# Patient Record
Sex: MALE | Race: BLACK OR AFRICAN AMERICAN | NOT HISPANIC OR LATINO | URBAN - METROPOLITAN AREA
[De-identification: names, ages, dates, MRNs, and addresses within clinical notes are randomized per-mention and may not be internally consistent; named-entity substitution may affect disease eponyms.]

---

## 2018-05-18 ENCOUNTER — INPATIENT (INPATIENT)
Facility: HOSPITAL | Age: 68
LOS: 3 days | Discharge: HOME | End: 2018-05-22
Attending: HOSPITALIST | Admitting: HOSPITALIST
Payer: SELF-PAY

## 2018-05-18 VITALS
RESPIRATION RATE: 16 BRPM | DIASTOLIC BLOOD PRESSURE: 94 MMHG | TEMPERATURE: 98 F | HEART RATE: 86 BPM | OXYGEN SATURATION: 100 % | SYSTOLIC BLOOD PRESSURE: 161 MMHG

## 2018-05-18 NOTE — ED ADULT TRIAGE NOTE - CHIEF COMPLAINT QUOTE
pt c/o "right foot infection"- pts right foot is red & swollen, with blister and drainage between 4th&5th toe- pt on cephalexin since Tuesday from PCP with no improvement- pt denies fevers/chills, PMH HTN, ambulatory in triage

## 2018-05-19 DIAGNOSIS — L08.9 LOCAL INFECTION OF THE SKIN AND SUBCUTANEOUS TISSUE, UNSPECIFIED: ICD-10-CM

## 2018-05-19 LAB
ALBUMIN SERPL ELPH-MCNC: 4 G/DL — SIGNIFICANT CHANGE UP (ref 3.3–5)
ALP SERPL-CCNC: 86 U/L — SIGNIFICANT CHANGE UP (ref 40–120)
ALT FLD-CCNC: 11 U/L — SIGNIFICANT CHANGE UP (ref 4–41)
APTT BLD: 30.8 SEC — SIGNIFICANT CHANGE UP (ref 27.5–37.4)
AST SERPL-CCNC: 24 U/L — SIGNIFICANT CHANGE UP (ref 4–40)
BASE EXCESS BLDV CALC-SCNC: 1.7 MMOL/L — SIGNIFICANT CHANGE UP
BASOPHILS # BLD AUTO: 0.05 K/UL — SIGNIFICANT CHANGE UP (ref 0–0.2)
BASOPHILS NFR BLD AUTO: 0.6 % — SIGNIFICANT CHANGE UP (ref 0–2)
BILIRUB SERPL-MCNC: 0.5 MG/DL — SIGNIFICANT CHANGE UP (ref 0.2–1.2)
BLD GP AB SCN SERPL QL: NEGATIVE — SIGNIFICANT CHANGE UP
BLOOD GAS VENOUS - CREATININE: 1.52 MG/DL — HIGH (ref 0.5–1.3)
BUN SERPL-MCNC: 11 MG/DL — SIGNIFICANT CHANGE UP (ref 7–23)
BUN SERPL-MCNC: 12 MG/DL — SIGNIFICANT CHANGE UP (ref 7–23)
CALCIUM SERPL-MCNC: 8.9 MG/DL — SIGNIFICANT CHANGE UP (ref 8.4–10.5)
CALCIUM SERPL-MCNC: 9.1 MG/DL — SIGNIFICANT CHANGE UP (ref 8.4–10.5)
CHLORIDE BLDV-SCNC: 107 MMOL/L — SIGNIFICANT CHANGE UP (ref 96–108)
CHLORIDE SERPL-SCNC: 102 MMOL/L — SIGNIFICANT CHANGE UP (ref 98–107)
CHLORIDE SERPL-SCNC: 99 MMOL/L — SIGNIFICANT CHANGE UP (ref 98–107)
CO2 SERPL-SCNC: 23 MMOL/L — SIGNIFICANT CHANGE UP (ref 22–31)
CO2 SERPL-SCNC: 27 MMOL/L — SIGNIFICANT CHANGE UP (ref 22–31)
CREAT SERPL-MCNC: 1.61 MG/DL — HIGH (ref 0.5–1.3)
CREAT SERPL-MCNC: 1.64 MG/DL — HIGH (ref 0.5–1.3)
CRP SERPL-MCNC: 25.4 MG/L — HIGH
EOSINOPHIL # BLD AUTO: 0.13 K/UL — SIGNIFICANT CHANGE UP (ref 0–0.5)
EOSINOPHIL NFR BLD AUTO: 1.6 % — SIGNIFICANT CHANGE UP (ref 0–6)
ERYTHROCYTE [SEDIMENTATION RATE] IN BLOOD: 14 MM/HR — SIGNIFICANT CHANGE UP (ref 1–15)
GAS PNL BLDV: 133 MMOL/L — LOW (ref 136–146)
GLUCOSE BLDV-MCNC: 105 — HIGH (ref 70–99)
GLUCOSE SERPL-MCNC: 101 MG/DL — HIGH (ref 70–99)
GLUCOSE SERPL-MCNC: 105 MG/DL — HIGH (ref 70–99)
GRAM STN SPEC: SIGNIFICANT CHANGE UP
HBA1C BLD-MCNC: 5.8 % — HIGH (ref 4–5.6)
HCO3 BLDV-SCNC: 23 MMOL/L — SIGNIFICANT CHANGE UP (ref 20–27)
HCT VFR BLD CALC: 42.3 % — SIGNIFICANT CHANGE UP (ref 39–50)
HCT VFR BLDV CALC: 44.4 % — SIGNIFICANT CHANGE UP (ref 39–51)
HGB BLD-MCNC: 13.9 G/DL — SIGNIFICANT CHANGE UP (ref 13–17)
HGB BLDV-MCNC: 14.5 G/DL — SIGNIFICANT CHANGE UP (ref 13–17)
IMM GRANULOCYTES # BLD AUTO: 0.02 # — SIGNIFICANT CHANGE UP
IMM GRANULOCYTES NFR BLD AUTO: 0.3 % — SIGNIFICANT CHANGE UP (ref 0–1.5)
INR BLD: 0.99 — SIGNIFICANT CHANGE UP (ref 0.88–1.17)
LACTATE BLDV-MCNC: 1.6 MMOL/L — SIGNIFICANT CHANGE UP (ref 0.5–2)
LYMPHOCYTES # BLD AUTO: 2.32 K/UL — SIGNIFICANT CHANGE UP (ref 1–3.3)
LYMPHOCYTES # BLD AUTO: 29.2 % — SIGNIFICANT CHANGE UP (ref 13–44)
MCHC RBC-ENTMCNC: 29 PG — SIGNIFICANT CHANGE UP (ref 27–34)
MCHC RBC-ENTMCNC: 32.9 % — SIGNIFICANT CHANGE UP (ref 32–36)
MCV RBC AUTO: 88.1 FL — SIGNIFICANT CHANGE UP (ref 80–100)
MONOCYTES # BLD AUTO: 0.66 K/UL — SIGNIFICANT CHANGE UP (ref 0–0.9)
MONOCYTES NFR BLD AUTO: 8.3 % — SIGNIFICANT CHANGE UP (ref 2–14)
NEUTROPHILS # BLD AUTO: 4.76 K/UL — SIGNIFICANT CHANGE UP (ref 1.8–7.4)
NEUTROPHILS NFR BLD AUTO: 60 % — SIGNIFICANT CHANGE UP (ref 43–77)
NRBC # FLD: 0 — SIGNIFICANT CHANGE UP
PCO2 BLDV: 55 MMHG — HIGH (ref 41–51)
PH BLDV: 7.32 PH — SIGNIFICANT CHANGE UP (ref 7.32–7.43)
PLATELET # BLD AUTO: 258 K/UL — SIGNIFICANT CHANGE UP (ref 150–400)
PMV BLD: 9.4 FL — SIGNIFICANT CHANGE UP (ref 7–13)
PO2 BLDV: < 24 MMHG — LOW (ref 35–40)
POTASSIUM BLDV-SCNC: 4.1 MMOL/L — SIGNIFICANT CHANGE UP (ref 3.4–4.5)
POTASSIUM SERPL-MCNC: 4.3 MMOL/L — SIGNIFICANT CHANGE UP (ref 3.5–5.3)
POTASSIUM SERPL-MCNC: 4.4 MMOL/L — SIGNIFICANT CHANGE UP (ref 3.5–5.3)
POTASSIUM SERPL-SCNC: 4.3 MMOL/L — SIGNIFICANT CHANGE UP (ref 3.5–5.3)
POTASSIUM SERPL-SCNC: 4.4 MMOL/L — SIGNIFICANT CHANGE UP (ref 3.5–5.3)
PROT SERPL-MCNC: 8.5 G/DL — HIGH (ref 6–8.3)
PROTHROM AB SERPL-ACNC: 11 SEC — SIGNIFICANT CHANGE UP (ref 9.8–13.1)
RBC # BLD: 4.8 M/UL — SIGNIFICANT CHANGE UP (ref 4.2–5.8)
RBC # FLD: 12.7 % — SIGNIFICANT CHANGE UP (ref 10.3–14.5)
RH IG SCN BLD-IMP: POSITIVE — SIGNIFICANT CHANGE UP
RH IG SCN BLD-IMP: POSITIVE — SIGNIFICANT CHANGE UP
SAO2 % BLDV: 29.3 % — LOW (ref 60–85)
SODIUM SERPL-SCNC: 136 MMOL/L — SIGNIFICANT CHANGE UP (ref 135–145)
SODIUM SERPL-SCNC: 138 MMOL/L — SIGNIFICANT CHANGE UP (ref 135–145)
SPECIMEN SOURCE: SIGNIFICANT CHANGE UP
WBC # BLD: 7.94 K/UL — SIGNIFICANT CHANGE UP (ref 3.8–10.5)
WBC # FLD AUTO: 7.94 K/UL — SIGNIFICANT CHANGE UP (ref 3.8–10.5)

## 2018-05-19 PROCEDURE — 73630 X-RAY EXAM OF FOOT: CPT | Mod: 26,RT

## 2018-05-19 PROCEDURE — 93010 ELECTROCARDIOGRAM REPORT: CPT | Mod: 76

## 2018-05-19 PROCEDURE — 99223 1ST HOSP IP/OBS HIGH 75: CPT

## 2018-05-19 RX ORDER — ACETAMINOPHEN 500 MG
650 TABLET ORAL EVERY 6 HOURS
Qty: 0 | Refills: 0 | Status: DISCONTINUED | OUTPATIENT
Start: 2018-05-19 | End: 2018-05-22

## 2018-05-19 RX ORDER — METOPROLOL TARTRATE 50 MG
50 TABLET ORAL DAILY
Qty: 0 | Refills: 0 | Status: DISCONTINUED | OUTPATIENT
Start: 2018-05-19 | End: 2018-05-22

## 2018-05-19 RX ORDER — CEFEPIME 1 G/1
1000 INJECTION, POWDER, FOR SOLUTION INTRAMUSCULAR; INTRAVENOUS EVERY 12 HOURS
Qty: 0 | Refills: 0 | Status: DISCONTINUED | OUTPATIENT
Start: 2018-05-19 | End: 2018-05-22

## 2018-05-19 RX ORDER — VANCOMYCIN HCL 1 G
750 VIAL (EA) INTRAVENOUS EVERY 12 HOURS
Qty: 0 | Refills: 0 | Status: DISCONTINUED | OUTPATIENT
Start: 2018-05-19 | End: 2018-05-21

## 2018-05-19 RX ORDER — VANCOMYCIN HCL 1 G
1000 VIAL (EA) INTRAVENOUS ONCE
Qty: 0 | Refills: 0 | Status: COMPLETED | OUTPATIENT
Start: 2018-05-19 | End: 2018-05-19

## 2018-05-19 RX ORDER — SODIUM CHLORIDE 9 MG/ML
1000 INJECTION, SOLUTION INTRAVENOUS ONCE
Qty: 0 | Refills: 0 | Status: COMPLETED | OUTPATIENT
Start: 2018-05-19 | End: 2018-05-19

## 2018-05-19 RX ORDER — PIPERACILLIN AND TAZOBACTAM 4; .5 G/20ML; G/20ML
4.5 INJECTION, POWDER, LYOPHILIZED, FOR SOLUTION INTRAVENOUS EVERY 8 HOURS
Qty: 0 | Refills: 0 | Status: DISCONTINUED | OUTPATIENT
Start: 2018-05-19 | End: 2018-05-19

## 2018-05-19 RX ORDER — HYDRALAZINE HCL 50 MG
10 TABLET ORAL EVERY 8 HOURS
Qty: 0 | Refills: 0 | Status: DISCONTINUED | OUTPATIENT
Start: 2018-05-19 | End: 2018-05-19

## 2018-05-19 RX ORDER — ASPIRIN/CALCIUM CARB/MAGNESIUM 324 MG
81 TABLET ORAL DAILY
Qty: 0 | Refills: 0 | Status: DISCONTINUED | OUTPATIENT
Start: 2018-05-19 | End: 2018-05-22

## 2018-05-19 RX ORDER — ENOXAPARIN SODIUM 100 MG/ML
40 INJECTION SUBCUTANEOUS EVERY 24 HOURS
Qty: 0 | Refills: 0 | Status: DISCONTINUED | OUTPATIENT
Start: 2018-05-19 | End: 2018-05-22

## 2018-05-19 RX ORDER — MUPIROCIN 20 MG/G
1 OINTMENT TOPICAL ONCE
Qty: 0 | Refills: 0 | Status: COMPLETED | OUTPATIENT
Start: 2018-05-19 | End: 2018-05-22

## 2018-05-19 RX ORDER — HYDRALAZINE HCL 50 MG
10 TABLET ORAL EVERY 8 HOURS
Qty: 0 | Refills: 0 | Status: DISCONTINUED | OUTPATIENT
Start: 2018-05-19 | End: 2018-05-22

## 2018-05-19 RX ORDER — VANCOMYCIN HCL 1 G
1000 VIAL (EA) INTRAVENOUS EVERY 12 HOURS
Qty: 0 | Refills: 0 | Status: DISCONTINUED | OUTPATIENT
Start: 2018-05-19 | End: 2018-05-19

## 2018-05-19 RX ORDER — ACETAMINOPHEN 500 MG
975 TABLET ORAL ONCE
Qty: 0 | Refills: 0 | Status: COMPLETED | OUTPATIENT
Start: 2018-05-19 | End: 2018-05-19

## 2018-05-19 RX ORDER — MORPHINE SULFATE 50 MG/1
2 CAPSULE, EXTENDED RELEASE ORAL ONCE
Qty: 0 | Refills: 0 | Status: DISCONTINUED | OUTPATIENT
Start: 2018-05-19 | End: 2018-05-19

## 2018-05-19 RX ORDER — METOPROLOL TARTRATE 50 MG
50 TABLET ORAL DAILY
Qty: 0 | Refills: 0 | Status: DISCONTINUED | OUTPATIENT
Start: 2018-05-19 | End: 2018-05-19

## 2018-05-19 RX ORDER — OXYCODONE HYDROCHLORIDE 5 MG/1
5 TABLET ORAL ONCE
Qty: 0 | Refills: 0 | Status: DISCONTINUED | OUTPATIENT
Start: 2018-05-19 | End: 2018-05-19

## 2018-05-19 RX ORDER — ASPIRIN/CALCIUM CARB/MAGNESIUM 324 MG
81 TABLET ORAL DAILY
Qty: 0 | Refills: 0 | Status: DISCONTINUED | OUTPATIENT
Start: 2018-05-19 | End: 2018-05-19

## 2018-05-19 RX ORDER — SODIUM CHLORIDE 9 MG/ML
750 INJECTION INTRAMUSCULAR; INTRAVENOUS; SUBCUTANEOUS
Qty: 0 | Refills: 0 | Status: DISCONTINUED | OUTPATIENT
Start: 2018-05-19 | End: 2018-05-21

## 2018-05-19 RX ORDER — CEFEPIME 1 G/1
2000 INJECTION, POWDER, FOR SOLUTION INTRAMUSCULAR; INTRAVENOUS ONCE
Qty: 0 | Refills: 0 | Status: COMPLETED | OUTPATIENT
Start: 2018-05-19 | End: 2018-05-19

## 2018-05-19 RX ADMIN — Medication 250 MILLIGRAM(S): at 02:02

## 2018-05-19 RX ADMIN — Medication 975 MILLIGRAM(S): at 01:20

## 2018-05-19 RX ADMIN — MORPHINE SULFATE 2 MILLIGRAM(S): 50 CAPSULE, EXTENDED RELEASE ORAL at 13:25

## 2018-05-19 RX ADMIN — SODIUM CHLORIDE 250 MILLILITER(S): 9 INJECTION INTRAMUSCULAR; INTRAVENOUS; SUBCUTANEOUS at 11:19

## 2018-05-19 RX ADMIN — Medication 250 MILLIGRAM(S): at 16:34

## 2018-05-19 RX ADMIN — Medication 10 MILLIGRAM(S): at 14:20

## 2018-05-19 RX ADMIN — SODIUM CHLORIDE 1000 MILLILITER(S): 9 INJECTION, SOLUTION INTRAVENOUS at 03:38

## 2018-05-19 RX ADMIN — MORPHINE SULFATE 2 MILLIGRAM(S): 50 CAPSULE, EXTENDED RELEASE ORAL at 13:08

## 2018-05-19 RX ADMIN — CEFEPIME 100 MILLIGRAM(S): 1 INJECTION, POWDER, FOR SOLUTION INTRAMUSCULAR; INTRAVENOUS at 01:21

## 2018-05-19 RX ADMIN — Medication 650 MILLIGRAM(S): at 19:00

## 2018-05-19 RX ADMIN — Medication 10 MILLIGRAM(S): at 21:56

## 2018-05-19 RX ADMIN — Medication 650 MILLIGRAM(S): at 18:06

## 2018-05-19 RX ADMIN — OXYCODONE HYDROCHLORIDE 5 MILLIGRAM(S): 5 TABLET ORAL at 23:44

## 2018-05-19 RX ADMIN — Medication 975 MILLIGRAM(S): at 02:22

## 2018-05-19 RX ADMIN — ENOXAPARIN SODIUM 40 MILLIGRAM(S): 100 INJECTION SUBCUTANEOUS at 11:53

## 2018-05-19 RX ADMIN — Medication 81 MILLIGRAM(S): at 11:53

## 2018-05-19 RX ADMIN — Medication 50 MILLIGRAM(S): at 11:53

## 2018-05-19 RX ADMIN — SODIUM CHLORIDE 1000 MILLILITER(S): 9 INJECTION, SOLUTION INTRAVENOUS at 01:21

## 2018-05-19 RX ADMIN — CEFEPIME 100 MILLIGRAM(S): 1 INJECTION, POWDER, FOR SOLUTION INTRAMUSCULAR; INTRAVENOUS at 14:42

## 2018-05-19 NOTE — PROGRESS NOTE ADULT - SUBJECTIVE AND OBJECTIVE BOX
Patient is a 68y old  Male who presents with a chief complaint of R foot swelling and pain (19 May 2018 10:58)       INTERVAL HPI/OVERNIGHT EVENTS:  Patient seen and evaluated at bedside.  Pt is resting comfortable in NAD. Denies N/V/F/C.  Pain controlled.    Allergies    No Known Allergies    Intolerances        Vital Signs Last 24 Hrs  T(C): 36.8 (19 May 2018 14:03), Max: 36.9 (18 May 2018 22:13)  T(F): 98.2 (19 May 2018 14:03), Max: 98.4 (18 May 2018 22:13)  HR: 79 (19 May 2018 14:03) (74 - 86)  BP: 152/105 (19 May 2018 14:03) (140/94 - 178/103)  BP(mean): --  RR: 18 (19 May 2018 14:03) (16 - 18)  SpO2: 100% (19 May 2018 14:03) (100% - 100%)    LABS:                        13.9   7.94  )-----------( 258      ( 19 May 2018 01:00 )             42.3     05-19    136  |  99  |  11  ----------------------------<  105<H>  4.4   |  23  |  1.64<H>    Ca    9.1      19 May 2018 01:00    TPro  8.5<H>  /  Alb  4.0  /  TBili  0.5  /  DBili  x   /  AST  24  /  ALT  11  /  AlkPhos  86  05-19    PT/INR - ( 19 May 2018 01:00 )   PT: 11.0 SEC;   INR: 0.99          PTT - ( 19 May 2018 01:00 )  PTT:30.8 SEC    CAPILLARY BLOOD GLUCOSE          Lower Extremity Physical Exam:  Vascular: DP/PT 2/4, B/L, CFT <3 seconds B/L, Temperature gradient increased to RLE & WNL on LLE.   Neuro: Epicritic sensation intact to the level of foot, B/L.  Skin: erythema to dorsal lateral midfoot w/ diffuse edema dorsum foot, no odor no residual purulent drainage noted, no crepitus noted w/ palpation digits,

## 2018-05-19 NOTE — CONSULT NOTE ADULT - ASSESSMENT
69yo M w/ RF 4th interspace abscess  ·	Pt seen and evaluated  ·	Stab incision performed w/ #15 blade and purulence expressed  ·	Culture taken  ·	Alcohol prep and 18cc 1% lidocaine plain used for block of 4th & 5th rays, pt in severe pain w/ abscess exploration  ·	5cc purulence expressed on exploration I&D  ·	Incision and drainage was performed excisional to and including subcutaneous layer using #15 blade and suture kit  ·	Did not PTB to 5th digit, although did probe to fascial layer  ·	Erosion noted on XR at medial 5th proximal phalanx head, no gas or emergent findings  ·	Labs and vitals stable  ·	MRI ordered to further evaluate possible erosion 2/2 osteomyelitis  ·	Recommend Cefepime & Vanco  ·	Recommend admission to medicine for IV abx and MRI workup  ·	d/w attending 67yo M w/ RF 4th interspace abscess  ·	Pt seen and evaluated  ·	Stab incision performed w/ #15 blade and purulence expressed  ·	Culture taken  ·	Alcohol prep and 18cc 1% lidocaine plain used for block of 4th & 5th rays, pt in severe pain w/ abscess exploration  ·	5cc purulence expressed on exploration I&D  ·	Incision and drainage was performed excisional to and including subcutaneous layer using #15 blade and suture kit  ·	Did not PTB to 5th digit, although did probe to fascial layer  ·	Erosion noted on XR at medial 5th proximal phalanx head, no gas or emergent findings  ·	Labs and vitals stable  ·	MRI ordered to further evaluate possible erosion 2/2 osteomyelitis  ·	Recommend Cefepime & Vanco  ·	Recommend admission to medicine for IV abx and MRI workup  ·	Bacitracin dsd applied  ·	d/w attending

## 2018-05-19 NOTE — CONSULT NOTE ADULT - SUBJECTIVE AND OBJECTIVE BOX
Patient is a 68y old  Male who presents with a chief complaint of     HPI: 69 yo male with PMH of HTN and unspecified cardiac dysrhythmia that he is being considered for pacemaker for, presents to the ED for one week of worsening right foot pain and infection. Patient states he believes he cut his foot between his 4th and 5th toes one week ago, saw an outside podiatrist 5 days ago on Tuesday who performed xrays and prescribed Keflex BID but pt states symptoms have been worsening and noted since yesterday (Friday) his entire right foot is swollen and there is a bullae on the dorsal aspect of his right foot between his 4th and 5th toe that seems to be filled with pus. Pt denies h/o diabetes, steroid use, or immunocompromise. Denies fever, chills, n/v, diaphoresis, CP, palpitations, SOB, rash, bruising.      PAST MEDICAL & SURGICAL HISTORY:  HTN (hypertension)  No significant past surgical history      MEDICATIONS  (STANDING):    MEDICATIONS  (PRN):      Allergies    No Known Allergies    Intolerances        VITALS:    Vital Signs Last 24 Hrs  T(C): 36.9 (18 May 2018 22:13), Max: 36.9 (18 May 2018 22:13)  T(F): 98.4 (18 May 2018 22:13), Max: 98.4 (18 May 2018 22:13)  HR: 78 (19 May 2018 01:28) (78 - 86)  BP: 161/120 (19 May 2018 01:28) (161/94 - 161/120)  BP(mean): --  RR: 18 (19 May 2018 01:28) (16 - 18)  SpO2: 100% (19 May 2018 01:28) (100% - 100%)    LABS:                          13.9   7.94  )-----------( 258      ( 19 May 2018 01:00 )             42.3       05-19    136  |  99  |  11  ----------------------------<  105<H>  4.4   |  23  |  1.64<H>    Ca    9.1      19 May 2018 01:00    TPro  8.5<H>  /  Alb  4.0  /  TBili  0.5  /  DBili  x   /  AST  24  /  ALT  11  /  AlkPhos  86  05-19      CAPILLARY BLOOD GLUCOSE          PT/INR - ( 19 May 2018 01:00 )   PT: 11.0 SEC;   INR: 0.99          PTT - ( 19 May 2018 01:00 )  PTT:30.8 SEC    LOWER EXTREMITY PHYSICAL EXAM:    Vascular: DP/PT 2/4, B/L, CFT <3 seconds B/L, Temperature gradient increased to RLE & WNL on LLE.   Neuro: Epicritic sensation intact to the level of foot, B/L.  Skin: erythema to dorsal lateral midfoot w/ diffuse edema dorsum foot, malodor on stab incision of abscess, interdigital abscess 4th interspace, no crepitus noted w/ palpation digits, purulence expressed (none from digits all from interspace)      RADIOLOGY & ADDITIONAL STUDIES:    < from: Xray Foot AP + Lateral + Oblique, Right (05.19.18 @ 01:19) >    ******PRELIMINARY REPORT******    ******PRELIMINARY REPORT******            EXAM:  RAD FOOT MIN 3 VIEWS RIGHT        PROCEDURE DATE:  May 19 2018     ******PRELIMINARY REPORT******    ******PRELIMINARY REPORT******            INTERPRETATION:  No gas.            ******PRELIMINARY REPORT******    ******PRELIMINARY REPORT******          DEION PALACIOS M.D., RADIOLOGY RESIDENT                        < end of copied text >

## 2018-05-19 NOTE — PROGRESS NOTE ADULT - ASSESSMENT
69yo M w/ RF 4th interspace abscess  ·	Pt seen and evaluated  ·	13 cc of 1% lidocaine plaine administered in a ray block  ·	All nonviable tissue debrided and wound explored using a  #15 blade, difficult to fully assess due to visualization, no drainage expressed today  ·	Did not PTB to 5th digit, although did probe to fascial layer  ·	Erosion noted on XR at medial 5th proximal phalanx head, no gas or emergent findings  ·	Labs and vitals stable  ·	Will f/u MRI further evaluate for abscess and possible osteomyelitis  ·	Cont  IV abx  ·	Dilute betadine dsd applied  ·	Mupirocin ordered for future dressing changes  ·	Seen with attending

## 2018-05-19 NOTE — ED PROVIDER NOTE - ATTENDING CONTRIBUTION TO CARE
69 y/o M with h/o HTN BIB daughter for infection to right foot.  Pt reports "a cut" to his right foot, between the 4th and 5th toes about 1 week ago.  Since then he developed swelling and a little "pustule" according to daughter.  Pt was seen by a private podiatrist on Tuesday and started on keflex.  Pt has been compliant with meds, but has had worsening in swelling and redness spreading up the foot laterally toward the ankle.  No fever, chills, n/v, other rashes.  Well appearing, lying comfortably in stretcher, awake and alert, nontoxic.  VSS.  Lungs cta bl.  Cards nl S1/S2, RRR, no MRG.  Abd soft ntnd.  No pedal edema or calf tenderness.  (+)pustule between 4th and 5th right toes at dorsal surface, fluctuant with diffuse surrounding redness, warmth and edema.  Cap refill normal, DP pulse intact.  Pt with cellulitis, failed outpatient rx, will start iv abx, labs, xr to r/o underlying gas or bony changes/fx, podiatry consult.

## 2018-05-19 NOTE — ED PROVIDER NOTE - PHYSICAL EXAMINATION
Ortho: right  to palpation throughout, most prominent on the dorsal and lateral aspects of the right foot, there is a bullae over the dorsal aspect of the right foot between the 4th and 5th toe with purulence between the 4th and 5th toes.

## 2018-05-19 NOTE — ED PROVIDER NOTE - OBJECTIVE STATEMENT
69 yo male with PMH of HTN and unspecified cardiac dysrhythmia that he is being considered for pacemaker for, presents to the ED for one week of worsening right foot pain and infection. Patient states he believes he cut his foot between his 4th and 5th toes one week ago, saw an outside podiatrist 5 days ago on Tuesday who performed xrays and prescribed Keflex BID but pt states symptoms have been worsening and noted since yesterday (Friday) his entire right foot is swollen and there is a bullae on the dorsal aspect of his right foot between his 4th and 5th toe that seems to be filled with pus. Pt denies h/o diabetes, steroid use, or immunocompromise. Denies fever, chills, n/v, diaphoresis, CP, palpitations, SOB, rash, bruising.

## 2018-05-19 NOTE — H&P ADULT - ASSESSMENT
Patient is a 67 y/o man with hypertension, bradycardia who presents with R foot pain x 1 week.    *R foot swelling, pain: abscess drained by podiatry. Likely has surrounding soft tissue infection. R foot x-ray with possible R 5th toe distal phalanx osteomyelitis  - MRI foot noncontrast as recommended by podiatry  - need for operative management as per podiatry  - broad spectrum abx - cefepime, vancomycin, renally adjusted dosing  - f/u abscess culture  - tylenol PRN pain  - bowel regimen    *HTN: BP elevated in ED and on floor  - at home on lisinopril 5 mg, Lasix 20 mg, and Toprol XL 50 mg  - resume Toprol XL  - Cr 1.64 - patient denies knowledge of chronic kidney disease  - home lisinopril, Lasix until baseline renal function verified  - patient had been prescribed hydralazine in December of 2017 per pharmacy - [phone]  - will start hydralazine 10 mg q8h for BP control while verifying baseline kidney function  - monitor BP closely    *Elevated Cr: possible LIZBETH, possible stage 3 CKD  - attempt to obtain baseline Cr from PCP  - appears dry on exam, give  CC over 3 hours  - repeat Cr this evening  - renally dose all medications    *H/o bradycardia: HR currently normal. Per patient he had HR in 30's in the past, refused PPM as "I don't want surgery at my age"  - given elevated BP and now normal HR, resume home Toprol XL 50 mg daily  - EKG  - monitor HR closely    *FEN/GI: low Na diet    *Ppx: SC lovenox    *Dispo: pending w/u as above and medical stability  - OOBTC Patient is a 69 y/o man with hypertension, bradycardia who presents with R foot pain x 1 week.    *R foot swelling, pain: abscess drained by podiatry. Likely has surrounding soft tissue infection. R foot x-ray with possible R 5th toe distal phalanx osteomyelitis  - MRI foot noncontrast as recommended by podiatry  - need for operative management as per podiatry  - broad spectrum abx - cefepime, vancomycin, renally adjusted dosing  - f/u abscess culture  - tylenol PRN pain  - bowel regimen    *HTN: BP elevated in ED and on floor  - at home on lisinopril 5 mg, Lasix 20 mg, and Toprol XL 50 mg  - resume Toprol XL  - Cr 1.64 - patient denies knowledge of chronic kidney disease  - home lisinopril, Lasix until baseline renal function verified  - patient had been prescribed hydralazine in December of 2017 per pharmacy - [phone]  - will start hydralazine 10 mg q8h for BP control while verifying baseline kidney function  - monitor BP closely    *Elevated Cr: possible LIZBETH, possible stage 3 CKD  - attempt to obtain baseline Cr from PCP  - appears dry on exam, give  CC over 3 hours  - repeat Cr this evening  - renally dose all medications    *H/o bradycardia: HR currently normal. Per patient he had HR in 30's in the past, refused PPM as "I don't want surgery at my age"  - given elevated BP and now normal HR, resume home Toprol XL 50 mg daily  - EKG  - monitor HR closely    *Pre-diabetes: patient aware blood glucose elevated, recommend lifestyle change    *FEN/GI: low Na diet    *Ppx: SC lovenox    *Dispo: pending w/u as above and medical stability  - OOBTC

## 2018-05-19 NOTE — ED ADULT NURSE NOTE - OBJECTIVE STATEMENT
Patient A&Ox4 ambulatory coming to ED for R foot infection. No fevers/ chills. R foot is swollen, hot, and red with 3x3cm pus filled sac below 4th and 5th toe. +pulses to bilateral feet/ ankles. Patient denies trauma to the area. IV placed, no distress at this time.

## 2018-05-19 NOTE — H&P ADULT - HISTORY OF PRESENT ILLNESS
HPI:    Patient is a 67 y/o man with hypertension, bradycardia who presents with R foot pain x 1 week. Per patient, about 1 year ago, he had presented to an outside hospital with dyspnea and bradycardia. A pacemaker was recommended at that time, but he did not want surgical intervention. Since per patient he has been doing well, denies chest pain, SOB.    About one week ago he developed a cut in his R foot - between 4th and 5th toes. Patient does not know how it occurred. Since then, his foot has been swollen and in pain. 3 days PTA he was evaluated by an outside podiatrist, who ordered x-ray and prescribed Keflex. Patient did not improve, and then developed a purulent bulla on his R foot. Decided to come to ED. Beyond his foot, patient reports feeling well overall. Denies F/C. Denies N/V, abdominal pain. Reports that he takes his medications - verified with pharmacy at [phone]. Denies known history of heart disease outside of bradycardia, and denies h/o known kidney disease.      PAST MEDICAL & SURGICAL HISTORY:  HTN (hypertension)  No significant past surgical history      Review of Systems:   CONSTITUTIONAL: No fever, weight loss, or fatigue  EYES: No eye pain, visual disturbances, or discharge  NECK: No pain or stiffness  RESPIRATORY: No cough, wheezing, chills or hemoptysis; No shortness of breath  CARDIOVASCULAR: No chest pain, palpitations, dizziness, or leg swelling  GASTROINTESTINAL: No abdominal or epigastric pain. No nausea, vomiting, or hematemesis; No diarrhea or constipation. No melena or hematochezia.  GENITOURINARY: No dysuria, frequency, hematuria, or incontinence  NEUROLOGICAL: No headaches, memory loss, loss of strength, numbness, or tremors  SKIN: No itching, burning, rashes, or lesions   LYMPH NODES: No enlarged glands  MUSCULOSKELETAL: R foot swelling and pain  HEME/LYMPH: No easy bruising, or bleeding gums    Allergies    No Known Allergies    Intolerances        Social History:  former smoker, drinks "root liquor" a few times a month    FAMILY HISTORY:  No pertinent family history in first degree relatives      MEDICATIONS  (STANDING):  aspirin  chewable 81 milliGRAM(s) Oral daily  cefepime   IVPB 1000 milliGRAM(s) IV Intermittent every 12 hours  enoxaparin Injectable 40 milliGRAM(s) SubCutaneous every 24 hours  hydrALAZINE 10 milliGRAM(s) Oral every 8 hours  metoprolol succinate ER 50 milliGRAM(s) Oral daily  mupirocin 2% Ointment 1 Application(s) Topical once  sodium chloride 0.9%. 750 milliLiter(s) (250 mL/Hr) IV Continuous <Continuous>  vancomycin  IVPB 1000 milliGRAM(s) IV Intermittent every 12 hours    MEDICATIONS  (PRN):      T(C): 36.3 (05-19-18 @ 05:00), Max: 36.9 (05-18-18 @ 22:13)  HR: 74 (05-19-18 @ 05:00) (74 - 86)  BP: 178/103 (05-19-18 @ 05:00) (161/94 - 178/103)  RR: 18 (05-19-18 @ 05:00) (16 - 18)  SpO2: 100% (05-19-18 @ 05:00) (100% - 100%)    CAPILLARY BLOOD GLUCOSE        I&O's Summary      PHYSICAL EXAM:  GENERAL: middle-aged man sitting up in bed, comfortable  HEAD:  NCAT  EYES: EOMI, PERRLA  NECK: Supple, No JVD  CHEST/LUNG: CTAB  HEART: nl S1/S2, no M/R/G  ABDOMEN: nondistended, soft, nontender  EXTREMITIES:  R foot in dressings  SKIN: No rashes or lesions  NEUROLOGY: A&Ox3, no focal deficits      LABS:                        13.9   7.94  )-----------( 258      ( 19 May 2018 01:00 )             42.3     05-19    136  |  99  |  11  ----------------------------<  105<H>  4.4   |  23  |  1.64<H>    Ca    9.1      19 May 2018 01:00    TPro  8.5<H>  /  Alb  4.0  /  TBili  0.5  /  DBili  x   /  AST  24  /  ALT  11  /  AlkPhos  86  05-19    PT/INR - ( 19 May 2018 01:00 )   PT: 11.0 SEC;   INR: 0.99          PTT - ( 19 May 2018 01:00 )  PTT:30.8 SEC          RADIOLOGY & ADDITIONAL TESTS:    EKG:   Other radiology  < from: Xray Foot AP + Lateral + Oblique, Right (05.19.18 @ 01:19) >    IMPRESSION:  Possible osteomyelitis distal proximal phalanx fifth toe.          < end of copied text >      Care Discussed with Consultants/Other Providers:

## 2018-05-20 LAB
BASOPHILS # BLD AUTO: 0.05 K/UL — SIGNIFICANT CHANGE UP (ref 0–0.2)
BASOPHILS NFR BLD AUTO: 0.9 % — SIGNIFICANT CHANGE UP (ref 0–2)
BUN SERPL-MCNC: 15 MG/DL — SIGNIFICANT CHANGE UP (ref 7–23)
CALCIUM SERPL-MCNC: 8.7 MG/DL — SIGNIFICANT CHANGE UP (ref 8.4–10.5)
CHLORIDE SERPL-SCNC: 100 MMOL/L — SIGNIFICANT CHANGE UP (ref 98–107)
CK MB BLD-MCNC: 3.52 NG/ML — SIGNIFICANT CHANGE UP (ref 1–6.6)
CK SERPL-CCNC: 135 U/L — SIGNIFICANT CHANGE UP (ref 30–200)
CO2 SERPL-SCNC: 22 MMOL/L — SIGNIFICANT CHANGE UP (ref 22–31)
CREAT SERPL-MCNC: 1.68 MG/DL — HIGH (ref 0.5–1.3)
EOSINOPHIL # BLD AUTO: 0.14 K/UL — SIGNIFICANT CHANGE UP (ref 0–0.5)
EOSINOPHIL NFR BLD AUTO: 2.6 % — SIGNIFICANT CHANGE UP (ref 0–6)
GLUCOSE SERPL-MCNC: 82 MG/DL — SIGNIFICANT CHANGE UP (ref 70–99)
HCT VFR BLD CALC: 36.2 % — LOW (ref 39–50)
HGB BLD-MCNC: 12.3 G/DL — LOW (ref 13–17)
IMM GRANULOCYTES # BLD AUTO: 0.01 # — SIGNIFICANT CHANGE UP
IMM GRANULOCYTES NFR BLD AUTO: 0.2 % — SIGNIFICANT CHANGE UP (ref 0–1.5)
LYMPHOCYTES # BLD AUTO: 1.99 K/UL — SIGNIFICANT CHANGE UP (ref 1–3.3)
LYMPHOCYTES # BLD AUTO: 37.1 % — SIGNIFICANT CHANGE UP (ref 13–44)
MAGNESIUM SERPL-MCNC: 2 MG/DL — SIGNIFICANT CHANGE UP (ref 1.6–2.6)
MCHC RBC-ENTMCNC: 29.5 PG — SIGNIFICANT CHANGE UP (ref 27–34)
MCHC RBC-ENTMCNC: 34 % — SIGNIFICANT CHANGE UP (ref 32–36)
MCV RBC AUTO: 86.8 FL — SIGNIFICANT CHANGE UP (ref 80–100)
MONOCYTES # BLD AUTO: 0.54 K/UL — SIGNIFICANT CHANGE UP (ref 0–0.9)
MONOCYTES NFR BLD AUTO: 10.1 % — SIGNIFICANT CHANGE UP (ref 2–14)
NEUTROPHILS # BLD AUTO: 2.64 K/UL — SIGNIFICANT CHANGE UP (ref 1.8–7.4)
NEUTROPHILS NFR BLD AUTO: 49.1 % — SIGNIFICANT CHANGE UP (ref 43–77)
NRBC # FLD: 0 — SIGNIFICANT CHANGE UP
PHOSPHATE SERPL-MCNC: 3.2 MG/DL — SIGNIFICANT CHANGE UP (ref 2.5–4.5)
PLATELET # BLD AUTO: 227 K/UL — SIGNIFICANT CHANGE UP (ref 150–400)
PMV BLD: 9 FL — SIGNIFICANT CHANGE UP (ref 7–13)
POTASSIUM SERPL-MCNC: 4.3 MMOL/L — SIGNIFICANT CHANGE UP (ref 3.5–5.3)
POTASSIUM SERPL-SCNC: 4.3 MMOL/L — SIGNIFICANT CHANGE UP (ref 3.5–5.3)
RBC # BLD: 4.17 M/UL — LOW (ref 4.2–5.8)
RBC # FLD: 12.7 % — SIGNIFICANT CHANGE UP (ref 10.3–14.5)
SODIUM SERPL-SCNC: 134 MMOL/L — LOW (ref 135–145)
SPECIMEN SOURCE: SIGNIFICANT CHANGE UP
TROPONIN T SERPL-MCNC: < 0.06 NG/ML — SIGNIFICANT CHANGE UP (ref 0–0.06)
WBC # BLD: 5.37 K/UL — SIGNIFICANT CHANGE UP (ref 3.8–10.5)
WBC # FLD AUTO: 5.37 K/UL — SIGNIFICANT CHANGE UP (ref 3.8–10.5)

## 2018-05-20 PROCEDURE — 99233 SBSQ HOSP IP/OBS HIGH 50: CPT

## 2018-05-20 RX ORDER — MORPHINE SULFATE 50 MG/1
2 CAPSULE, EXTENDED RELEASE ORAL ONCE
Qty: 0 | Refills: 0 | Status: DISCONTINUED | OUTPATIENT
Start: 2018-05-20 | End: 2018-05-20

## 2018-05-20 RX ADMIN — Medication 650 MILLIGRAM(S): at 23:58

## 2018-05-20 RX ADMIN — Medication 10 MILLIGRAM(S): at 06:54

## 2018-05-20 RX ADMIN — CEFEPIME 100 MILLIGRAM(S): 1 INJECTION, POWDER, FOR SOLUTION INTRAMUSCULAR; INTRAVENOUS at 01:29

## 2018-05-20 RX ADMIN — Medication 50 MILLIGRAM(S): at 06:54

## 2018-05-20 RX ADMIN — CEFEPIME 100 MILLIGRAM(S): 1 INJECTION, POWDER, FOR SOLUTION INTRAMUSCULAR; INTRAVENOUS at 13:18

## 2018-05-20 RX ADMIN — Medication 650 MILLIGRAM(S): at 07:05

## 2018-05-20 RX ADMIN — ENOXAPARIN SODIUM 40 MILLIGRAM(S): 100 INJECTION SUBCUTANEOUS at 11:04

## 2018-05-20 RX ADMIN — MORPHINE SULFATE 2 MILLIGRAM(S): 50 CAPSULE, EXTENDED RELEASE ORAL at 10:29

## 2018-05-20 RX ADMIN — Medication 650 MILLIGRAM(S): at 22:59

## 2018-05-20 RX ADMIN — Medication 81 MILLIGRAM(S): at 11:04

## 2018-05-20 RX ADMIN — Medication 250 MILLIGRAM(S): at 14:59

## 2018-05-20 RX ADMIN — Medication 10 MILLIGRAM(S): at 22:59

## 2018-05-20 RX ADMIN — Medication 250 MILLIGRAM(S): at 02:10

## 2018-05-20 RX ADMIN — Medication 650 MILLIGRAM(S): at 07:45

## 2018-05-20 RX ADMIN — Medication 10 MILLIGRAM(S): at 13:17

## 2018-05-20 RX ADMIN — OXYCODONE HYDROCHLORIDE 5 MILLIGRAM(S): 5 TABLET ORAL at 00:45

## 2018-05-20 RX ADMIN — MORPHINE SULFATE 2 MILLIGRAM(S): 50 CAPSULE, EXTENDED RELEASE ORAL at 10:44

## 2018-05-20 NOTE — PROGRESS NOTE ADULT - ASSESSMENT
69yo M w/ RF 4th interspace abscess  ·	Pt seen and evaluated  ·	Skin lines returning, edema & erythema improved  ·	Pending MRI for final plan, likely no surgery unless OM or further abscess noted  ·	Labs and vitals stable  ·	Cont  IV abx  ·	Bacitracin dsd applied, mupirocin reordered  ·	d/w attending

## 2018-05-20 NOTE — CHART NOTE - NSCHARTNOTEFT_GEN_A_CORE
ADS NIGHT COVERAGE    Notified by RN that pt is having chest pain. Pt seen and examined at bedside. Pt complains of 3/10 pain localized to the left side of chest that started a few hours ago, nonradiating, mostly with movement. Pt denies SOB, cough, abd pain. On cardiac exam S1S2 noted, not tachycardic. VSS.    ICU Vital Signs Last 24 Hrs  T(C): 36.9 (19 May 2018 21:07), Max: 36.9 (19 May 2018 21:07)  T(F): 98.5 (19 May 2018 21:07), Max: 98.5 (19 May 2018 21:07)  HR: 70 (19 May 2018 23:59) (70 - 85)  BP: 143/94 (19 May 2018 23:59) (117/76 - 178/103)  BP(mean): --  ABP: --  ABP(mean): --  RR: 18 (19 May 2018 23:59) (16 - 18)  SpO2: 100% (19 May 2018 23:59) (98% - 100%)    A/P: 69 y/o man with hypertension, bradycardia who presents with R foot pain x 1 week. Now with chest pain.  1) Chest pain  -EKG ordered-no acute changes  -Cardiac enzymes ordered STAT  -Continue to monitor pt  -Oxy IR given 20 mins ago for foot    E. Candido EDWARDS  [id number]

## 2018-05-20 NOTE — PROGRESS NOTE ADULT - SUBJECTIVE AND OBJECTIVE BOX
Patient is a 68y old  Male who presents with a chief complaint of R foot swelling and pain (19 May 2018 10:58)       INTERVAL HPI/OVERNIGHT EVENTS:  Patient seen and evaluated at bedside.  Pt is resting comfortable in NAD. Denies N/V/F/C.  Pain slightly improved today  Allergies    No Known Allergies    Intolerances        Vital Signs Last 24 Hrs  T(C): 36.9 (20 May 2018 05:07), Max: 36.9 (19 May 2018 21:07)  T(F): 98.4 (20 May 2018 05:07), Max: 98.5 (19 May 2018 21:07)  HR: 72 (20 May 2018 05:07) (70 - 85)  BP: 119/76 (20 May 2018 05:07) (109/68 - 152/105)  BP(mean): --  RR: 18 (20 May 2018 05:07) (16 - 18)  SpO2: 100% (20 May 2018 05:07) (98% - 100%)    LABS:                        12.3   5.37  )-----------( 227      ( 20 May 2018 06:35 )             36.2     05-20    134<L>  |  100  |  15  ----------------------------<  82  4.3   |  22  |  1.68<H>    Ca    8.7      20 May 2018 06:35  Phos  3.2     05-20  Mg     2.0     05-20    TPro  8.5<H>  /  Alb  4.0  /  TBili  0.5  /  DBili  x   /  AST  24  /  ALT  11  /  AlkPhos  86  05-19    PT/INR - ( 19 May 2018 01:00 )   PT: 11.0 SEC;   INR: 0.99          PTT - ( 19 May 2018 01:00 )  PTT:30.8 SEC    CAPILLARY BLOOD GLUCOSE          Lower Extremity Physical Exam:  Vascular: DP/PT 2/4, B/L, CFT <3 seconds B/L, Temperature gradient increased to RLE & WNL on LLE.   Neuro: Epicritic sensation intact to the level of foot, B/L.  Skin: erythema to dorsal lateral midfoot w/ diffuse edema dorsum foot, no odor no residual purulent drainage noted, no crepitus noted w/ palpation digits,    RADIOLOGY & ADDITIONAL TESTS:

## 2018-05-20 NOTE — PROGRESS NOTE ADULT - SUBJECTIVE AND OBJECTIVE BOX
Patient is a 68y old  Male who presents with a chief complaint of R foot swelling and pain (19 May 2018 10:58)      SUBJECTIVE / OVERNIGHT EVENTS:    MEDICATIONS  (STANDING):  aspirin  chewable 81 milliGRAM(s) Oral daily  cefepime   IVPB 1000 milliGRAM(s) IV Intermittent every 12 hours  enoxaparin Injectable 40 milliGRAM(s) SubCutaneous every 24 hours  hydrALAZINE 10 milliGRAM(s) Oral every 8 hours  metoprolol succinate ER 50 milliGRAM(s) Oral daily  mupirocin 2% Ointment 1 Application(s) Topical once  sodium chloride 0.9%. 750 milliLiter(s) (250 mL/Hr) IV Continuous <Continuous>  vancomycin  IVPB 750 milliGRAM(s) IV Intermittent every 12 hours    MEDICATIONS  (PRN):  acetaminophen   Tablet. 650 milliGRAM(s) Oral every 6 hours PRN Mild and Moderate pain and headache        PHYSICAL EXAM:  Vital Signs Last 24 Hrs  T(C): 36.9 (20 May 2018 05:07), Max: 36.9 (19 May 2018 21:07)  T(F): 98.4 (20 May 2018 05:07), Max: 98.5 (19 May 2018 21:07)  HR: 72 (20 May 2018 05:07) (70 - 85)  BP: 119/76 (20 May 2018 05:07) (109/68 - 152/105)  BP(mean): --  RR: 18 (20 May 2018 05:07) (16 - 18)  SpO2: 100% (20 May 2018 05:07) (98% - 100%)  GENERAL: NAD, well-developed  HEAD:  Atraumatic, Normocephalic  EYES: EOMI, PERRLA, conjunctiva and sclera clear  NECK: Supple, No JVD  CHEST/LUNG: Clear to auscultation bilaterally; No wheeze  HEART: Regular rate and rhythm; No murmurs, rubs, or gallops  ABDOMEN: Soft, Nontender, Nondistended; Bowel sounds present  EXTREMITIES:  2+ Peripheral Pulses, No clubbing, cyanosis, or edema  PSYCH: AAOx3  NEUROLOGY: non-focal  SKIN: No rashes or lesions    LABS:                        12.3   5.37  )-----------( 227      ( 20 May 2018 06:35 )             36.2     05-20    134<L>  |  100  |  15  ----------------------------<  82  4.3   |  22  |  1.68<H>    Ca    8.7      20 May 2018 06:35  Phos  3.2     05-20  Mg     2.0     05-20    TPro  8.5<H>  /  Alb  4.0  /  TBili  0.5  /  DBili  x   /  AST  24  /  ALT  11  /  AlkPhos  86  05-19    PT/INR - ( 19 May 2018 01:00 )   PT: 11.0 SEC;   INR: 0.99          PTT - ( 19 May 2018 01:00 )  PTT:30.8 SEC  CARDIAC MARKERS ( 20 May 2018 01:15 )  x     / < 0.06 ng/mL / 135 u/L / 3.52 ng/mL / x              RADIOLOGY & ADDITIONAL TESTS:    Imaging Personally Reviewed:    Consultant(s) Notes Reviewed:      Care Discussed with Consultants/Other Providers: Patient is a 68y old  Male who presents with a chief complaint of R foot swelling and pain (19 May 2018 10:58)      SUBJECTIVE / OVERNIGHT EVENTS:  Foot pain now improved with pain meds.  Awaiting MRI    MEDICATIONS  (STANDING):  aspirin  chewable 81 milliGRAM(s) Oral daily  cefepime   IVPB 1000 milliGRAM(s) IV Intermittent every 12 hours  enoxaparin Injectable 40 milliGRAM(s) SubCutaneous every 24 hours  hydrALAZINE 10 milliGRAM(s) Oral every 8 hours  metoprolol succinate ER 50 milliGRAM(s) Oral daily  mupirocin 2% Ointment 1 Application(s) Topical once  sodium chloride 0.9%. 750 milliLiter(s) (250 mL/Hr) IV Continuous <Continuous>  vancomycin  IVPB 750 milliGRAM(s) IV Intermittent every 12 hours    MEDICATIONS  (PRN):  acetaminophen   Tablet. 650 milliGRAM(s) Oral every 6 hours PRN Mild and Moderate pain and headache        PHYSICAL EXAM:  Vital Signs Last 24 Hrs  T(C): 36.9 (20 May 2018 05:07), Max: 36.9 (19 May 2018 21:07)  T(F): 98.4 (20 May 2018 05:07), Max: 98.5 (19 May 2018 21:07)  HR: 72 (20 May 2018 05:07) (70 - 85)  BP: 119/76 (20 May 2018 05:07) (109/68 - 152/105)  BP(mean): --  RR: 18 (20 May 2018 05:07) (16 - 18)  SpO2: 100% (20 May 2018 05:07) (98% - 100%)  GENERAL: NAD, well-developed  HEAD:  Atraumatic, Normocephalic  EYES: EOMI, PERRLA, conjunctiva and sclera clear  NECK: Supple, No JVD  CHEST/LUNG: Clear to auscultation bilaterally; No wheeze  HEART: Regular rate and rhythm; No murmurs, rubs, or gallops  ABDOMEN: Soft, Nontender, Nondistended; Bowel sounds present  EXTREMITIES:  2+ Peripheral Pulses, No clubbing, cyanosis, or edema  R foot with Dressing in Place  PSYCH: AAOx3  NEUROLOGY: non-focal  SKIN: No rashes or lesions    LABS:                        12.3   5.37  )-----------( 227      ( 20 May 2018 06:35 )             36.2     05-20    134<L>  |  100  |  15  ----------------------------<  82  4.3   |  22  |  1.68<H>    Ca    8.7      20 May 2018 06:35  Phos  3.2     05-20  Mg     2.0     05-20    TPro  8.5<H>  /  Alb  4.0  /  TBili  0.5  /  DBili  x   /  AST  24  /  ALT  11  /  AlkPhos  86  05-19    PT/INR - ( 19 May 2018 01:00 )   PT: 11.0 SEC;   INR: 0.99          PTT - ( 19 May 2018 01:00 )  PTT:30.8 SEC  CARDIAC MARKERS ( 20 May 2018 01:15 )  x     / < 0.06 ng/mL / 135 u/L / 3.52 ng/mL / x              RADIOLOGY & ADDITIONAL TESTS:    Imaging Personally Reviewed:    Consultant(s) Notes Reviewed:      Care Discussed with Consultants/Other Providers:  Pod

## 2018-05-21 LAB
-  AMIKACIN: SIGNIFICANT CHANGE UP
-  AMIKACIN: SIGNIFICANT CHANGE UP
-  AMPICILLIN/SULBACTAM: SIGNIFICANT CHANGE UP
-  AMPICILLIN/SULBACTAM: SIGNIFICANT CHANGE UP
-  AMPICILLIN: SIGNIFICANT CHANGE UP
-  AMPICILLIN: SIGNIFICANT CHANGE UP
-  AZTREONAM: SIGNIFICANT CHANGE UP
-  AZTREONAM: SIGNIFICANT CHANGE UP
-  CEFAZOLIN: SIGNIFICANT CHANGE UP
-  CEFAZOLIN: SIGNIFICANT CHANGE UP
-  CEFEPIME: SIGNIFICANT CHANGE UP
-  CEFEPIME: SIGNIFICANT CHANGE UP
-  CEFOXITIN: SIGNIFICANT CHANGE UP
-  CEFOXITIN: SIGNIFICANT CHANGE UP
-  CEFTAZIDIME: SIGNIFICANT CHANGE UP
-  CEFTAZIDIME: SIGNIFICANT CHANGE UP
-  CEFTRIAXONE: SIGNIFICANT CHANGE UP
-  CEFTRIAXONE: SIGNIFICANT CHANGE UP
-  CIPROFLOXACIN: SIGNIFICANT CHANGE UP
-  CIPROFLOXACIN: SIGNIFICANT CHANGE UP
-  ERTAPENEM: SIGNIFICANT CHANGE UP
-  ERTAPENEM: SIGNIFICANT CHANGE UP
-  GENTAMICIN: SIGNIFICANT CHANGE UP
-  GENTAMICIN: SIGNIFICANT CHANGE UP
-  LEVOFLOXACIN: SIGNIFICANT CHANGE UP
-  LEVOFLOXACIN: SIGNIFICANT CHANGE UP
-  MEROPENEM: SIGNIFICANT CHANGE UP
-  MEROPENEM: SIGNIFICANT CHANGE UP
-  PIPERACILLIN/TAZOBACTAM: SIGNIFICANT CHANGE UP
-  PIPERACILLIN/TAZOBACTAM: SIGNIFICANT CHANGE UP
-  TOBRAMYCIN: SIGNIFICANT CHANGE UP
-  TOBRAMYCIN: SIGNIFICANT CHANGE UP
-  TRIMETHOPRIM/SULFAMETHOXAZOLE: SIGNIFICANT CHANGE UP
-  TRIMETHOPRIM/SULFAMETHOXAZOLE: SIGNIFICANT CHANGE UP
BACTERIA WND CULT: SIGNIFICANT CHANGE UP
BASOPHILS # BLD AUTO: 0.05 K/UL — SIGNIFICANT CHANGE UP (ref 0–0.2)
BASOPHILS NFR BLD AUTO: 1.1 % — SIGNIFICANT CHANGE UP (ref 0–2)
BUN SERPL-MCNC: 18 MG/DL — SIGNIFICANT CHANGE UP (ref 7–23)
CALCIUM SERPL-MCNC: 8.8 MG/DL — SIGNIFICANT CHANGE UP (ref 8.4–10.5)
CHLORIDE SERPL-SCNC: 100 MMOL/L — SIGNIFICANT CHANGE UP (ref 98–107)
CO2 SERPL-SCNC: 24 MMOL/L — SIGNIFICANT CHANGE UP (ref 22–31)
CREAT SERPL-MCNC: 1.74 MG/DL — HIGH (ref 0.5–1.3)
CULTURE RESULTS: SIGNIFICANT CHANGE UP
EOSINOPHIL # BLD AUTO: 0.16 K/UL — SIGNIFICANT CHANGE UP (ref 0–0.5)
EOSINOPHIL NFR BLD AUTO: 3.6 % — SIGNIFICANT CHANGE UP (ref 0–6)
GLUCOSE SERPL-MCNC: 82 MG/DL — SIGNIFICANT CHANGE UP (ref 70–99)
GRAM STN SPEC: SIGNIFICANT CHANGE UP
HCT VFR BLD CALC: 38.5 % — LOW (ref 39–50)
HCT VFR BLD CALC: 38.5 % — LOW (ref 39–50)
HGB BLD-MCNC: 12.8 G/DL — LOW (ref 13–17)
HGB BLD-MCNC: 12.8 G/DL — LOW (ref 13–17)
IMM GRANULOCYTES # BLD AUTO: 0.02 # — SIGNIFICANT CHANGE UP
IMM GRANULOCYTES NFR BLD AUTO: 0.5 % — SIGNIFICANT CHANGE UP (ref 0–1.5)
LYMPHOCYTES # BLD AUTO: 1.94 K/UL — SIGNIFICANT CHANGE UP (ref 1–3.3)
LYMPHOCYTES # BLD AUTO: 44.2 % — HIGH (ref 13–44)
MCHC RBC-ENTMCNC: 28.6 PG — SIGNIFICANT CHANGE UP (ref 27–34)
MCHC RBC-ENTMCNC: 28.6 PG — SIGNIFICANT CHANGE UP (ref 27–34)
MCHC RBC-ENTMCNC: 33.2 % — SIGNIFICANT CHANGE UP (ref 32–36)
MCHC RBC-ENTMCNC: 33.2 % — SIGNIFICANT CHANGE UP (ref 32–36)
MCV RBC AUTO: 86.1 FL — SIGNIFICANT CHANGE UP (ref 80–100)
MCV RBC AUTO: 86.1 FL — SIGNIFICANT CHANGE UP (ref 80–100)
METHOD TYPE: SIGNIFICANT CHANGE UP
METHOD TYPE: SIGNIFICANT CHANGE UP
MONOCYTES # BLD AUTO: 0.43 K/UL — SIGNIFICANT CHANGE UP (ref 0–0.9)
MONOCYTES NFR BLD AUTO: 9.8 % — SIGNIFICANT CHANGE UP (ref 2–14)
NEUTROPHILS # BLD AUTO: 1.79 K/UL — LOW (ref 1.8–7.4)
NEUTROPHILS NFR BLD AUTO: 40.8 % — LOW (ref 43–77)
NRBC # FLD: 0.02 — SIGNIFICANT CHANGE UP
NRBC # FLD: 0.02 — SIGNIFICANT CHANGE UP
ORGANISM # SPEC MICROSCOPIC CNT: SIGNIFICANT CHANGE UP
PLATELET # BLD AUTO: 239 K/UL — SIGNIFICANT CHANGE UP (ref 150–400)
PLATELET # BLD AUTO: 239 K/UL — SIGNIFICANT CHANGE UP (ref 150–400)
PMV BLD: 9 FL — SIGNIFICANT CHANGE UP (ref 7–13)
PMV BLD: 9 FL — SIGNIFICANT CHANGE UP (ref 7–13)
POTASSIUM SERPL-MCNC: 4.2 MMOL/L — SIGNIFICANT CHANGE UP (ref 3.5–5.3)
POTASSIUM SERPL-SCNC: 4.2 MMOL/L — SIGNIFICANT CHANGE UP (ref 3.5–5.3)
RBC # BLD: 4.47 M/UL — SIGNIFICANT CHANGE UP (ref 4.2–5.8)
RBC # BLD: 4.47 M/UL — SIGNIFICANT CHANGE UP (ref 4.2–5.8)
RBC # FLD: 12.6 % — SIGNIFICANT CHANGE UP (ref 10.3–14.5)
RBC # FLD: 12.6 % — SIGNIFICANT CHANGE UP (ref 10.3–14.5)
SODIUM SERPL-SCNC: 136 MMOL/L — SIGNIFICANT CHANGE UP (ref 135–145)
VANCOMYCIN TROUGH SERPL-MCNC: 16.1 UG/ML — SIGNIFICANT CHANGE UP (ref 10–20)
VANCOMYCIN TROUGH SERPL-MCNC: 24.1 UG/ML — HIGH (ref 10–20)
WBC # BLD: 4.39 K/UL — SIGNIFICANT CHANGE UP (ref 3.8–10.5)
WBC # BLD: 4.39 K/UL — SIGNIFICANT CHANGE UP (ref 3.8–10.5)
WBC # FLD AUTO: 4.39 K/UL — SIGNIFICANT CHANGE UP (ref 3.8–10.5)
WBC # FLD AUTO: 4.39 K/UL — SIGNIFICANT CHANGE UP (ref 3.8–10.5)

## 2018-05-21 PROCEDURE — 99233 SBSQ HOSP IP/OBS HIGH 50: CPT

## 2018-05-21 PROCEDURE — 73720 MRI LWR EXTREMITY W/O&W/DYE: CPT | Mod: 26,RT

## 2018-05-21 RX ORDER — VANCOMYCIN HCL 1 G
500 VIAL (EA) INTRAVENOUS EVERY 12 HOURS
Qty: 0 | Refills: 0 | Status: DISCONTINUED | OUTPATIENT
Start: 2018-05-21 | End: 2018-05-21

## 2018-05-21 RX ORDER — SODIUM CHLORIDE 9 MG/ML
750 INJECTION INTRAMUSCULAR; INTRAVENOUS; SUBCUTANEOUS
Qty: 0 | Refills: 0 | Status: DISCONTINUED | OUTPATIENT
Start: 2018-05-21 | End: 2018-05-22

## 2018-05-21 RX ORDER — VANCOMYCIN HCL 1 G
750 VIAL (EA) INTRAVENOUS EVERY 12 HOURS
Qty: 0 | Refills: 0 | Status: DISCONTINUED | OUTPATIENT
Start: 2018-05-21 | End: 2018-05-22

## 2018-05-21 RX ADMIN — CEFEPIME 100 MILLIGRAM(S): 1 INJECTION, POWDER, FOR SOLUTION INTRAMUSCULAR; INTRAVENOUS at 01:18

## 2018-05-21 RX ADMIN — Medication 10 MILLIGRAM(S): at 06:00

## 2018-05-21 RX ADMIN — Medication 10 MILLIGRAM(S): at 13:16

## 2018-05-21 RX ADMIN — Medication 10 MILLIGRAM(S): at 21:17

## 2018-05-21 RX ADMIN — Medication 650 MILLIGRAM(S): at 06:45

## 2018-05-21 RX ADMIN — SODIUM CHLORIDE 150 MILLILITER(S): 9 INJECTION INTRAMUSCULAR; INTRAVENOUS; SUBCUTANEOUS at 21:09

## 2018-05-21 RX ADMIN — CEFEPIME 100 MILLIGRAM(S): 1 INJECTION, POWDER, FOR SOLUTION INTRAMUSCULAR; INTRAVENOUS at 13:16

## 2018-05-21 RX ADMIN — Medication 650 MILLIGRAM(S): at 06:00

## 2018-05-21 RX ADMIN — Medication 250 MILLIGRAM(S): at 02:30

## 2018-05-21 RX ADMIN — Medication 50 MILLIGRAM(S): at 06:00

## 2018-05-21 RX ADMIN — Medication 81 MILLIGRAM(S): at 13:16

## 2018-05-21 RX ADMIN — ENOXAPARIN SODIUM 40 MILLIGRAM(S): 100 INJECTION SUBCUTANEOUS at 13:17

## 2018-05-21 RX ADMIN — Medication 250 MILLIGRAM(S): at 19:15

## 2018-05-21 NOTE — PROGRESS NOTE ADULT - ASSESSMENT
67yo M w/ RF 4th interspace abscess s/p bedside I&D  ·	Pt seen and evaluated  ·	Skin lines returning, edema & erythema improved  ·	Pending MRI for final plan, likely no surgery unless OM or further abscess noted  ·	Labs and vitals stable  ·	Cont  IV abx  ·	DSD applied

## 2018-05-21 NOTE — PROGRESS NOTE ADULT - SUBJECTIVE AND OBJECTIVE BOX
Patient is a 68y old  Male who presents with a chief complaint of R foot swelling and pain (19 May 2018 10:58)      SUBJECTIVE / OVERNIGHT EVENTS:    MEDICATIONS  (STANDING):  aspirin  chewable 81 milliGRAM(s) Oral daily  cefepime   IVPB 1000 milliGRAM(s) IV Intermittent every 12 hours  enoxaparin Injectable 40 milliGRAM(s) SubCutaneous every 24 hours  hydrALAZINE 10 milliGRAM(s) Oral every 8 hours  metoprolol succinate ER 50 milliGRAM(s) Oral daily  mupirocin 2% Ointment 1 Application(s) Topical once  sodium chloride 0.9%. 750 milliLiter(s) (250 mL/Hr) IV Continuous <Continuous>  vancomycin  IVPB 750 milliGRAM(s) IV Intermittent every 12 hours    MEDICATIONS  (PRN):  acetaminophen   Tablet. 650 milliGRAM(s) Oral every 6 hours PRN Mild and Moderate pain and headache      PHYSICAL EXAM:  Vital Signs Last 24 Hrs  T(C): 36.9 (21 May 2018 05:54), Max: 37.1 (20 May 2018 21:07)  T(F): 98.4 (21 May 2018 05:54), Max: 98.8 (20 May 2018 21:07)  HR: 64 (21 May 2018 05:54) (64 - 72)  BP: 130/86 (21 May 2018 05:54) (116/80 - 130/86)  BP(mean): --  RR: 17 (21 May 2018 05:54) (17 - 18)  SpO2: 99% (21 May 2018 05:54) (99% - 100%)  GENERAL: NAD, well-developed  HEAD:  Atraumatic, Normocephalic  EYES: EOMI, PERRLA, conjunctiva and sclera clear  NECK: Supple, No JVD  CHEST/LUNG: Clear to auscultation bilaterally; No wheeze  HEART: Regular rate and rhythm; No murmurs, rubs, or gallops  ABDOMEN: Soft, Nontender, Nondistended; Bowel sounds present  EXTREMITIES:  2+ Peripheral Pulses, No clubbing, cyanosis, or edema  PSYCH: AAOx3  NEUROLOGY: non-focal  SKIN: No rashes or lesions    LABS:                        12.8   4.39  )-----------( 239      ( 21 May 2018 05:45 )             38.5     05-21    136  |  100  |  18  ----------------------------<  82  4.2   |  24  |  1.74<H>    Ca    8.8      21 May 2018 05:45  Phos  3.2     05-20  Mg     2.0     05-20        CARDIAC MARKERS ( 20 May 2018 01:15 )  x     / < 0.06 ng/mL / 135 u/L / 3.52 ng/mL / x              RADIOLOGY & ADDITIONAL TESTS:    Imaging Personally Reviewed:    Consultant(s) Notes Reviewed:      Care Discussed with Consultants/Other Providers:

## 2018-05-21 NOTE — PROGRESS NOTE ADULT - SUBJECTIVE AND OBJECTIVE BOX
Patient is a 68y old  Male who presents with a chief complaint of R foot swelling and pain (19 May 2018 10:58)       INTERVAL HPI/OVERNIGHT EVENTS:  Patient seen and evaluated at bedside.  Pt is resting comfortable in NAD. Denies N/V/F/C.  Pain slightly improving but still present.    Allergies    No Known Allergies    Intolerances        Vital Signs Last 24 Hrs  T(C): 36.9 (21 May 2018 05:54), Max: 37.1 (20 May 2018 21:07)  T(F): 98.4 (21 May 2018 05:54), Max: 98.8 (20 May 2018 21:07)  HR: 64 (21 May 2018 05:54) (64 - 72)  BP: 130/86 (21 May 2018 05:54) (116/80 - 130/86)  BP(mean): --  RR: 17 (21 May 2018 05:54) (17 - 18)  SpO2: 99% (21 May 2018 05:54) (99% - 100%)    LABS:                        12.8   4.39  )-----------( 239      ( 21 May 2018 05:45 )             38.5     05-21    136  |  100  |  18  ----------------------------<  82  4.2   |  24  |  1.74<H>    Ca    8.8      21 May 2018 05:45  Phos  3.2     05-20  Mg     2.0     05-20          CAPILLARY BLOOD GLUCOSE          Lower Extremity Physical Exam:  Vascular: DP/PT 2/4, B/L, CFT <3 seconds B/L, Temperature gradient increased to RLE & WNL on LLE.   Neuro: Epicritic sensation intact to the level of foot, B/L.  Skin: mild erythema to dorsal lateral midfoot w/ edema to dorsal foot, improving, no odor no residual purulent drainage noted, no crepitus noted w/ palpation digits,    RADIOLOGY & ADDITIONAL TESTS:

## 2018-05-21 NOTE — PROGRESS NOTE ADULT - ASSESSMENT
Patient is a 69 y/o man with hypertension, bradycardia who presents with R foot pain x 1 week.    *R foot swelling, pain: abscess drained by podiatry. Likely has surrounding soft tissue infection. R foot x-ray with possible R 5th toe distal phalanx osteomyelitis  - MRI foot noncontrast as recommended by podiatry  - need for operative management as per podiatry  - broad spectrum abx - cefepime, vancomycin, renally adjusted dosing  - f/u abscess culture  - tylenol PRN pain  - bowel regimen    *HTN: BP elevated in ED and on floor  - at home on lisinopril 5 mg, Lasix 20 mg, and Toprol XL 50 mg  - resume Toprol XL  - Cr 1.64 - patient denies knowledge of chronic kidney disease  - home lisinopril, Lasix until baseline renal function verified  - patient had been prescribed hydralazine in December of 2017 per pharmacy - [phone]  - will start hydralazine 10 mg q8h for BP control while verifying baseline kidney function  - monitor BP closely    *Elevated Cr: possible LIZBETH, possible stage 3 CKD  - attempt to obtain baseline Cr from PCP  - appears dry on exam, give  CC over 3 hours  - repeat Cr this evening  - renally dose all medications    *H/o bradycardia: HR currently normal. Per patient he had HR in 30's in the past, refused PPM as "I don't want surgery at my age"  - given elevated BP and now normal HR, resume home Toprol XL 50 mg daily  - EKG  - monitor HR closely    *Pre-diabetes: patient aware blood glucose elevated, recommend lifestyle change    *FEN/GI: low Na diet    *Ppx: SC lovenox    *Dispo: pending w/u as above and medical stability  - OOBTC

## 2018-05-22 ENCOUNTER — TRANSCRIPTION ENCOUNTER (OUTPATIENT)
Age: 68
End: 2018-05-22

## 2018-05-22 VITALS
TEMPERATURE: 97 F | RESPIRATION RATE: 18 BRPM | DIASTOLIC BLOOD PRESSURE: 92 MMHG | SYSTOLIC BLOOD PRESSURE: 153 MMHG | HEART RATE: 62 BPM | OXYGEN SATURATION: 99 %

## 2018-05-22 LAB
BASOPHILS # BLD AUTO: 0.04 K/UL — SIGNIFICANT CHANGE UP (ref 0–0.2)
BASOPHILS NFR BLD AUTO: 0.8 % — SIGNIFICANT CHANGE UP (ref 0–2)
BUN SERPL-MCNC: 21 MG/DL — SIGNIFICANT CHANGE UP (ref 7–23)
CALCIUM SERPL-MCNC: 8.5 MG/DL — SIGNIFICANT CHANGE UP (ref 8.4–10.5)
CHLORIDE SERPL-SCNC: 100 MMOL/L — SIGNIFICANT CHANGE UP (ref 98–107)
CK MB BLD-MCNC: 2.91 NG/ML — SIGNIFICANT CHANGE UP (ref 1–6.6)
CK SERPL-CCNC: 128 U/L — SIGNIFICANT CHANGE UP (ref 30–200)
CO2 SERPL-SCNC: 23 MMOL/L — SIGNIFICANT CHANGE UP (ref 22–31)
CREAT SERPL-MCNC: 1.68 MG/DL — HIGH (ref 0.5–1.3)
EOSINOPHIL # BLD AUTO: 0.15 K/UL — SIGNIFICANT CHANGE UP (ref 0–0.5)
EOSINOPHIL NFR BLD AUTO: 3 % — SIGNIFICANT CHANGE UP (ref 0–6)
GLUCOSE SERPL-MCNC: 95 MG/DL — SIGNIFICANT CHANGE UP (ref 70–99)
HBA1C BLD-MCNC: 5.9 % — HIGH (ref 4–5.6)
HCT VFR BLD CALC: 37.2 % — LOW (ref 39–50)
HCT VFR BLD CALC: 37.2 % — LOW (ref 39–50)
HGB BLD-MCNC: 12.6 G/DL — LOW (ref 13–17)
HGB BLD-MCNC: 12.6 G/DL — LOW (ref 13–17)
IMM GRANULOCYTES # BLD AUTO: 0.02 # — SIGNIFICANT CHANGE UP
IMM GRANULOCYTES NFR BLD AUTO: 0.4 % — SIGNIFICANT CHANGE UP (ref 0–1.5)
LYMPHOCYTES # BLD AUTO: 1.88 K/UL — SIGNIFICANT CHANGE UP (ref 1–3.3)
LYMPHOCYTES # BLD AUTO: 37.6 % — SIGNIFICANT CHANGE UP (ref 13–44)
MCHC RBC-ENTMCNC: 29.3 PG — SIGNIFICANT CHANGE UP (ref 27–34)
MCHC RBC-ENTMCNC: 29.3 PG — SIGNIFICANT CHANGE UP (ref 27–34)
MCHC RBC-ENTMCNC: 33.9 % — SIGNIFICANT CHANGE UP (ref 32–36)
MCHC RBC-ENTMCNC: 33.9 % — SIGNIFICANT CHANGE UP (ref 32–36)
MCV RBC AUTO: 86.5 FL — SIGNIFICANT CHANGE UP (ref 80–100)
MCV RBC AUTO: 86.5 FL — SIGNIFICANT CHANGE UP (ref 80–100)
MONOCYTES # BLD AUTO: 0.48 K/UL — SIGNIFICANT CHANGE UP (ref 0–0.9)
MONOCYTES NFR BLD AUTO: 9.6 % — SIGNIFICANT CHANGE UP (ref 2–14)
NEUTROPHILS # BLD AUTO: 2.43 K/UL — SIGNIFICANT CHANGE UP (ref 1.8–7.4)
NEUTROPHILS NFR BLD AUTO: 48.6 % — SIGNIFICANT CHANGE UP (ref 43–77)
NRBC # FLD: 0 — SIGNIFICANT CHANGE UP
NRBC # FLD: 0 — SIGNIFICANT CHANGE UP
PLATELET # BLD AUTO: 243 K/UL — SIGNIFICANT CHANGE UP (ref 150–400)
PLATELET # BLD AUTO: 243 K/UL — SIGNIFICANT CHANGE UP (ref 150–400)
PMV BLD: 9.1 FL — SIGNIFICANT CHANGE UP (ref 7–13)
PMV BLD: 9.1 FL — SIGNIFICANT CHANGE UP (ref 7–13)
POTASSIUM SERPL-MCNC: 4.7 MMOL/L — SIGNIFICANT CHANGE UP (ref 3.5–5.3)
POTASSIUM SERPL-SCNC: 4.7 MMOL/L — SIGNIFICANT CHANGE UP (ref 3.5–5.3)
RBC # BLD: 4.3 M/UL — SIGNIFICANT CHANGE UP (ref 4.2–5.8)
RBC # BLD: 4.3 M/UL — SIGNIFICANT CHANGE UP (ref 4.2–5.8)
RBC # FLD: 12.6 % — SIGNIFICANT CHANGE UP (ref 10.3–14.5)
RBC # FLD: 12.6 % — SIGNIFICANT CHANGE UP (ref 10.3–14.5)
SODIUM SERPL-SCNC: 134 MMOL/L — LOW (ref 135–145)
TROPONIN T SERPL-MCNC: < 0.06 NG/ML — SIGNIFICANT CHANGE UP (ref 0–0.06)
WBC # BLD: 5 K/UL — SIGNIFICANT CHANGE UP (ref 3.8–10.5)
WBC # BLD: 5 K/UL — SIGNIFICANT CHANGE UP (ref 3.8–10.5)
WBC # FLD AUTO: 5 K/UL — SIGNIFICANT CHANGE UP (ref 3.8–10.5)
WBC # FLD AUTO: 5 K/UL — SIGNIFICANT CHANGE UP (ref 3.8–10.5)

## 2018-05-22 PROCEDURE — 99239 HOSP IP/OBS DSCHRG MGMT >30: CPT

## 2018-05-22 PROCEDURE — 93010 ELECTROCARDIOGRAM REPORT: CPT

## 2018-05-22 RX ADMIN — CEFEPIME 100 MILLIGRAM(S): 1 INJECTION, POWDER, FOR SOLUTION INTRAMUSCULAR; INTRAVENOUS at 14:25

## 2018-05-22 RX ADMIN — Medication 10 MILLIGRAM(S): at 06:00

## 2018-05-22 RX ADMIN — Medication 50 MILLIGRAM(S): at 06:00

## 2018-05-22 RX ADMIN — SODIUM CHLORIDE 150 MILLILITER(S): 9 INJECTION INTRAMUSCULAR; INTRAVENOUS; SUBCUTANEOUS at 08:07

## 2018-05-22 RX ADMIN — Medication 81 MILLIGRAM(S): at 11:17

## 2018-05-22 RX ADMIN — MUPIROCIN 1 APPLICATION(S): 20 OINTMENT TOPICAL at 11:17

## 2018-05-22 RX ADMIN — ENOXAPARIN SODIUM 40 MILLIGRAM(S): 100 INJECTION SUBCUTANEOUS at 11:17

## 2018-05-22 RX ADMIN — Medication 10 MILLIGRAM(S): at 14:25

## 2018-05-22 RX ADMIN — CEFEPIME 100 MILLIGRAM(S): 1 INJECTION, POWDER, FOR SOLUTION INTRAMUSCULAR; INTRAVENOUS at 02:20

## 2018-05-22 RX ADMIN — Medication 250 MILLIGRAM(S): at 06:01

## 2018-05-22 RX ADMIN — Medication 650 MILLIGRAM(S): at 03:58

## 2018-05-22 RX ADMIN — Medication 650 MILLIGRAM(S): at 02:58

## 2018-05-22 NOTE — DISCHARGE NOTE ADULT - CARE PLAN
Goal:	wound healing  Assessment and plan of treatment:	- weightbearing as tolerated in surgical shoe  - Take Augmentin 875mg twice daily for 7 days  - Take dressing down daily and soak foot in warm water and epsom salt, after that apply a new dressing with mupirocin ointment on the wound and cover with 4x4 gauze and kerlix.  - follow up with Dr. Russell within 1 week at the wound care center (call  to make appointment) Principal Discharge DX:	Foot infection  Goal:	wound healing  Assessment and plan of treatment:	- weightbearing as tolerated in surgical shoe  - Take Augmentin 875mg twice daily for 7 days  - Take dressing down daily and soak foot in warm water and epsom salt, after that apply a new dressing with mupirocin ointment on the wound and cover with 4x4 gauze and kerlix.  - follow up with Dr. Russell within 1 week at the wound care center (call  to make appointment)- wants patient to be seen on Monday 6/4/18  Secondary Diagnosis:	CKD (chronic kidney disease) stage 3, GFR 30-59 ml/min  Goal:	stable  Assessment and plan of treatment:	c/w Lasix/Enalapril , metoprolol ER and follow up with PCP Dr Megan Knapp at [phone]  Secondary Diagnosis:	Essential hypertension  Assessment and plan of treatment:	c/w home meds

## 2018-05-22 NOTE — PROVIDER CONTACT NOTE (OTHER) - SITUATION
Pt woke up and c/o left-sided chest pain. Rated "8/10." Described "as if someone is grabbing that area."

## 2018-05-22 NOTE — DISCHARGE NOTE ADULT - HOSPITAL COURSE
Patient is a 69 y/o man with hypertension, bradycardia who presents with R foot pain x 1 week.    R foot swelling, pain: abscess drained by podiatry. Likely has surrounding soft tissue infection. R foot x-ray with possible R 5th toe distal phalanx osteomyelitis    Hospital Course  R foot swelling, pain:   abscess drained by podiatry. Likely has surrounding soft tissue infection  . R foot x-ray with possible R 5th toe distal phalanx osteomyelitis  - MRI foot noncontrast:Forefoot cellulitic change. No discrete drainable abscess collections.  Osteomyelitis involving the block 5th distal phalanx and distal portion   of the 5th proximal phalanx.  -As per Podiatry low suspesion for Osteo close follow up with them as out/pt   - broad spectrum abx - cefepime, vancomycin, renally adjusted dosing  - f/u abscess culture-Bert Berg   - tylenol PRN pain  - bowel regimen    *HTN: BP elevated in ED and on floor  - at home on lisinopril 5 mg, Lasix 20 mg, and Toprol XL 50 mg  - Held Toprol XL  - Cr 1.64 -  - Creat in Nov 2017 was 1.7- Patient has CKD and out patient f/u with PCP - resume home meds        *Elevated Cr: possible LIZBETH, possible stage 3 CKD  - attempt to obtain baseline Cr from PCP  - appears dry on exam, give  CC over 3 hours  - repeat Cr this evening  - renally dose all medications    *H/o bradycardia: HR currently normal. Per patient he had HR in 30's in the past, refused PPM as "I don't want surgery at my age"  - given elevated BP and now normal HR, resume home Toprol XL 50 mg daily  - EKG  - monitor HR closely    *Pre-diabetes: patient aware blood glucose elevated, recommend lifestyle change      Dispo: Home Patient is a 67 y/o man with hypertension, bradycardia who presents with R foot pain x 1 week.    R foot swelling, pain: abscess drained by podiatry. Likely has surrounding soft tissue infection. R foot x-ray with possible R 5th toe distal phalanx osteomyelitis    Hospital Course  R foot swelling, pain:   abscess drained by podiatry. Likely has surrounding soft tissue infection  . R foot x-ray with possible R 5th toe distal phalanx osteomyelitis  - MRI foot noncontrast:Forefoot cellulitic change. No discrete drainable abscess collections.  Osteomyelitis involving the block 5th distal phalanx and distal portion   of the 5th proximal phalanx.  -As per Podiatry low suspesion for Osteo close follow up with them as out/pt   - broad spectrum abx - cefepime, vancomycin, renally adjusted dosing  - f/u abscess culture-Bert Berg   - tylenol PRN pain  - bowel regimen    *HTN: BP elevated in ED and on floor  - at home on lisinopril 5 mg, Lasix 20 mg, and Toprol XL 50 mg  - Held Toprol XL  - Cr 1.64 -  - Creat in Nov 2017 was 1.7- Patient has CKD and out patient f/u with PCP - resume home meds  Creat in November 2017 was 1.7 called PCP office and confirmed.        *Elevated Cr: possible LIZBETH, possible stage 3 CKD  - attempt to obtain baseline Cr from PCP  - appears dry on exam, give  CC over 3 hours  - repeat Cr this evening  - renally dose all medications    *H/o bradycardia: HR currently normal. Per patient he had HR in 30's in the past, refused PPM as "I don't want surgery at my age"  - given elevated BP and now normal HR, resume home Toprol XL 50 mg daily  - EKG  - monitor HR closely    *Pre-diabetes: patient aware blood glucose elevated, recommend lifestyle change      Dispo: Home   patient given supplies for wound care. Patient aware he needs to f/u with Podiatry Ochoa Russell  on 6/4/18 as well as PCP

## 2018-05-22 NOTE — CHART NOTE - NSCHARTNOTEFT_GEN_A_CORE
ADS NIGHT COVERAGE    Notified by RN that pt is having chest pain. Pt seen and examined at bedside. Pt complains of 4-6/10 pain localized to the left side of chest that started an hour ago, pain is described as sharp and nonradiating. Pt denies SOB, cough, abd pain. On cardiac exam S1S2 noted, regular rate and rhythm. VSS.     When asked about previous cardiac history pt mentions bradycardia and he was recommended to get a pacemaker but did not want to. Pt states he "hears different voices telling me things in here and I don't listen to them", pt pointing to ears. Pt also referring to people tracking him for years. Pt alert and oriented.     Vital Signs Last 24 Hrs  T(C): 36.9 (22 May 2018 02:31), Max: 36.9 (21 May 2018 05:54)  T(F): 98.5 (22 May 2018 02:31), Max: 98.5 (22 May 2018 02:31)  HR: 66 (22 May 2018 02:31) (55 - 66)  BP: 130/81 (22 May 2018 02:31) (121/74 - 139/92)  BP(mean): --  RR: 17 (22 May 2018 02:31) (17 - 18)  SpO2: 98% (22 May 2018 02:31) (97% - 99%)    A/P: 67 y/o man with hypertension, bradycardia who presents with R foot pain x 1 week. Now with chest pain.  1) Chest pain  -EKG ordered-no acute changes from previous  -Cardiac enzymes ordered STAT  -Continue to monitor pt    Consider psych consult for possible auditory hallucinations.     GEORGETTE Rey PA-C  [id number].

## 2018-05-22 NOTE — DISCHARGE NOTE ADULT - OTHER SIGNIFICANT FINDINGS
< from: MR Foot w/wo IV Cont, Right (05.21.18 @ 19:32) >  IMPRESSION:  Forefoot cellulitic change.     No discrete drainable abscess collections.    Osteomyelitis involving the block 5th distal phalanx and distal portion   of the 5th proximal phalanx.    Hallux valgus deformity with bunion and 1st MTP joint osteoarthritic   change.    < end of copied text >

## 2018-05-22 NOTE — DISCHARGE NOTE ADULT - REASON FOR ADMISSION
R foot swelling and pain R foot swelling and pain- Cellulitis to L foot with Concern for Osteo to L 5th Toe  CKD III

## 2018-05-22 NOTE — DISCHARGE NOTE ADULT - VISION (WITH CORRECTIVE LENSES IF THE PATIENT USUALLY WEARS THEM):
Preventing Falls: Care Instructions  Your Care Instructions  Getting around your home safely can be a challenge if you have injuries or health problems that make it easy for you to fall. Loose rugs and furniture in walkways are among the dangers for many older people who have problems walking or who have poor eyesight. People who have conditions such as arthritis, osteoporosis, or dementia also have to be careful not to fall. You can make your home safer with a few simple measures. Follow-up care is a key part of your treatment and safety. Be sure to make and go to all appointments, and call your doctor if you are having problems. It's also a good idea to know your test results and keep a list of the medicines you take. How can you care for yourself at home? Taking care of yourself  · You may get dizzy if you do not drink enough water. To prevent dehydration, drink plenty of fluids, enough so that your urine is light yellow or clear like water. Choose water and other caffeine-free clear liquids. If you have kidney, heart, or liver disease and have to limit fluids, talk with your doctor before you increase the amount of fluids you drink. · Exercise regularly to improve your strength, muscle tone, and balance. Walk if you can. Swimming may be a good choice if you cannot walk easily. · Have your vision and hearing checked each year or any time you notice a change. If you have trouble seeing and hearing, you might not be able to avoid objects and could lose your balance. · Know the side effects of the medicines you take. Ask your doctor or pharmacist whether the medicines you take can affect your balance. Sleeping pills or sedatives can affect your balance. · Limit the amount of alcohol you drink. Alcohol can impair your balance and other senses. · Ask your doctor whether calluses or corns on your feet need to be removed.  If you wear loose-fitting shoes because of calluses or corns, you can lose your balance and fall. · Talk to your doctor if you have numbness in your feet. Preventing falls at home  · Remove raised doorway thresholds, throw rugs, and clutter. Repair loose carpet or raised areas in the floor. · Move furniture and electrical cords to keep them out of walking paths. · Use nonskid floor wax, and wipe up spills right away, especially on ceramic tile floors. · If you use a walker or cane, put rubber tips on it. If you use crutches, clean the bottoms of them regularly with an abrasive pad, such as steel wool. · Keep your house well lit, especially [employer], and outside walkways. Use night-lights in areas such as hallways and bathrooms. Add extra light switches or use remote switches (such as switches that go on or off when you clap your hands) to make it easier to turn lights on if you have to get up during the night. · Install sturdy handrails on stairways. · Move items in your cabinets so that the things you use a lot are on the lower shelves (about waist level). · Keep a cordless phone and a flashlight with new batteries by your bed. If possible, put a phone in each of the main rooms of your house, or carry a cell phone in case you fall and cannot reach a phone. Or, you can wear a device around your neck or wrist. You push a button that sends a signal for help. · Wear low-heeled shoes that fit well and give your feet good support. Use footwear with nonskid soles. Check the heels and soles of your shoes for wear. Repair or replace worn heels or soles. · Do not wear socks without shoes on wood floors. · Walk on the grass when the sidewalks are slippery. If you live in an area that gets snow and ice in the winter, sprinkle salt on slippery steps and sidewalks. Preventing falls in the bath  · Install grab bars and nonskid mats inside and outside your shower or tub and near the toilet and sinks. · Use shower chairs and bath benches.   · Use a hand-held shower head that will allow you to sit while showering. · Get into a tub or shower by putting the weaker leg in first. Get out of a tub or shower with your strong side first.  · Repair loose toilet seats and consider installing a raised toilet seat to make getting on and off the toilet easier. · Keep your bathroom door unlocked while you are in the shower. Where can you learn more? Go to http://brandan-liang.info/. Enter [phone] [id number] [phone] in the search box to learn more about \"Preventing Falls: Care Instructions. \"  Current as of: August 4, 2016  Content Version: 11.1  © 8390-6480 [employer]. Care instructions adapted under license by [employer] (which disclaims liability or warranty for this information). If you have questions about a medical condition or this instruction, always ask your healthcare professional. [place] [address] any warranty or liability for your use of this information. We hope that we have addressed all of your medical concerns. The examination and treatment you received in the Emergency Department were for an emergent problem and were not intended as complete care. It is important that you follow up with your healthcare provider(s) for ongoing care. If your symptoms worsen or do not improve as expected, and you are unable to reach your usual health care provider(s), you should return to the Emergency Department. Today's healthcare is undergoing tremendous change, and patient satisfaction surveys are one of the many tools to assess the quality of medical care. You may receive a survey from the [employer] organization regarding your experience in the Emergency Department. I hope that your experience has been completely positive, particularly the medical care that I provided. As such, please participate in the survey; anything less than excellent does not meet my expectations or intentions.         [address] [facility] and [employer] Normal vision: sees adequately in most situations; can see medication labels, [employer] participate in nationally recognized quality of care measures. If your blood pressure is greater than 120/80, as reported below, we urge that you seek medical care to address the potential of high blood pressure, commonly known as hypertension. Hypertension can be hereditary or can be caused by certain medical conditions, pain, stress, or \"white coat syndrome. \"       Please make an appointment with your health care provider(s) for follow up of your Emergency Department visit. VITALS:   Patient Vitals for the past 8 hrs:   Temp Pulse Resp BP SpO2   02/20/17 2007 98.1 °F (36.7 °C) 77 18 159/82 98 %          Thank you for allowing us to provide you with medical care today. We realize that you have many choices for your emergency care needs. Please choose us in the future for any continued health care needs. Karishma Valer[facility], [address] [facility] [place]: [phone]            No results found for this or any previous visit (from the past 24 hour(s)). Xr Humerus Lt    Result Date: 2/21/2017  EXAM:  XR HUMERUS LT INDICATION:   Trauma. COMPARISON: 2011. FINDINGS: Two views of the left humerus demonstrate no fracture or other acute osseous, articular or soft tissue abnormality. IMPRESSION:  No acute abnormality. Xr Ankle Lt Min 3 V    Result Date: 2/21/2017  EXAM:  XR ANKLE LT MIN 3 V INDICATION:  Trauma. COMPARISON: None. FINDINGS: Three views of the left ankle demonstrate no fracture or disruption of the ankle mortise. There is no other acute osseous or articular abnormality. The soft tissues are within normal limits. There are inferior and posterior calcaneal spurs. There are atherosclerotic vascular calcifications. IMPRESSION: No acute abnormality. Ct Head Wo Cont    Result Date: 2/20/2017  INDICATION: fall, left sided numbness Exam: Noncontrast CT of the brain is performed with 5 mm collimation.  CT dose reduction was achieved with the use of the standardized protocol tailored for this examination and automatic exposure control for dose modulation. Adaptive statistical iterative reconstruction (ASIR) was utilized. Direct comparison is made to prior CT dated July 2016. FINDINGS: There is no acute intracranial hemorrhage, mass, mass effect or herniation. Ventricular system is normal. The gray-white matter differentiation is well-preserved. The mastoid air cells are well pneumatized. The visualized paranasal sinuses are normal.     IMPRESSION: No acute intracranial hemorrhage, mass or infarct. Ct Spine Cerv Wo Cont    Result Date: 2/20/2017  EXAM:  CT CERVICAL SPINE WITHOUT CONTRAST INDICATION:   Trauma, fall. Neck and shoulder pain. COMPARISON: 7/4/2016 TECHNIQUE: Multislice helical CT of the cervical spine was performed without intravenous contrast administration. Sagittal and coronal reconstructions were generated. CT dose reduction was achieved through use of a standardized protocol tailored for this examination and automatic exposure control for dose modulation. CONTRAST: None. FINDINGS: There is straightening of the cervical lordosis. Anterior osteophytes with bridging at C2-C7, degenerative change of the anterior C1-C2 articulation with old erosion of the odontoid, and narrowed discs at C5-C7 are again seen. Old deformity/prior left laminectomy at C6-C7 are seen. There is no acute fracture or subluxation. The craniocervical junction is within normal limits. The prevertebral soft tissues are within normal limits. Carotid vascular calcification is noted. IMPRESSION: Multilevel degenerative disease. No acute fracture     Xr Knee Lt 3 V    Result Date: 2/21/2017  EXAM:  XR KNEE LT 3 V INDICATION:   Trauma. COMPARISON: 2012. FINDINGS: Three views of the left knee demonstrate tricompartmental osteophyte formation. There is no effusion. IMPRESSION:  No acute abnormality.

## 2018-05-22 NOTE — PROGRESS NOTE ADULT - ATTENDING COMMENTS
Skin lines returning, edema & erythema improved  Low likelihood of osteomyelitis considering clinical picture, no collections noted on MRI  Labs and vitals stable  recommend daily soaking with epsom salt, daily dressing changes with bactroban and DSD  okay to discharge from podiatry standpoint on 1 week of Augmentin   follow up at the wound care center within 1 week of discharge Skin lines returning, edema & erythema improved  Low likelihood of osteomyelitis considering clinical picture, no collections noted on MRI  Labs and vitals stable  recommend daily soaking with epsom salt, daily dressing changes with bactroban and DSD  okay to discharge from podiatry standpoint on 1 week of Augmentin   follow up at the wound care center within 1 week of discharge- 6/4/18 with Dr Russell.    MRI ? osteo in 5 th digit- not convinced- he will do Po abx and monitor patient with labs , f/u exam and studies in office.    45 min with coordination of discharge Skin lines returning, edema & erythema improved  Low likelihood of osteomyelitis considering clinical picture, no collections noted on MRI  Labs and vitals stable  recommend daily soaking with epsom salt, daily dressing changes with bactroban and DSD  okay to discharge from podiatry standpoint on 1 week of Augmentin   follow up at the wound care center within 1 week of discharge- 6/4/18 with Dr Russell.    MRI ? osteo in 5 th digit- not convinced- he will do Po abx and monitor patient with labs , f/u exam and studies in office.  Called [facility] [phone] patient takes lasix 20 mg qd, asa 81 mg qd, Lisinopril 5 mg qd, Metoprolol Er 50 mg qd.  PCP Dr Inocencio Knapp at [phone]- called     45 min with coordination of discharge Skin lines returning, edema & erythema improved  Low likelihood of osteomyelitis considering clinical picture, no collections noted on MRI  Labs and vitals stable  recommend daily soaking with epsom salt, daily dressing changes with bactroban and DSD  okay to discharge from podiatry standpoint on 1 week of Augmentin   follow up at the wound care center within 1 week of discharge- 6/4/18 with Dr Russell.    MRI ? osteo in 5 th digit- not convinced- he will do Po abx and monitor patient with labs , f/u exam and studies in office.  Called [facility] [phone] patient takes lasix 20 mg qd, asa 81 mg qd, Lisinopril 5 mg qd, Metoprolol Er 50 mg qd.  PCP Dr Inocencio Knapp at [phone]- called Creat in Nov 2017 was 1.7- Patient has CKD and out patient f/u with PCP - resume home meds    45 min with coordination of discharge

## 2018-05-22 NOTE — DISCHARGE NOTE ADULT - CARE PROVIDERS DIRECT ADDRESSES
,DirectAddress_Unknown ,DirectAddress_Unknown,[email or web address]

## 2018-05-22 NOTE — DISCHARGE NOTE ADULT - CARE PROVIDER_API CALL
Jerald Knapp), Adv [facility] Fail [facility] Cardio; Cardiovascular Disease; Internal Medicine  [address] [place] [address]  Phone: [phone]  Fax: [phone] Jerald Knapp), Adv [facility] Fail [facility]nspl[facility] Cardio; Cardiovascular Disease; Internal Medicine  [address] [place] [address]  Phone: [phone]  Fax: [phone]    Otto Russell (TAHIR), Podiatric Medicine and Surgery  [address] [place] [address]  Phone: [phone]  Fax: [phone]

## 2018-05-22 NOTE — PROGRESS NOTE ADULT - SUBJECTIVE AND OBJECTIVE BOX
Patient is a 68y old  Male who presents with a chief complaint of R foot swelling and pain (19 May 2018 10:58)      SUBJECTIVE / OVERNIGHT EVENTS:  Seen by Pod after MRI- rec d/c     MEDICATIONS  (STANDING):  aspirin  chewable 81 milliGRAM(s) Oral daily  cefepime   IVPB 1000 milliGRAM(s) IV Intermittent every 12 hours  enoxaparin Injectable 40 milliGRAM(s) SubCutaneous every 24 hours  hydrALAZINE 10 milliGRAM(s) Oral every 8 hours  metoprolol succinate ER 50 milliGRAM(s) Oral daily  sodium chloride 0.9%. 750 milliLiter(s) (150 mL/Hr) IV Continuous <Continuous>  vancomycin  IVPB 750 milliGRAM(s) IV Intermittent every 12 hours    MEDICATIONS  (PRN):  acetaminophen   Tablet. 650 milliGRAM(s) Oral every 6 hours PRN Mild and Moderate pain and headache        PHYSICAL EXAM:  Vital Signs Last 24 Hrs  T(C): 36.7 (22 May 2018 05:48), Max: 36.9 (22 May 2018 02:31)  T(F): 98 (22 May 2018 05:48), Max: 98.5 (22 May 2018 02:31)  HR: 65 (22 May 2018 05:48) (55 - 66)  BP: 125/87 (22 May 2018 05:48) (121/74 - 139/92)  BP(mean): --  RR: 18 (22 May 2018 05:48) (17 - 18)  SpO2: 99% (22 May 2018 05:48) (97% - 99%)  GENERAL: NAD, well-developed  HEAD:  Atraumatic, Normocephalic  EYES: EOMI, PERRLA, conjunctiva and sclera clear  NECK: Supple, No JVD  CHEST/LUNG: Clear to auscultation bilaterally; No wheeze  HEART: Regular rate and rhythm; No murmurs, rubs, or gallops  ABDOMEN: Soft, Nontender, Nondistended; Bowel sounds present  EXTREMITIES:  2+ Peripheral Pulses, No clubbing, cyanosis, or edema  R foot dressing  PSYCH: AAOx3  NEUROLOGY: non-focal  SKIN: No rashes or lesions    LABS:                        12.6   5.00  )-----------( 243      ( 22 May 2018 03:50 )             37.2     05-22    134<L>  |  100  |  21  ----------------------------<  95  4.7   |  23  |  1.68<H>    Ca    8.5      22 May 2018 03:50        CARDIAC MARKERS ( 22 May 2018 03:50 )  x     / < 0.06 ng/mL / 128 u/L / 2.91 ng/mL / x              RADIOLOGY & ADDITIONAL TESTS:    Imaging Personally Reviewed:    Consultant(s) Notes Reviewed:      Care Discussed with Consultants/Other Providers:

## 2018-05-22 NOTE — PROVIDER CONTACT NOTE (OTHER) - ACTION/TREATMENT ORDERED:
No further action at this time
ADS made aware. EKG in chart. No further action at this time. Will continue to monitor.
WILL OBTAIN ORDERED LABS AND CLOSELY MONITOR AND REASSESS

## 2018-05-22 NOTE — PROGRESS NOTE ADULT - ASSESSMENT
Patient is a 69 y/o man with hypertension, bradycardia who presents with R foot pain x 1 week.    *R foot swelling, pain: abscess drained by podiatry. Likely has surrounding soft tissue infection. R foot x-ray with possible R 5th toe distal phalanx osteomyelitis  - MRI foot noncontrast as recommended by podiatry  - need for operative management as per podiatry  - broad spectrum abx - cefepime, vancomycin, renally adjusted dosing  - f/u abscess culture  - tylenol PRN pain  - bowel regimen    *HTN: BP elevated in ED and on floor  - at home on lisinopril 5 mg, Lasix 20 mg, and Toprol XL 50 mg  - resume Toprol XL  - Cr 1.64 - patient denies knowledge of chronic kidney disease  - home lisinopril, Lasix until baseline renal function verified  - patient had been prescribed hydralazine in December of 2017 per pharmacy - [phone]  - will start hydralazine 10 mg q8h for BP control while verifying baseline kidney function  - monitor BP closely    *Elevated Cr: possible LIZBETH, possible stage 3 CKD  - attempt to obtain baseline Cr from PCP  - appears dry on exam, give  CC over 3 hours  - repeat Cr this evening  - renally dose all medications    *H/o bradycardia: HR currently normal. Per patient he had HR in 30's in the past, refused PPM as "I don't want surgery at my age"  - given elevated BP and now normal HR, resume home Toprol XL 50 mg daily  - EKG  - monitor HR closely    *Pre-diabetes: patient aware blood glucose elevated, recommend lifestyle change    *FEN/GI: low Na diet    *Ppx: SC lovenox    *Dispo: pending w/u as above and medical stability  - OOBTC Patient is a 69 y/o man with hypertension, bradycardia who presents with R foot pain x 1 week.    *R foot swelling, pain: abscess drained by podiatry. Likely has surrounding soft tissue infection. R foot x-ray with possible R 5th toe distal phalanx osteomyelitis  - MRI foot noncontrast as recommended by podiatry showed  < from: MR Foot w/wo IV Cont, Right (05.21.18 @ 19:32) >  IMPRESSION:  Forefoot cellulitic change.     No discrete drainable abscess collections.    Osteomyelitis involving the block 5th distal phalanx and distal portion   of the 5th proximal phalanx.    Hallux valgus deformity with bunion and 1st MTP joint osteoarthritic   change.    < end of copied text >    - need for operative management as per podiatry  - broad spectrum abx - cefepime, vancomycin- now changed to Po Augmentin x 2 weeks and F/u with Pod Dr Russell on 6/4/18  - abscess culture- polymycrobial  - tylenol PRN pain  - bowel regimen    *HTN: BP elevated in ED and on floor  - at home on lisinopril 5 mg, Lasix 20 mg, and Toprol XL 50 mg  - resume Toprol XL  - Cr 1.64 - patient denies knowledge of chronic kidney disease      *Elevated Cr: possible LIZBETH, possible stage 3 CKD  - attempt to obtain baseline Cr from PCP  - appears dry on exam, give  CC over 3 hours  - repeat Cr this evening  - renally dose all medications    *H/o bradycardia: HR currently normal. Per patient he had HR in 30's in the past, refused PPM as "I don't want surgery at my age"  - given elevated BP and now normal HR, resume home Toprol XL 50 mg daily  - EKG  - monitor HR closely    *Pre-diabetes: patient aware blood glucose elevated, recommend lifestyle change    *FEN/GI: low Na diet    *Ppx: SC heparin

## 2018-05-22 NOTE — DISCHARGE NOTE ADULT - PLAN OF CARE
wound healing - weightbearing as tolerated in surgical shoe  - Take Augmentin 875mg twice daily for 7 days  - Take dressing down daily and soak foot in warm water and epsom salt, after that apply a new dressing with mupirocin ointment on the wound and cover with 4x4 gauze and kerlix.  - follow up with Dr. Russell within 1 week at the wound care center (call  to make appointment) - weightbearing as tolerated in surgical shoe  - Take Augmentin 875mg twice daily for 7 days  - Take dressing down daily and soak foot in warm water and epsom salt, after that apply a new dressing with mupirocin ointment on the wound and cover with 4x4 gauze and kerlix.  - follow up with Dr. Russell within 1 week at the wound care center (call  to make appointment)- wants patient to be seen on Monday 6/4/18 stable c/w Lasix/Enalapril , metoprolol ER and follow up with PCP Dr Megan Knapp at [phone] c/w home meds

## 2018-05-22 NOTE — DISCHARGE NOTE ADULT - ADDITIONAL INSTRUCTIONS
Please follow up with Podiatry Dr Russell on 6/4/18.  Please follow up with PCP Dr Knapp in 1 to 2 weeks.

## 2018-05-22 NOTE — PROGRESS NOTE ADULT - ASSESSMENT
67yo M w/ RF 4th interspace abscess s/p bedside I&D  ·	Pt seen and evaluated  ·	Skin lines returning, edema & erythema improved  ·	Low likelihood of osteomyelitis considering clinical picture, no collections noted on MRI  ·	Labs and vitals stable  ·	recommend daily soaking with epsom salt, daily dressing changes with bactroban and DSD  ·	okay to discharge from podiatry standpoint on 1 week of Augmentin   follow up at the wound care center within 1 week of discharge

## 2018-05-22 NOTE — DISCHARGE NOTE ADULT - MEDICATION SUMMARY - MEDICATIONS TO TAKE
I will START or STAY ON the medications listed below when I get home from the hospital:    aspirin 81 mg oral tablet  -- 1 tab(s) by mouth once a day  -- Indication: For Need for PPX     lisinopril  -- 5 milligram(s) by mouth once a day  -- Indication: For HTN (hypertension)    Metoprolol Succinate ER 50 mg oral tablet, extended release  -- 1 tab(s) by mouth once a day  -- Indication: For HTN (hypertension)    furosemide 20 mg oral tablet  -- 1 tab(s) by mouth once a day  -- Indication: For HTN (hypertension)    Augmentin 875 mg-125 mg oral tablet  -- 1 tab(s) by mouth every 12 hours   -- Finish all this medication unless otherwise directed by prescriber.  Take with food or milk.    -- Indication: For Right Foot infection

## 2018-05-22 NOTE — PROGRESS NOTE ADULT - SUBJECTIVE AND OBJECTIVE BOX
Patient is a 68y old  Male who presents with a chief complaint of R foot swelling and pain (19 May 2018 10:58)       INTERVAL HPI/OVERNIGHT EVENTS:  Patient seen and evaluated at bedside.  Pt is resting comfortable in NAD. Feeling better with less pain in the foot with ambulation.    Allergies    No Known Allergies    Intolerances        Vital Signs Last 24 Hrs  T(C): 36.7 (22 May 2018 05:48), Max: 36.9 (22 May 2018 02:31)  T(F): 98 (22 May 2018 05:48), Max: 98.5 (22 May 2018 02:31)  HR: 65 (22 May 2018 05:48) (55 - 66)  BP: 125/87 (22 May 2018 05:48) (121/74 - 139/92)  BP(mean): --  RR: 18 (22 May 2018 05:48) (17 - 18)  SpO2: 99% (22 May 2018 05:48) (97% - 99%)    LABS:                        12.6   5.00  )-----------( 243      ( 22 May 2018 03:50 )             37.2     05-22    134<L>  |  100  |  21  ----------------------------<  95  4.7   |  23  |  1.68<H>    Ca    8.5      22 May 2018 03:50          CAPILLARY BLOOD GLUCOSE          Lower Extremity Physical Exam:  Right foot 4th interspace stable with no drainage noted and no tissue necrosis.  Good CR to the 4th and 5th toes.  Edema decreased and resolving cellulitis noted.  Less pain on palpation and squeeze.        RADIOLOGY & ADDITIONAL TESTS:  < from: MR Foot w/wo IV Cont, Right (05.21.18 @ 19:32) >  EXAM:  MR FOOT WAW IC RT        PROCEDURE DATE:  May 21 2018         INTERPRETATION:  CLINICAL INDICATION: right 4th/5th toe infection;   evaluate for abscess and/or osteomyelitis    TECHNIQUE:  Multiplanar and multisequence right forefoot and midfoot MRI performed   before and after administration of 7 cc of Gadavist IV without reported   complications and with 0.5 cc discarded. No prior MRI studies available   for comparison. Reviewed in conjunction with right foot radiographs from   5/19/2018.    FINDINGS:  Dorsal forefoot subcutaneous swelling and stranding of postcontrast   enhancement compatible with cellulitic type change. No discrete   circumscribed rim-enhancing uniform fluid signal intensity collections   seen to indicate a discrete drainable abscess.    Enhancing edema and decreased T1 signal alteration involving the block   5th distal phalanx and distal portion of the 5th proximal phalanx   compatible with osteomyelitis. No additional focal areas of osteomyelitis.    Redemonstrated slight hallux valgus deformity with bunion and 1st MTP   joint arthritic change including evident focal subchondral cyst formation   in central portion of the metatarsal head. No additional marrow signal   abnormalities.    No fractures or dislocations.    Lisfranc ligament visualized and intact with tarsometatarsal alignment   maintained.    Visualized musculature demonstrate normal morphology and signal.  No discrete osseous or soft tissue mass lesions.    IMPRESSION:  Forefoot cellulitic change.     No discrete drainable abscess collections.    Osteomyelitis involving the block 5th distal phalanx and distal portion   of the 5th proximal phalanx.    Hallux valgus deformity with bunion and 1st MTP joint osteoarthritic   change.                  CUBA ELLIOTT M.D., ATTENDING RADIOLOGIST    < end of copied text >

## 2018-05-22 NOTE — DISCHARGE NOTE ADULT - PATIENT PORTAL LINK FT
You can access the [facility] Patient Portal, offered by [facility], by registering with the following website: [email or web address]

## 2018-05-24 LAB
BACTERIA BLD CULT: SIGNIFICANT CHANGE UP
BACTERIA BLD CULT: SIGNIFICANT CHANGE UP

## 2018-05-29 PROBLEM — I10 ESSENTIAL (PRIMARY) HYPERTENSION: Chronic | Status: ACTIVE | Noted: 2018-05-19

## 2018-05-29 PROBLEM — Z00.00 ENCOUNTER FOR PREVENTIVE HEALTH EXAMINATION: Status: ACTIVE | Noted: 2018-05-29

## 2018-06-14 ENCOUNTER — APPOINTMENT (OUTPATIENT)
Dept: WOUND CARE | Facility: CLINIC | Age: 68
End: 2018-06-14
Payer: MEDICAID

## 2018-06-14 DIAGNOSIS — L84 CORNS AND CALLOSITIES: ICD-10-CM

## 2018-06-14 PROCEDURE — 99203 OFFICE O/P NEW LOW 30 MIN: CPT

## 2020-02-28 ENCOUNTER — INPATIENT (INPATIENT)
Facility: HOSPITAL | Age: 70
LOS: 4 days | Discharge: HOME | End: 2020-03-04
Attending: INTERNAL MEDICINE | Admitting: INTERNAL MEDICINE
Payer: MEDICAID

## 2020-02-28 VITALS
SYSTOLIC BLOOD PRESSURE: 161 MMHG | TEMPERATURE: 97 F | HEART RATE: 98 BPM | RESPIRATION RATE: 20 BRPM | OXYGEN SATURATION: 95 % | DIASTOLIC BLOOD PRESSURE: 111 MMHG

## 2020-02-28 DIAGNOSIS — I10 ESSENTIAL (PRIMARY) HYPERTENSION: ICD-10-CM

## 2020-02-28 DIAGNOSIS — I50.9 HEART FAILURE, UNSPECIFIED: ICD-10-CM

## 2020-02-28 DIAGNOSIS — N18.3 CHRONIC KIDNEY DISEASE, STAGE 3 (MODERATE): ICD-10-CM

## 2020-02-28 DIAGNOSIS — Z29.9 ENCOUNTER FOR PROPHYLACTIC MEASURES, UNSPECIFIED: ICD-10-CM

## 2020-02-28 LAB
ALBUMIN SERPL ELPH-MCNC: 3.9 G/DL — SIGNIFICANT CHANGE UP (ref 3.3–5)
ALP SERPL-CCNC: 79 U/L — SIGNIFICANT CHANGE UP (ref 40–120)
ALT FLD-CCNC: 15 U/L — SIGNIFICANT CHANGE UP (ref 4–41)
ANION GAP SERPL CALC-SCNC: 13 MMO/L — SIGNIFICANT CHANGE UP (ref 7–14)
APPEARANCE UR: CLEAR — SIGNIFICANT CHANGE UP
APTT BLD: 28.9 SEC — SIGNIFICANT CHANGE UP (ref 27.5–36.3)
AST SERPL-CCNC: 27 U/L — SIGNIFICANT CHANGE UP (ref 4–40)
BACTERIA # UR AUTO: NEGATIVE — SIGNIFICANT CHANGE UP
BASE EXCESS BLDV CALC-SCNC: -2 MMOL/L — SIGNIFICANT CHANGE UP
BASOPHILS # BLD AUTO: 0.04 K/UL — SIGNIFICANT CHANGE UP (ref 0–0.2)
BASOPHILS NFR BLD AUTO: 0.7 % — SIGNIFICANT CHANGE UP (ref 0–2)
BILIRUB SERPL-MCNC: 0.8 MG/DL — SIGNIFICANT CHANGE UP (ref 0.2–1.2)
BILIRUB UR-MCNC: NEGATIVE — SIGNIFICANT CHANGE UP
BLOOD GAS VENOUS - CREATININE: 1.66 MG/DL — HIGH (ref 0.5–1.3)
BLOOD GAS VENOUS - FIO2: 21 — SIGNIFICANT CHANGE UP
BLOOD UR QL VISUAL: NEGATIVE — SIGNIFICANT CHANGE UP
BUN SERPL-MCNC: 14 MG/DL — SIGNIFICANT CHANGE UP (ref 7–23)
CALCIUM SERPL-MCNC: 8.7 MG/DL — SIGNIFICANT CHANGE UP (ref 8.4–10.5)
CHLORIDE BLDV-SCNC: 109 MMOL/L — HIGH (ref 96–108)
CHLORIDE SERPL-SCNC: 108 MMOL/L — HIGH (ref 98–107)
CO2 SERPL-SCNC: 21 MMOL/L — LOW (ref 22–31)
COLOR SPEC: SIGNIFICANT CHANGE UP
CREAT SERPL-MCNC: 1.61 MG/DL — HIGH (ref 0.5–1.3)
EOSINOPHIL # BLD AUTO: 0.03 K/UL — SIGNIFICANT CHANGE UP (ref 0–0.5)
EOSINOPHIL NFR BLD AUTO: 0.5 % — SIGNIFICANT CHANGE UP (ref 0–6)
GAS PNL BLDV: 139 MMOL/L — SIGNIFICANT CHANGE UP (ref 136–146)
GLUCOSE BLDV-MCNC: 116 MG/DL — HIGH (ref 70–99)
GLUCOSE SERPL-MCNC: 117 MG/DL — HIGH (ref 70–99)
GLUCOSE UR-MCNC: NEGATIVE — SIGNIFICANT CHANGE UP
HCO3 BLDV-SCNC: 22 MMOL/L — SIGNIFICANT CHANGE UP (ref 20–27)
HCT VFR BLD CALC: 44.1 % — SIGNIFICANT CHANGE UP (ref 39–50)
HCT VFR BLDV CALC: 44.4 % — SIGNIFICANT CHANGE UP (ref 39–51)
HGB BLD-MCNC: 14.2 G/DL — SIGNIFICANT CHANGE UP (ref 13–17)
HGB BLDV-MCNC: 14.5 G/DL — SIGNIFICANT CHANGE UP (ref 13–17)
HYALINE CASTS # UR AUTO: NEGATIVE — SIGNIFICANT CHANGE UP
IMM GRANULOCYTES NFR BLD AUTO: 0.2 % — SIGNIFICANT CHANGE UP (ref 0–1.5)
INR BLD: 0.98 — SIGNIFICANT CHANGE UP (ref 0.88–1.17)
KETONES UR-MCNC: NEGATIVE — SIGNIFICANT CHANGE UP
LACTATE BLDV-MCNC: 3.4 MMOL/L — HIGH (ref 0.5–2)
LEUKOCYTE ESTERASE UR-ACNC: NEGATIVE — SIGNIFICANT CHANGE UP
LYMPHOCYTES # BLD AUTO: 1.04 K/UL — SIGNIFICANT CHANGE UP (ref 1–3.3)
LYMPHOCYTES # BLD AUTO: 17.5 % — SIGNIFICANT CHANGE UP (ref 13–44)
MCHC RBC-ENTMCNC: 27.8 PG — SIGNIFICANT CHANGE UP (ref 27–34)
MCHC RBC-ENTMCNC: 32.2 % — SIGNIFICANT CHANGE UP (ref 32–36)
MCV RBC AUTO: 86.3 FL — SIGNIFICANT CHANGE UP (ref 80–100)
MONOCYTES # BLD AUTO: 0.3 K/UL — SIGNIFICANT CHANGE UP (ref 0–0.9)
MONOCYTES NFR BLD AUTO: 5.1 % — SIGNIFICANT CHANGE UP (ref 2–14)
NEUTROPHILS # BLD AUTO: 4.51 K/UL — SIGNIFICANT CHANGE UP (ref 1.8–7.4)
NEUTROPHILS NFR BLD AUTO: 76 % — SIGNIFICANT CHANGE UP (ref 43–77)
NITRITE UR-MCNC: NEGATIVE — SIGNIFICANT CHANGE UP
NRBC # FLD: 0 K/UL — SIGNIFICANT CHANGE UP (ref 0–0)
PCO2 BLDV: 40 MMHG — LOW (ref 41–51)
PH BLDV: 7.37 PH — SIGNIFICANT CHANGE UP (ref 7.32–7.43)
PH UR: 6.5 — SIGNIFICANT CHANGE UP (ref 5–8)
PLATELET # BLD AUTO: 238 K/UL — SIGNIFICANT CHANGE UP (ref 150–400)
PMV BLD: 10.2 FL — SIGNIFICANT CHANGE UP (ref 7–13)
PO2 BLDV: 48 MMHG — HIGH (ref 35–40)
POTASSIUM BLDV-SCNC: 3.7 MMOL/L — SIGNIFICANT CHANGE UP (ref 3.4–4.5)
POTASSIUM SERPL-MCNC: 4.1 MMOL/L — SIGNIFICANT CHANGE UP (ref 3.5–5.3)
POTASSIUM SERPL-SCNC: 4.1 MMOL/L — SIGNIFICANT CHANGE UP (ref 3.5–5.3)
PROT SERPL-MCNC: 7.5 G/DL — SIGNIFICANT CHANGE UP (ref 6–8.3)
PROT UR-MCNC: 20 — SIGNIFICANT CHANGE UP
PROTHROM AB SERPL-ACNC: 11.3 SEC — SIGNIFICANT CHANGE UP (ref 9.8–13.1)
RBC # BLD: 5.11 M/UL — SIGNIFICANT CHANGE UP (ref 4.2–5.8)
RBC # FLD: 13.8 % — SIGNIFICANT CHANGE UP (ref 10.3–14.5)
RBC CASTS # UR COMP ASSIST: SIGNIFICANT CHANGE UP (ref 0–?)
SAO2 % BLDV: 80.8 % — SIGNIFICANT CHANGE UP (ref 60–85)
SODIUM SERPL-SCNC: 142 MMOL/L — SIGNIFICANT CHANGE UP (ref 135–145)
SP GR SPEC: 1.01 — SIGNIFICANT CHANGE UP (ref 1–1.04)
SQUAMOUS # UR AUTO: SIGNIFICANT CHANGE UP
TROPONIN T, HIGH SENSITIVITY: 27 NG/L — SIGNIFICANT CHANGE UP (ref ?–14)
UROBILINOGEN FLD QL: NORMAL — SIGNIFICANT CHANGE UP
WBC # BLD: 5.93 K/UL — SIGNIFICANT CHANGE UP (ref 3.8–10.5)
WBC # FLD AUTO: 5.93 K/UL — SIGNIFICANT CHANGE UP (ref 3.8–10.5)
WBC UR QL: SIGNIFICANT CHANGE UP (ref 0–?)

## 2020-02-28 PROCEDURE — 71046 X-RAY EXAM CHEST 2 VIEWS: CPT | Mod: 26

## 2020-02-28 RX ORDER — ASPIRIN/CALCIUM CARB/MAGNESIUM 324 MG
1 TABLET ORAL
Qty: 0 | Refills: 0 | DISCHARGE

## 2020-02-28 RX ORDER — METOPROLOL TARTRATE 50 MG
1 TABLET ORAL
Qty: 0 | Refills: 0 | DISCHARGE

## 2020-02-28 RX ORDER — CARVEDILOL PHOSPHATE 80 MG/1
3.12 CAPSULE, EXTENDED RELEASE ORAL EVERY 12 HOURS
Refills: 0 | Status: DISCONTINUED | OUTPATIENT
Start: 2020-02-28 | End: 2020-03-02

## 2020-02-28 RX ORDER — LISINOPRIL 2.5 MG/1
5 TABLET ORAL
Qty: 0 | Refills: 0 | DISCHARGE

## 2020-02-28 RX ORDER — ASPIRIN/CALCIUM CARB/MAGNESIUM 324 MG
324 TABLET ORAL ONCE
Refills: 0 | Status: COMPLETED | OUTPATIENT
Start: 2020-02-28 | End: 2020-02-28

## 2020-02-28 RX ORDER — FUROSEMIDE 40 MG
1 TABLET ORAL
Qty: 0 | Refills: 0 | DISCHARGE

## 2020-02-28 RX ORDER — INFLUENZA VIRUS VACCINE 15; 15; 15; 15 UG/.5ML; UG/.5ML; UG/.5ML; UG/.5ML
0.5 SUSPENSION INTRAMUSCULAR ONCE
Refills: 0 | Status: COMPLETED | OUTPATIENT
Start: 2020-02-28 | End: 2020-03-04

## 2020-02-28 RX ORDER — HEPARIN SODIUM 5000 [USP'U]/ML
5000 INJECTION INTRAVENOUS; SUBCUTANEOUS EVERY 8 HOURS
Refills: 0 | Status: DISCONTINUED | OUTPATIENT
Start: 2020-02-28 | End: 2020-03-02

## 2020-02-28 RX ORDER — FUROSEMIDE 40 MG
20 TABLET ORAL
Refills: 0 | Status: DISCONTINUED | OUTPATIENT
Start: 2020-02-28 | End: 2020-03-02

## 2020-02-28 RX ORDER — ASPIRIN/CALCIUM CARB/MAGNESIUM 324 MG
81 TABLET ORAL DAILY
Refills: 0 | Status: DISCONTINUED | OUTPATIENT
Start: 2020-02-28 | End: 2020-03-02

## 2020-02-28 RX ORDER — ACETAMINOPHEN 500 MG
650 TABLET ORAL EVERY 6 HOURS
Refills: 0 | Status: DISCONTINUED | OUTPATIENT
Start: 2020-02-28 | End: 2020-03-02

## 2020-02-28 RX ADMIN — HEPARIN SODIUM 5000 UNIT(S): 5000 INJECTION INTRAVENOUS; SUBCUTANEOUS at 21:23

## 2020-02-28 RX ADMIN — Medication 324 MILLIGRAM(S): at 14:24

## 2020-02-28 RX ADMIN — Medication 20 MILLIGRAM(S): at 18:08

## 2020-02-28 RX ADMIN — CARVEDILOL PHOSPHATE 3.12 MILLIGRAM(S): 80 CAPSULE, EXTENDED RELEASE ORAL at 18:08

## 2020-02-28 NOTE — ED PROVIDER NOTE - NS ED ROS FT
General: denies fever, chills  HENT: denies nasal congestion, sore throat, rhinorrhea  Eyes: denies vision changes  CV: denies chest pain  Resp: +difficulty breathing, cough  Abdominal: denies nausea, vomiting, diarrhea, abdominal pain, blood in stool, dark stool  : denies pain with urination  MSK: denies recent trauma  Neuro: denies headaches, numbness, tingling, dizziness, lightheadedness.  Skin: denies new rashes  Endocrine: denies recent weight loss

## 2020-02-28 NOTE — ED ADULT NURSE NOTE - OBJECTIVE STATEMENT
received pt in bed A and Ox 3  in NAD resting comfortably, pt reports had difficulty breathing earlier today but now reports SOB resolved, denies hx of Asthma, COPD, breathing is even and unlabored, lung sounds clear B/l, communicates with full and complete sentences. IV in place by EMS. pt denies c/p, fever,. chills, cough.   labs orders noted and sent.

## 2020-02-28 NOTE — ED PROVIDER NOTE - PHYSICAL EXAMINATION
CONSTITUTIONAL: Nontoxic, well nourished, well developed, elderly male, resting comfortably in no acute distress  HEAD: Normocephalic; atraumatic  EYES: Normal inspection, EOMI  ENMT: External appears normal; normal oropharynx  NECK: Supple; non-tender; no cervical lymphadenopathy  CARD: RRR; no audible murmurs, rubs, or gallops  RESP: No respiratory distress, bibasilar crackles   ABD: Soft, non-distended; non-tender; no rebound or guarding  EXT: 1+ b/l LE pitting edema, no calf tenderness; distal pulses intact with good capillary refill  SKIN: Warm, dry, intact  NEURO: aaox3, moving all extremities spontaneously

## 2020-02-28 NOTE — H&P ADULT - ASSESSMENT
70yo M with a PmHx of CKD stage III and untreated HTN presents with shortness of breath that began this morning. Patient is being admitted to telemetry to rule out ACS vs. new diagnosis of an acute exacerbation of heart failure. 68 y/o Male, with a PmHx of CKD stage III and untreated HTN, presents with shortness of breath that began this morning. Patient is being admitted to telemetry to rule out ACS vs. new diagnosis of an acute exacerbation of heart failure.

## 2020-02-28 NOTE — H&P ADULT - NEGATIVE OPHTHALMOLOGIC SYMPTOMS
no photophobia/no diplopia no photophobia/no diplopia/no loss of vision L/no blurred vision L/no blurred vision R/no loss of vision R

## 2020-02-28 NOTE — H&P ADULT - RS GEN PE MLT RESP DETAILS PC
no chest wall tenderness/no rhonchi/no subcutaneous emphysema/no wheezes/normal/respirations non-labored/airway patent/breath sounds equal/good air movement/clear to auscultation bilaterally/no intercostal retractions/no rales

## 2020-02-28 NOTE — ED ADULT NURSE REASSESSMENT NOTE - NS ED NURSE REASSESS COMMENT FT1
floor called at [phone] at ext [phone] to give report for bed 407b. spoke to PING Diaz, who said RN Светлана is unable to take report and that bed is not ready.
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
## 2020-02-28 NOTE — H&P ADULT - PROBLEM SELECTOR PLAN 2
BUN: Creatinine 16:1.61  Avoid nephrotoxic agents, consult renal BUN: Creatinine 16:1.61  Avoid nephrotoxic agents, consider renal consult

## 2020-02-28 NOTE — ED PROVIDER NOTE - CLINICAL SUMMARY MEDICAL DECISION MAKING FREE TEXT BOX
Vanessa Ayala MD: 70yo M with untreated HTN who presents with SOB this AM. Pt states he was drinking coffee when he became acutely SOB, resolved with 2x duonebs and 12mg dexamethasone given by EMS. Pt hemodynamically stable, afebrile. Crackles b/l with LE edema concerning for ACS vs heart failure. Will get labs, trop, pro-bnp, CXR, EKG, given aspirin, likely admit vs CDU for further cardiac testing

## 2020-02-28 NOTE — H&P ADULT - NEGATIVE GASTROINTESTINAL SYMPTOMS
no change in bowel habits/no melena/no hematochezia/no vomiting/no diarrhea/no flatulence/no nausea/no constipation/no abdominal pain

## 2020-02-28 NOTE — ED PROVIDER NOTE - OBJECTIVE STATEMENT
Vanessa Ayala MD: 70yo M with untreated HTN who presents with SOB this AM. Pt states he was drinking coffee when he Vanessa Ayala MD: 70yo M with untreated HTN who presents with SOB this AM. Pt states he was drinking coffee when he became acutely SOB, resolved with 2x duonebs and 12mg dexamethasone given by EMS. Pt does not see a PMD or take medication for HTN. As per niece, pt had been considered for a pacemaker but had declined it, unknown for what. No chest pain, fever, chills, URI symptoms, diaphoresis, N/V, syncope, lightheadedness, irregular rhythm, palpitations, or focal neuro deficit.

## 2020-02-28 NOTE — H&P ADULT - GASTROINTESTINAL DETAILS
no bruit/no rebound tenderness/no distention/soft/normal/nontender/no masses palpable/bowel sounds normal/no guarding/no organomegaly/no rigidity

## 2020-02-28 NOTE — ED PROVIDER NOTE - ATTENDING CONTRIBUTION TO CARE
68yo M with untreated HTN who presents with SOB this AM. Pt states he was drinking coffee when he became acutely SOB, resolved with 2x duonebs and 12mg dexamethasone given by EMS. Pt hemodynamically stable, afebrile. Crackles b/l with LE edema concerning for ACS vs heart failure. Will get labs, trop, pro-bnp, CXR, EKG, given aspirin, likely admit vs CDU for further cardiac testing    RANJIT Moraes: I have personally performed a face to face bedside history and physical examination of this patient. I have discussed the history, examination, review of systems, assessment and plan of management with the resident. I have reviewed the electronic medical record and amended it to reflect my history, review of systems, physical exam, assessment and plan.

## 2020-02-28 NOTE — H&P ADULT - NEUROLOGICAL DETAILS
alert and oriented x 3/responds to pain/responds to verbal commands/sensation intact cranial nerves intact/alert and oriented x 3/responds to pain/responds to verbal commands/sensation intact

## 2020-02-28 NOTE — H&P ADULT - HISTORY OF PRESENT ILLNESS
70yo M with a PmHx of CKD stage III and untreated HTN presents with shortness of breath that began this morning. Patient explains that over the past 6 months he has experienced dyspnea on exertion that has been increasing in severity. 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 This morning (2/28/20) patient experienced SOB at rest and on exertion for 15 minutes until EMS arrived. Patient also admits to palpitations at the time of SOB and claims he has never felt like this before.  Patient was given 12mg of dexamethasone and 2x Duonebs and has not been SOB since. As per ginger, December 2016 was when the patient received a cath and was told it was abnormal. In December 2017 at [facility], patient was offered an AICD however patient refused. Patient denies fevers, chills, diaphoresis, productive cough, chest pain, orthopnea, abdominal pain, nausea, vomiting, diarrhea, constipation, melena, hematochezia, dysuria, numbness or tingling, peripheral edema, sick contacts or recent travel. 68 y/o Male, with a PmHx of CKD stage III and untreated HTN, presents with shortness of breath that began this morning. Patient explains that over the past 6 months he has experienced dyspnea on exertion that has been increasing in severity. 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 This morning (2/28/20) patient experienced SOB at rest and on exertion for 15 minutes until EMS arrived. Patient also admits to palpitations at the time of SOB and claims he has never felt like this before.  Patient was given 12mg of dexamethasone and 2x Duonebs and has not been SOB since. As per ginger, December 2016 was when the patient received a cath and was told it was abnormal. In December 2017 at [facility], patient was offered an AICD however patient refused (unknown reason why he was told he needed an AICD). Patient denies fevers, chills, diaphoresis, productive cough, chest pain, orthopnea, abdominal pain, nausea, vomiting, diarrhea, constipation, melena, hematochezia, dysuria, numbness or tingling, peripheral edema, sick contacts or recent travel. He appears comfortable at this time and is being admitted to telemetry for ? new chf vs CAD.

## 2020-02-28 NOTE — H&P ADULT - NSHPSOCIALHISTORY_GEN_ALL_CORE
Patient works at a [employer] and lives with his niece. Patient was a former smoker (1/2 pack x 20 years) and denies any alcohol or illicit drug use.

## 2020-02-28 NOTE — H&P ADULT - NEGATIVE GENERAL SYMPTOMS
no weight gain/no fever/no chills/no sweating/no anorexia/no weight loss no weight gain/no chills/no sweating/no anorexia/no fatigue/no fever/no weight loss/no malaise

## 2020-02-28 NOTE — H&P ADULT - NSHPPHYSICALEXAM_GEN_ALL_CORE
Vital Signs Last 24 Hrs  T(C): 36.9 (28 Feb 2020 15:51), Max: 37.2 (28 Feb 2020 13:13)  T(F): 98.4 (28 Feb 2020 15:51), Max: 99 (28 Feb 2020 13:13)  HR: 91 (28 Feb 2020 15:51) (91 - 99)  BP: 153/103 (28 Feb 2020 15:51) (153/103 - 168/95)  BP(mean): --  RR: 19 (28 Feb 2020 15:51) (18 - 20)  SpO2: 98% (28 Feb 2020 15:51) (95% - 98%)    EKG:  NSR @ 98, LVH, T inv I, AVL, AVF, V4-6

## 2020-02-28 NOTE — ED ADULT NURSE NOTE - CHIEF COMPLAINT QUOTE
Pt c/o SOB starting this morning, Pt unable to speak in full sentences upon EMS arrival, given 2 duo nebs and 12mg of Dexamethasone. States he feels better now, denies fever, chills, cough, chest pain.  20g in left hand.
normal...

## 2020-02-28 NOTE — H&P ADULT - PROBLEM SELECTOR PLAN 1
Admitted to telemetry monitor  Rule out ACS, trend cardiac enzymes (last troponin 23)  Consult cardiology   Stress test vs. TTE   BNP:719, CXR: Clear lungs with no signs of pleural effusions or pneumothorax Admitted to telemetry monitor  Rule out ACS, trend cardiac enzymes (last troponin 23)  TTE  Consider ischemic eval  BNP:719, CXR: Clear lungs with no signs of pleural effusions or pneumothorax  probnp, daily wts, strict I & O 's , fluid restriction, pt c/s  started on lasix 20 iv bid

## 2020-02-28 NOTE — ED ADULT TRIAGE NOTE - CHIEF COMPLAINT QUOTE
Pt c/o SOB starting this morning, Pt unable to speak in full sentences upon EMS arrival, given 2 duo nebs and 12mg of Dexamethasone. States he feels better now, denies fever, chills, cough, chest pain.  20g in left hand.

## 2020-02-29 LAB
ANION GAP SERPL CALC-SCNC: 9 MMO/L — SIGNIFICANT CHANGE UP (ref 7–14)
APTT BLD: 28.6 SEC — SIGNIFICANT CHANGE UP (ref 27.5–36.3)
BASE EXCESS BLDV CALC-SCNC: -2.2 MMOL/L — SIGNIFICANT CHANGE UP
BASOPHILS # BLD AUTO: 0.01 K/UL — SIGNIFICANT CHANGE UP (ref 0–0.2)
BASOPHILS NFR BLD AUTO: 0.1 % — SIGNIFICANT CHANGE UP (ref 0–2)
BUN SERPL-MCNC: 23 MG/DL — SIGNIFICANT CHANGE UP (ref 7–23)
CALCIUM SERPL-MCNC: 9.3 MG/DL — SIGNIFICANT CHANGE UP (ref 8.4–10.5)
CHLORIDE SERPL-SCNC: 104 MMOL/L — SIGNIFICANT CHANGE UP (ref 98–107)
CHOLEST SERPL-MCNC: 108 MG/DL — LOW (ref 120–199)
CO2 SERPL-SCNC: 21 MMOL/L — LOW (ref 22–31)
CREAT SERPL-MCNC: 1.67 MG/DL — HIGH (ref 0.5–1.3)
EOSINOPHIL # BLD AUTO: 0 K/UL — SIGNIFICANT CHANGE UP (ref 0–0.5)
EOSINOPHIL NFR BLD AUTO: 0 % — SIGNIFICANT CHANGE UP (ref 0–6)
GAS PNL BLDV: 135 MMOL/L — LOW (ref 136–146)
GLUCOSE BLDV-MCNC: 129 MG/DL — HIGH (ref 70–99)
GLUCOSE SERPL-MCNC: 108 MG/DL — HIGH (ref 70–99)
HBA1C BLD-MCNC: 5.6 % — SIGNIFICANT CHANGE UP (ref 4–5.6)
HCO3 BLDV-SCNC: 23 MMOL/L — SIGNIFICANT CHANGE UP (ref 20–27)
HCT VFR BLD CALC: 40.6 % — SIGNIFICANT CHANGE UP (ref 39–50)
HCT VFR BLD CALC: 41.7 % — SIGNIFICANT CHANGE UP (ref 39–50)
HCT VFR BLDV CALC: 39.5 % — SIGNIFICANT CHANGE UP (ref 39–51)
HDLC SERPL-MCNC: 47 MG/DL — SIGNIFICANT CHANGE UP (ref 35–55)
HGB BLD-MCNC: 13.3 G/DL — SIGNIFICANT CHANGE UP (ref 13–17)
HGB BLD-MCNC: 13.3 G/DL — SIGNIFICANT CHANGE UP (ref 13–17)
HGB BLDV-MCNC: 12.8 G/DL — LOW (ref 13–17)
IMM GRANULOCYTES NFR BLD AUTO: 0.4 % — SIGNIFICANT CHANGE UP (ref 0–1.5)
INR BLD: 1.1 — SIGNIFICANT CHANGE UP (ref 0.88–1.17)
LACTATE BLDV-MCNC: 3.5 MMOL/L — HIGH (ref 0.5–2)
LIPID PNL WITH DIRECT LDL SERPL: 54 MG/DL — SIGNIFICANT CHANGE UP
LYMPHOCYTES # BLD AUTO: 1.23 K/UL — SIGNIFICANT CHANGE UP (ref 1–3.3)
LYMPHOCYTES # BLD AUTO: 11.5 % — LOW (ref 13–44)
MAGNESIUM SERPL-MCNC: 1.8 MG/DL — SIGNIFICANT CHANGE UP (ref 1.6–2.6)
MCHC RBC-ENTMCNC: 28.1 PG — SIGNIFICANT CHANGE UP (ref 27–34)
MCHC RBC-ENTMCNC: 28.1 PG — SIGNIFICANT CHANGE UP (ref 27–34)
MCHC RBC-ENTMCNC: 31.9 % — LOW (ref 32–36)
MCHC RBC-ENTMCNC: 32.8 % — SIGNIFICANT CHANGE UP (ref 32–36)
MCV RBC AUTO: 85.7 FL — SIGNIFICANT CHANGE UP (ref 80–100)
MCV RBC AUTO: 88 FL — SIGNIFICANT CHANGE UP (ref 80–100)
MONOCYTES # BLD AUTO: 0.62 K/UL — SIGNIFICANT CHANGE UP (ref 0–0.9)
MONOCYTES NFR BLD AUTO: 5.8 % — SIGNIFICANT CHANGE UP (ref 2–14)
NEUTROPHILS # BLD AUTO: 8.82 K/UL — HIGH (ref 1.8–7.4)
NEUTROPHILS NFR BLD AUTO: 82.2 % — HIGH (ref 43–77)
NRBC # FLD: 0 K/UL — SIGNIFICANT CHANGE UP (ref 0–0)
NRBC # FLD: 0 K/UL — SIGNIFICANT CHANGE UP (ref 0–0)
NT-PROBNP SERPL-SCNC: 1477 PG/ML — SIGNIFICANT CHANGE UP
PCO2 BLDV: 35 MMHG — LOW (ref 41–51)
PH BLDV: 7.41 PH — SIGNIFICANT CHANGE UP (ref 7.32–7.43)
PHOSPHATE SERPL-MCNC: 2.5 MG/DL — SIGNIFICANT CHANGE UP (ref 2.5–4.5)
PLATELET # BLD AUTO: 227 K/UL — SIGNIFICANT CHANGE UP (ref 150–400)
PLATELET # BLD AUTO: 254 K/UL — SIGNIFICANT CHANGE UP (ref 150–400)
PMV BLD: 10.3 FL — SIGNIFICANT CHANGE UP (ref 7–13)
PMV BLD: 10.3 FL — SIGNIFICANT CHANGE UP (ref 7–13)
PO2 BLDV: 72 MMHG — HIGH (ref 35–40)
POTASSIUM BLDV-SCNC: 3.7 MMOL/L — SIGNIFICANT CHANGE UP (ref 3.4–4.5)
POTASSIUM SERPL-MCNC: 4.3 MMOL/L — SIGNIFICANT CHANGE UP (ref 3.5–5.3)
POTASSIUM SERPL-SCNC: 4.3 MMOL/L — SIGNIFICANT CHANGE UP (ref 3.5–5.3)
PROTHROM AB SERPL-ACNC: 12.7 SEC — SIGNIFICANT CHANGE UP (ref 9.8–13.1)
RBC # BLD: 4.74 M/UL — SIGNIFICANT CHANGE UP (ref 4.2–5.8)
RBC # BLD: 4.74 M/UL — SIGNIFICANT CHANGE UP (ref 4.2–5.8)
RBC # FLD: 13.8 % — SIGNIFICANT CHANGE UP (ref 10.3–14.5)
RBC # FLD: 13.9 % — SIGNIFICANT CHANGE UP (ref 10.3–14.5)
SAO2 % BLDV: 94.1 % — HIGH (ref 60–85)
SODIUM SERPL-SCNC: 134 MMOL/L — LOW (ref 135–145)
TRIGL SERPL-MCNC: 46 MG/DL — SIGNIFICANT CHANGE UP (ref 10–149)
TSH SERPL-MCNC: 0.33 UIU/ML — SIGNIFICANT CHANGE UP (ref 0.27–4.2)
WBC # BLD: 10.72 K/UL — HIGH (ref 3.8–10.5)
WBC # BLD: 11.91 K/UL — HIGH (ref 3.8–10.5)
WBC # FLD AUTO: 10.72 K/UL — HIGH (ref 3.8–10.5)
WBC # FLD AUTO: 11.91 K/UL — HIGH (ref 3.8–10.5)

## 2020-02-29 RX ORDER — MAGNESIUM OXIDE 400 MG ORAL TABLET 241.3 MG
400 TABLET ORAL ONCE
Refills: 0 | Status: COMPLETED | OUTPATIENT
Start: 2020-02-29 | End: 2020-02-29

## 2020-02-29 RX ADMIN — HEPARIN SODIUM 5000 UNIT(S): 5000 INJECTION INTRAVENOUS; SUBCUTANEOUS at 13:13

## 2020-02-29 RX ADMIN — HEPARIN SODIUM 5000 UNIT(S): 5000 INJECTION INTRAVENOUS; SUBCUTANEOUS at 22:03

## 2020-02-29 RX ADMIN — CARVEDILOL PHOSPHATE 3.12 MILLIGRAM(S): 80 CAPSULE, EXTENDED RELEASE ORAL at 17:08

## 2020-02-29 RX ADMIN — MAGNESIUM OXIDE 400 MG ORAL TABLET 400 MILLIGRAM(S): 241.3 TABLET ORAL at 13:12

## 2020-02-29 RX ADMIN — Medication 81 MILLIGRAM(S): at 13:13

## 2020-02-29 RX ADMIN — Medication 20 MILLIGRAM(S): at 17:08

## 2020-02-29 RX ADMIN — CARVEDILOL PHOSPHATE 3.12 MILLIGRAM(S): 80 CAPSULE, EXTENDED RELEASE ORAL at 05:23

## 2020-02-29 RX ADMIN — Medication 20 MILLIGRAM(S): at 05:22

## 2020-02-29 RX ADMIN — HEPARIN SODIUM 5000 UNIT(S): 5000 INJECTION INTRAVENOUS; SUBCUTANEOUS at 05:22

## 2020-02-29 NOTE — DIETITIAN INITIAL EVALUATION ADULT. - PROBLEM SELECTOR PLAN 1
Admitted to telemetry monitor  Rule out ACS, trend cardiac enzymes (last troponin 23)  TTE  Consider ischemic eval  BNP:719, CXR: Clear lungs with no signs of pleural effusions or pneumothorax  probnp, daily wts, strict I & O 's , fluid restriction, pt c/s  started on lasix 20 iv bid

## 2020-02-29 NOTE — DIETITIAN INITIAL EVALUATION ADULT. - PERTINENT MEDS FT
MEDICATIONS  (STANDING):  aspirin enteric coated 81 milliGRAM(s) Oral daily  carvedilol 3.125 milliGRAM(s) Oral every 12 hours  furosemide   Injectable 20 milliGRAM(s) IV Push two times a day  heparin  Injectable 5000 Unit(s) SubCutaneous every 8 hours  influenza   Vaccine 0.5 milliLiter(s) IntraMuscular once  magnesium oxide 400 milliGRAM(s) Oral once    MEDICATIONS  (PRN):  acetaminophen   Tablet .. 650 milliGRAM(s) Oral every 6 hours PRN Temp greater or equal to 38C (100.4F), Mild Pain (1 - 3), Moderate Pain (4 - 6)

## 2020-02-29 NOTE — PHYSICAL THERAPY INITIAL EVALUATION ADULT - PERTINENT HX OF CURRENT PROBLEM, REHAB EVAL
Pt. is a 69 year old male admitted to [facility] with heart failure. PMH: CKD,HTN.

## 2020-02-29 NOTE — DIETITIAN INITIAL EVALUATION ADULT. - PERTINENT LABORATORY DATA
02-29 Na 134 mmol/L<L> Glu 108 mg/dL<H> K+ 4.3 mmol/L Cr 1.67 mg/dL<H> BUN 23 mg/dL Phos 2.5 mg/dL  02-29-20 HbA1c 5.6 %

## 2020-02-29 NOTE — DIETITIAN INITIAL EVALUATION ADULT. - OTHER INFO
69-year-old male patient with PMH of CKD and HTN. Presented to hospital with new diagnosis of heart failure. Request for nutrition consult was generated on 2/28.    RD met with patient during time of encounter. Patient denies food allergies, nausea/vomiting/diarrhea/constipation, or issues with chewing/swallowing, reporting normal BM this AM. Patient also reports excellent appetite and > 75% PO intake. Patient had chicken and potatoes yesterday and eggs with milk and break for breakfast. Patient confirms current weight of 72 kg (2/29), which is reflective of reported usual body weight of 72.7 kg. Educated patient on heart healthy, low sodium diet with fluid restriction < 1500 mL. Patient to continue with fluid restriction given low Na 134 mmol/L<L>.     RD services to remain available.

## 2020-02-29 NOTE — DIETITIAN INITIAL EVALUATION ADULT. - DIET TYPE
Diet, Regular: DASH/TLC {Sodium & Cholesterol Restricted} (DASH)  Low Fat (LOWFAT) 1500mL Fluid Restriction ([id number])   Low Sodium Special Instructions for Nursing:  Low Sodium (02-28-20 @ 15:23)

## 2020-02-29 NOTE — PHYSICAL THERAPY INITIAL EVALUATION ADULT - REHAB POTENTIAL, PT EVAL
Pt. not placed on skilled therapy services secondary to pt. performing at their baseline functional mobility performance. anticipate discharge to home with no skilled PT services.

## 2020-03-01 LAB
ANION GAP SERPL CALC-SCNC: 13 MMO/L — SIGNIFICANT CHANGE UP (ref 7–14)
BUN SERPL-MCNC: 25 MG/DL — HIGH (ref 7–23)
CALCIUM SERPL-MCNC: 8.9 MG/DL — SIGNIFICANT CHANGE UP (ref 8.4–10.5)
CHLORIDE SERPL-SCNC: 103 MMOL/L — SIGNIFICANT CHANGE UP (ref 98–107)
CO2 SERPL-SCNC: 21 MMOL/L — LOW (ref 22–31)
CREAT SERPL-MCNC: 1.69 MG/DL — HIGH (ref 0.5–1.3)
GLUCOSE SERPL-MCNC: 106 MG/DL — HIGH (ref 70–99)
HCT VFR BLD CALC: 43.9 % — SIGNIFICANT CHANGE UP (ref 39–50)
HCV AB S/CO SERPL IA: 0.43 S/CO — SIGNIFICANT CHANGE UP (ref 0–0.99)
HCV AB SERPL-IMP: SIGNIFICANT CHANGE UP
HGB BLD-MCNC: 14.3 G/DL — SIGNIFICANT CHANGE UP (ref 13–17)
LACTATE SERPL-SCNC: 1.4 MMOL/L — SIGNIFICANT CHANGE UP (ref 0.5–2)
MAGNESIUM SERPL-MCNC: 2.1 MG/DL — SIGNIFICANT CHANGE UP (ref 1.6–2.6)
MCHC RBC-ENTMCNC: 28.1 PG — SIGNIFICANT CHANGE UP (ref 27–34)
MCHC RBC-ENTMCNC: 32.6 % — SIGNIFICANT CHANGE UP (ref 32–36)
MCV RBC AUTO: 86.4 FL — SIGNIFICANT CHANGE UP (ref 80–100)
NRBC # FLD: 0 K/UL — SIGNIFICANT CHANGE UP (ref 0–0)
NT-PROBNP SERPL-SCNC: 482.6 PG/ML — SIGNIFICANT CHANGE UP
PHOSPHATE SERPL-MCNC: 2.6 MG/DL — SIGNIFICANT CHANGE UP (ref 2.5–4.5)
PLATELET # BLD AUTO: 244 K/UL — SIGNIFICANT CHANGE UP (ref 150–400)
PMV BLD: 9.9 FL — SIGNIFICANT CHANGE UP (ref 7–13)
POTASSIUM SERPL-MCNC: 3.8 MMOL/L — SIGNIFICANT CHANGE UP (ref 3.5–5.3)
POTASSIUM SERPL-SCNC: 3.8 MMOL/L — SIGNIFICANT CHANGE UP (ref 3.5–5.3)
RBC # BLD: 5.08 M/UL — SIGNIFICANT CHANGE UP (ref 4.2–5.8)
RBC # FLD: 13.9 % — SIGNIFICANT CHANGE UP (ref 10.3–14.5)
SODIUM SERPL-SCNC: 137 MMOL/L — SIGNIFICANT CHANGE UP (ref 135–145)
WBC # BLD: 5.87 K/UL — SIGNIFICANT CHANGE UP (ref 3.8–10.5)
WBC # FLD AUTO: 5.87 K/UL — SIGNIFICANT CHANGE UP (ref 3.8–10.5)

## 2020-03-01 PROCEDURE — 93306 TTE W/DOPPLER COMPLETE: CPT | Mod: 26

## 2020-03-01 RX ADMIN — HEPARIN SODIUM 5000 UNIT(S): 5000 INJECTION INTRAVENOUS; SUBCUTANEOUS at 05:12

## 2020-03-01 RX ADMIN — HEPARIN SODIUM 5000 UNIT(S): 5000 INJECTION INTRAVENOUS; SUBCUTANEOUS at 21:55

## 2020-03-01 RX ADMIN — Medication 20 MILLIGRAM(S): at 17:56

## 2020-03-01 RX ADMIN — Medication 20 MILLIGRAM(S): at 05:12

## 2020-03-01 RX ADMIN — CARVEDILOL PHOSPHATE 3.12 MILLIGRAM(S): 80 CAPSULE, EXTENDED RELEASE ORAL at 05:12

## 2020-03-01 RX ADMIN — Medication 81 MILLIGRAM(S): at 11:31

## 2020-03-01 RX ADMIN — HEPARIN SODIUM 5000 UNIT(S): 5000 INJECTION INTRAVENOUS; SUBCUTANEOUS at 13:33

## 2020-03-01 RX ADMIN — CARVEDILOL PHOSPHATE 3.12 MILLIGRAM(S): 80 CAPSULE, EXTENDED RELEASE ORAL at 17:57

## 2020-03-02 LAB
ANION GAP SERPL CALC-SCNC: 12 MMO/L — SIGNIFICANT CHANGE UP (ref 7–14)
ANION GAP SERPL CALC-SCNC: 14 MMO/L — SIGNIFICANT CHANGE UP (ref 7–14)
BUN SERPL-MCNC: 24 MG/DL — HIGH (ref 7–23)
BUN SERPL-MCNC: 25 MG/DL — HIGH (ref 7–23)
CALCIUM SERPL-MCNC: 9 MG/DL — SIGNIFICANT CHANGE UP (ref 8.4–10.5)
CALCIUM SERPL-MCNC: 9.1 MG/DL — SIGNIFICANT CHANGE UP (ref 8.4–10.5)
CHLORIDE SERPL-SCNC: 102 MMOL/L — SIGNIFICANT CHANGE UP (ref 98–107)
CHLORIDE SERPL-SCNC: 102 MMOL/L — SIGNIFICANT CHANGE UP (ref 98–107)
CO2 SERPL-SCNC: 18 MMOL/L — LOW (ref 22–31)
CO2 SERPL-SCNC: 22 MMOL/L — SIGNIFICANT CHANGE UP (ref 22–31)
CREAT SERPL-MCNC: 1.77 MG/DL — HIGH (ref 0.5–1.3)
CREAT SERPL-MCNC: 1.81 MG/DL — HIGH (ref 0.5–1.3)
GLUCOSE SERPL-MCNC: 123 MG/DL — HIGH (ref 70–99)
GLUCOSE SERPL-MCNC: 142 MG/DL — HIGH (ref 70–99)
HCT VFR BLD CALC: 48.5 % — SIGNIFICANT CHANGE UP (ref 39–50)
HGB BLD-MCNC: 15.3 G/DL — SIGNIFICANT CHANGE UP (ref 13–17)
MAGNESIUM SERPL-MCNC: 2.1 MG/DL — SIGNIFICANT CHANGE UP (ref 1.6–2.6)
MCHC RBC-ENTMCNC: 27.7 PG — SIGNIFICANT CHANGE UP (ref 27–34)
MCHC RBC-ENTMCNC: 31.5 % — LOW (ref 32–36)
MCV RBC AUTO: 87.7 FL — SIGNIFICANT CHANGE UP (ref 80–100)
NRBC # FLD: 0 K/UL — SIGNIFICANT CHANGE UP (ref 0–0)
PHOSPHATE SERPL-MCNC: 3.3 MG/DL — SIGNIFICANT CHANGE UP (ref 2.5–4.5)
PLATELET # BLD AUTO: 282 K/UL — SIGNIFICANT CHANGE UP (ref 150–400)
PMV BLD: 10.3 FL — SIGNIFICANT CHANGE UP (ref 7–13)
POTASSIUM SERPL-MCNC: 3.9 MMOL/L — SIGNIFICANT CHANGE UP (ref 3.5–5.3)
POTASSIUM SERPL-MCNC: 4.1 MMOL/L — SIGNIFICANT CHANGE UP (ref 3.5–5.3)
POTASSIUM SERPL-MCNC: 4.3 MMOL/L — SIGNIFICANT CHANGE UP (ref 3.5–5.3)
POTASSIUM SERPL-MCNC: 4.3 MMOL/L — SIGNIFICANT CHANGE UP (ref 3.5–5.3)
POTASSIUM SERPL-SCNC: 3.9 MMOL/L — SIGNIFICANT CHANGE UP (ref 3.5–5.3)
POTASSIUM SERPL-SCNC: 4.1 MMOL/L — SIGNIFICANT CHANGE UP (ref 3.5–5.3)
POTASSIUM SERPL-SCNC: 4.3 MMOL/L — SIGNIFICANT CHANGE UP (ref 3.5–5.3)
POTASSIUM SERPL-SCNC: 4.3 MMOL/L — SIGNIFICANT CHANGE UP (ref 3.5–5.3)
RBC # BLD: 5.53 M/UL — SIGNIFICANT CHANGE UP (ref 4.2–5.8)
RBC # FLD: 13.9 % — SIGNIFICANT CHANGE UP (ref 10.3–14.5)
SODIUM SERPL-SCNC: 134 MMOL/L — LOW (ref 135–145)
SODIUM SERPL-SCNC: 136 MMOL/L — SIGNIFICANT CHANGE UP (ref 135–145)
WBC # BLD: 4.81 K/UL — SIGNIFICANT CHANGE UP (ref 3.8–10.5)
WBC # FLD AUTO: 4.81 K/UL — SIGNIFICANT CHANGE UP (ref 3.8–10.5)

## 2020-03-02 PROCEDURE — 76376 3D RENDER W/INTRP POSTPROCES: CPT | Mod: 26

## 2020-03-02 PROCEDURE — 93320 DOPPLER ECHO COMPLETE: CPT | Mod: 26,GC

## 2020-03-02 PROCEDURE — 93325 DOPPLER ECHO COLOR FLOW MAPG: CPT | Mod: 26,GC

## 2020-03-02 PROCEDURE — 93312 ECHO TRANSESOPHAGEAL: CPT | Mod: 26

## 2020-03-02 RX ADMIN — Medication 20 MILLIGRAM(S): at 18:38

## 2020-03-02 RX ADMIN — CARVEDILOL PHOSPHATE 3.12 MILLIGRAM(S): 80 CAPSULE, EXTENDED RELEASE ORAL at 06:31

## 2020-03-02 RX ADMIN — CARVEDILOL PHOSPHATE 3.12 MILLIGRAM(S): 80 CAPSULE, EXTENDED RELEASE ORAL at 18:39

## 2020-03-02 RX ADMIN — HEPARIN SODIUM 5000 UNIT(S): 5000 INJECTION INTRAVENOUS; SUBCUTANEOUS at 21:47

## 2020-03-02 RX ADMIN — Medication 20 MILLIGRAM(S): at 06:31

## 2020-03-02 RX ADMIN — Medication 81 MILLIGRAM(S): at 11:32

## 2020-03-02 NOTE — CHART NOTE - NSCHARTNOTEFT_GEN_A_CORE
Brief procedural note:  Transesophageal echocardiogram (FLORINDA)    FLORINDA performed with Anesthesia to evaluate mitral regurgitation.    Brief summary of findings:  Severe global LV systolic dysfunction; normal mitral valve with tethered leaflets; moderate mitral regurgitation; no intracardiac thrombus; no patent foramen ovale.    Complications:  None    Estimated blood loss:  Zero    Full report to follow.

## 2020-03-03 LAB
ANION GAP SERPL CALC-SCNC: 14 MMO/L — SIGNIFICANT CHANGE UP (ref 7–14)
BUN SERPL-MCNC: 31 MG/DL — HIGH (ref 7–23)
CALCIUM SERPL-MCNC: 9.1 MG/DL — SIGNIFICANT CHANGE UP (ref 8.4–10.5)
CHLORIDE SERPL-SCNC: 102 MMOL/L — SIGNIFICANT CHANGE UP (ref 98–107)
CO2 SERPL-SCNC: 21 MMOL/L — LOW (ref 22–31)
CREAT SERPL-MCNC: 1.92 MG/DL — HIGH (ref 0.5–1.3)
GLUCOSE SERPL-MCNC: 120 MG/DL — HIGH (ref 70–99)
POTASSIUM SERPL-MCNC: 4 MMOL/L — SIGNIFICANT CHANGE UP (ref 3.5–5.3)
POTASSIUM SERPL-SCNC: 4 MMOL/L — SIGNIFICANT CHANGE UP (ref 3.5–5.3)
SODIUM SERPL-SCNC: 137 MMOL/L — SIGNIFICANT CHANGE UP (ref 135–145)

## 2020-03-03 PROCEDURE — 99252 IP/OBS CONSLTJ NEW/EST SF 35: CPT | Mod: 25

## 2020-03-03 RX ADMIN — Medication 20 MILLIGRAM(S): at 17:50

## 2020-03-03 RX ADMIN — CARVEDILOL PHOSPHATE 3.12 MILLIGRAM(S): 80 CAPSULE, EXTENDED RELEASE ORAL at 06:39

## 2020-03-03 RX ADMIN — Medication 20 MILLIGRAM(S): at 06:39

## 2020-03-03 RX ADMIN — HEPARIN SODIUM 5000 UNIT(S): 5000 INJECTION INTRAVENOUS; SUBCUTANEOUS at 21:37

## 2020-03-03 RX ADMIN — CARVEDILOL PHOSPHATE 3.12 MILLIGRAM(S): 80 CAPSULE, EXTENDED RELEASE ORAL at 17:49

## 2020-03-03 RX ADMIN — Medication 81 MILLIGRAM(S): at 11:59

## 2020-03-03 RX ADMIN — HEPARIN SODIUM 5000 UNIT(S): 5000 INJECTION INTRAVENOUS; SUBCUTANEOUS at 06:39

## 2020-03-03 NOTE — CONSULT NOTE ADULT - ATTENDING COMMENTS
Risk and benefits of RHC/LHC explained to the patient and written consent obtained.   Further management as per cath findings.

## 2020-03-03 NOTE — CONSULT NOTE ADULT - SUBJECTIVE AND OBJECTIVE BOX
68 y/o Male, with a PmHx of CKD stage III and untreated HTN, presents with shortness of breath. Found to have new diagnosis of ADHF    PMH:   Chronic kidney disease (CKD)  HTN (hypertension)    PSH:   No significant past surgical history    Medications:   acetaminophen   Tablet .. 650 milliGRAM(s) Oral every 6 hours PRN  aspirin enteric coated 81 milliGRAM(s) Oral daily  carvedilol 3.125 milliGRAM(s) Oral every 12 hours  furosemide   Injectable 20 milliGRAM(s) IV Push two times a day  heparin  Injectable 5000 Unit(s) SubCutaneous every 8 hours  influenza   Vaccine 0.5 milliLiter(s) IntraMuscular once    Allergies:  No Known Allergies    FAMILY HISTORY:  No pertinent family history in first degree relatives    Social History:  Smoking:  Alcohol:  Drugs:    Review of Systems:  Constitutional: [ ] Fever [ ] Chills [ ] Fatigue [ ] Weight change   HEENT: [ ] Blurred vision [ ] Eye Pain [ ] Headache [ ] Runny nose [ ] Sore Throat   Respiratory: [ ] Cough [ ] Wheezing [ ] Shortness of breath  Cardiovascular: [ ] Chest Pain [ ] Palpitations [ ] BARROW [ ] PND [ ] Orthopnea  Gastrointestinal: [ ] Abdominal Pain [ ] Diarrhea [ ] Constipation [ ] Hemorrhoids [ ] Nausea [ ] Vomiting  Genitourinary: [ ] Nocturia [ ] Dysuria [ ] Incontinence  Extremities: [ ] Swelling [ ] Joint Pain  Neurologic: [ ] Focal deficit [ ] Paresthesias [ ] Syncope  Lymphatic: [ ] Swelling [ ] Lymphadenopathy   Skin: [ ] Rash [ ] Ecchymoses [ ] Wounds [ ] Lesions  Psychiatry: [ ] Depression [ ] Suicidal/Homicidal Ideation [ ] Anxiety [ ] Sleep Disturbances  [ ] 10 point review of systems is otherwise negative except as mentioned above            [ ]Unable to obtain    Physical Exam:  T(C): 36.8 (03-03-20 @ 09:55), Max: 36.8 (03-03-20 @ 09:55)  HR: 71 (03-03-20 @ 09:55) (69 - 72)  BP: 113/76 (03-03-20 @ 09:55) (104/68 - 119/80)  RR: 17 (03-03-20 @ 09:55) (16 - 19)  SpO2: 98% (03-03-20 @ 09:55) (97% - 98%)  Wt(kg): --    03-02 @ 07:01  -  03-03 @ 07:00  --------------------------------------------------------  IN: 190 mL / OUT: 1370 mL / NET: -1180 mL    03-03 @ 07:01  -  03-03 @ 17:34  --------------------------------------------------------  IN: 100 mL / OUT: 300 mL / NET: -200 mL      Daily     Daily     Appearance: [ ] Normal [ ] NAD  Eyes: [ ] PERRL [ ] EOMI  HENT: [ ] Normal oral muscosa [ ]NC/AT  Cardiovascular: [ ] S1 [ ] S2 [ ] RRR [ ] No m/r/g [ ]No edema [ ] JVP  Procedural Access Site: [ ] No hematoma [ ] Non-tender to palpation [ ] 2+ pulse [ ] No bruit [ ] No Ecchymosis  Respiratory: [ ] Clear to auscultation bilaterally  Gastrointestinal: [ ] Soft [ ] Non-tender [ ] Non-distended [ ] BS+  Musculoskeletal: [ ] No clubbing [ ] No joint deformity   Neurologic: [ ] Non-focal  Lymphatic: [ ] No lymphadenopathy  Psychiatry: [ ] AAOx3 [ ] Mood & affect appropriate  Skin: [ ] No rashes [ ] No ecchymoses [ ] No cyanosis    Labs:                        15.3   4.81  )-----------( 282      ( 02 Mar 2020 06:45 )             48.5     03-02    134<L>  |  102  |  25<H>  ----------------------------<  142<H>  4.3   |  18<L>  |  1.81<H>    Ca    9.0      02 Mar 2020 19:19  Phos  3.3     03-02  Mg     2.1     03-02            Serum Pro-Brain Natriuretic Peptide: 482.6 pg/mL (03-01 @ 06:48)  Serum Pro-Brain Natriuretic Peptide: 1477 pg/mL (02-29 @ 05:30)  Serum Pro-Brain Natriuretic Peptide: 719.0 pg/mL (02-28 @ 13:16)      Hemoglobin A1C, Whole Blood: 5.6 % (02-29 @ 05:30)    Thyroid Stimulating Hormone, Serum: 0.33 uIU/mL (02-29 @ 05:30)

## 2020-03-03 NOTE — PROVIDER CONTACT NOTE (OTHER) - ASSESSMENT
Patient resting in bed comfortably, on his cell phone  Denies chest pain, shortness of breath, dizziness

## 2020-03-04 ENCOUNTER — TRANSCRIPTION ENCOUNTER (OUTPATIENT)
Age: 70
End: 2020-03-04

## 2020-03-04 VITALS
TEMPERATURE: 97 F | HEART RATE: 79 BPM | OXYGEN SATURATION: 100 % | DIASTOLIC BLOOD PRESSURE: 50 MMHG | SYSTOLIC BLOOD PRESSURE: 110 MMHG | RESPIRATION RATE: 17 BRPM

## 2020-03-04 LAB
ANION GAP SERPL CALC-SCNC: 13 MMO/L — SIGNIFICANT CHANGE UP (ref 7–14)
BUN SERPL-MCNC: 31 MG/DL — HIGH (ref 7–23)
CALCIUM SERPL-MCNC: 9.1 MG/DL — SIGNIFICANT CHANGE UP (ref 8.4–10.5)
CHLORIDE SERPL-SCNC: 101 MMOL/L — SIGNIFICANT CHANGE UP (ref 98–107)
CO2 SERPL-SCNC: 21 MMOL/L — LOW (ref 22–31)
CREAT SERPL-MCNC: 1.82 MG/DL — HIGH (ref 0.5–1.3)
GLUCOSE SERPL-MCNC: 97 MG/DL — SIGNIFICANT CHANGE UP (ref 70–99)
MAGNESIUM SERPL-MCNC: 2.2 MG/DL — SIGNIFICANT CHANGE UP (ref 1.6–2.6)
POTASSIUM SERPL-MCNC: 4.3 MMOL/L — SIGNIFICANT CHANGE UP (ref 3.5–5.3)
POTASSIUM SERPL-SCNC: 4.3 MMOL/L — SIGNIFICANT CHANGE UP (ref 3.5–5.3)
SODIUM SERPL-SCNC: 135 MMOL/L — SIGNIFICANT CHANGE UP (ref 135–145)

## 2020-03-04 RX ORDER — CARVEDILOL PHOSPHATE 80 MG/1
1 CAPSULE, EXTENDED RELEASE ORAL
Qty: 60 | Refills: 0
Start: 2020-03-04 | End: 2020-04-02

## 2020-03-04 RX ORDER — ASPIRIN/CALCIUM CARB/MAGNESIUM 324 MG
1 TABLET ORAL
Qty: 30 | Refills: 0
Start: 2020-03-04 | End: 2020-04-02

## 2020-03-04 RX ORDER — LANOLIN ALCOHOL/MO/W.PET/CERES
3 CREAM (GRAM) TOPICAL AT BEDTIME
Refills: 0 | Status: DISCONTINUED | OUTPATIENT
Start: 2020-03-04 | End: 2020-03-02

## 2020-03-04 RX ORDER — FUROSEMIDE 40 MG
1 TABLET ORAL
Qty: 30 | Refills: 0
Start: 2020-03-04 | End: 2020-04-02

## 2020-03-04 RX ADMIN — Medication 20 MILLIGRAM(S): at 06:07

## 2020-03-04 RX ADMIN — HEPARIN SODIUM 5000 UNIT(S): 5000 INJECTION INTRAVENOUS; SUBCUTANEOUS at 06:04

## 2020-03-04 RX ADMIN — Medication 81 MILLIGRAM(S): at 12:58

## 2020-03-04 RX ADMIN — CARVEDILOL PHOSPHATE 3.12 MILLIGRAM(S): 80 CAPSULE, EXTENDED RELEASE ORAL at 06:04

## 2020-03-04 RX ADMIN — HEPARIN SODIUM 5000 UNIT(S): 5000 INJECTION INTRAVENOUS; SUBCUTANEOUS at 13:45

## 2020-03-04 RX ADMIN — INFLUENZA VIRUS VACCINE 0.5 MILLILITER(S): 15; 15; 15; 15 SUSPENSION INTRAMUSCULAR at 13:42

## 2020-03-04 NOTE — DISCHARGE NOTE PROVIDER - NSDCFUADDAPPT_GEN_ALL_CORE_FT
Follow up with your PCP/Cardiologist upon discharge. You have an appointment at [facility] ([address] [place] [address]) on 3/23/2020 at 1pm with Dr. Barrett.  You will have to see the financial counselor at 12pm on the same day. Please bring ID, proof of address and proof of income. The financial counselor is located in the [facility] on the 1st Floor - Registration. Your appointment with Dr. Barrett is in the [facility] in room P020.

## 2020-03-04 NOTE — PHARMACOTHERAPY INTERVENTION NOTE - COMMENTS
Discussed medication indication, dose, administration, side effects, and monitoring. Reviewed daily limits on salt and fluid intake as well as daily weight monitoring.  Understanding verbalized by patient.

## 2020-03-04 NOTE — DISCHARGE NOTE PROVIDER - NSDCMRMEDTOKEN_GEN_ALL_CORE_FT
aspirin 81 mg oral delayed release tablet: 1 tab(s) orally once a day  carvedilol 3.125 mg oral tablet: 1 tab(s) orally every 12 hours  Lasix 40 mg oral tablet: 1 tab(s) orally once a day

## 2020-03-04 NOTE — DISCHARGE NOTE PROVIDER - CARE PROVIDER_API CALL
FOLLOW UP WITH DR. ERNANDEZ AT [facility] ON 3/23 AT 1PM,   Phone: (   )    -  Fax: (   )    -  Follow Up Time:

## 2020-03-04 NOTE — CHART NOTE - NSCHARTNOTEFT_GEN_A_CORE
Patient s/p cardiac cath, Rt wrist And Right Brachial sites appear clean, dry, intact, no evidence of active bleeding, no hematoma, + pulses, good capillary refill. Continue to monitor.

## 2020-03-04 NOTE — DISCHARGE NOTE NURSING/CASE MANAGEMENT/SOCIAL WORK - PATIENT PORTAL LINK FT
You can access the FollowMyHealth Patient Portal offered by [facility] by registering at the following website: [email or web address]. By joining [facility]’s FollowMyHealth portal, you will also be able to view your health information using other applications (apps) compatible with our system.

## 2020-03-04 NOTE — DISCHARGE NOTE PROVIDER - PROVIDER TOKENS
FREE:[LAST:[FOLLOW UP WITH DR. ERNANDEZ AT [facility] ON 3/23 AT 1PM],PHONE:[(   )    -],FAX:[(   )    -]]

## 2020-03-04 NOTE — DISCHARGE NOTE PROVIDER - NSDCCPCAREPLAN_GEN_ALL_CORE_FT
PRINCIPAL DISCHARGE DIAGNOSIS  Diagnosis: CHF (congestive heart failure)  Assessment and Plan of Treatment: You were admitted for shortness of breath and underwent an ehco which showed concern for severe valve disease and low heart pump function. You were given Lasix in order to get some fluid off of you. You will continue this medication at home. You also underwent a transesophageal echocardiogram which showed only moderate valve disease. You also underwent a right and left heart catherization. Low salt diet, fluid restriction to 1500 ml daily, monitor your fluid intake and weight daily, exercise as tolerated 30 minutes daily, and follow up with your physician within 7 days.

## 2020-03-04 NOTE — DISCHARGE NOTE NURSING/CASE MANAGEMENT/SOCIAL WORK - NSDCFUADDAPPT_GEN_ALL_CORE_FT
Follow up with your PCP/Cardiologist upon discharge. You have an appointment at [facility] ([phone] [place] [address]) on 3/23/2020 at 1pm with Dr. Barrett.  You will have to see the financial counselor at 12pm on the same day. Please bring ID, proof of address and proof of income. The financial counselor is located in the [facility] on the 1st Floor - Registration. Your appointment with Dr. Barrett is in the [facility] in room P020.

## 2020-03-04 NOTE — DISCHARGE NOTE PROVIDER - HOSPITAL COURSE
70 y/o male, with PMHx of CKD and HTN, who presented with SOB.         1. SOB: Patient underwent echo, which showed 1. Tethered mitral valve leaflets with normal opening. Severe mitral regurgitation. 2. Normal trileaflet aortic valve. Mild aortic regurgitation. 3. Severely dilated left atrium.  LA volume index = 52 cc/m2. 4. Severe global left ventricular systolic dysfunction.    5. Severe diastolic dysfunction (Stage III). 6. Normal right ventricular size and function. 7. Normal tricuspid valve. Moderate-severe tricuspid regurgitation.    He was admitted for new onset heart failure and diuresed with 20mg of Lasix twice a day, now transitioned to oral therapy. He was started on appropriate medical therapy and to be discharged on the same. Given finding of severe mitral regurgitation, decision was made to undergo FLORINDA. Found to have moderate MR, mild AR, severe global dysfunction and no thrombus of PFO on FLORINDA. He underwent left and right heart cath. LHC showed luminal disease and RHC via brachial access with CI 2.17 and CO 3.84. Sites stable.     Dispo as per PT: Home, no needs.         Patient has a green card but does not have insurance. Rx have been sent to [facility] where each Rx is only $10. Appointment has been scheduled for patient at [facility] for 3/23/2020. Unable to obtain sooner appointment, as patient is new patient.          Patient seen and evaluated, no acute somatic complaints. Reviewed discharge medications with patient; All new medications requiring new prescriptions were sent to the pharmacy of patient's choice. Reviewed need for prescription for previous home medications and new prescriptions sent if requested. Medically cleared/stable for discharge as per Dr. Man with close outpatient follow up within 1-2 weeks of discharge. Patient understands and agrees with plan of care.

## 2020-07-29 PROBLEM — N18.9 CHRONIC KIDNEY DISEASE, UNSPECIFIED: Chronic | Status: ACTIVE | Noted: 2020-02-28

## 2020-08-25 ENCOUNTER — APPOINTMENT (OUTPATIENT)
Dept: GASTROENTEROLOGY | Facility: CLINIC | Age: 70
End: 2020-08-25
Payer: MEDICAID

## 2020-08-25 VITALS
SYSTOLIC BLOOD PRESSURE: 130 MMHG | WEIGHT: 160 LBS | HEIGHT: 65 IN | TEMPERATURE: 98.6 F | OXYGEN SATURATION: 99 % | DIASTOLIC BLOOD PRESSURE: 70 MMHG | HEART RATE: 68 BPM | BODY MASS INDEX: 26.66 KG/M2

## 2020-08-25 DIAGNOSIS — Z83.3 FAMILY HISTORY OF DIABETES MELLITUS: ICD-10-CM

## 2020-08-25 DIAGNOSIS — Z86.79 PERSONAL HISTORY OF OTHER DISEASES OF THE CIRCULATORY SYSTEM: ICD-10-CM

## 2020-08-25 DIAGNOSIS — Z87.891 PERSONAL HISTORY OF NICOTINE DEPENDENCE: ICD-10-CM

## 2020-08-25 DIAGNOSIS — Z63.5 DISRUPTION OF FAMILY BY SEPARATION AND DIVORCE: ICD-10-CM

## 2020-08-25 DIAGNOSIS — R12 HEARTBURN: ICD-10-CM

## 2020-08-25 DIAGNOSIS — Z78.9 OTHER SPECIFIED HEALTH STATUS: ICD-10-CM

## 2020-08-25 DIAGNOSIS — Z82.49 FAMILY HISTORY OF ISCHEMIC HEART DISEASE AND OTHER DISEASES OF THE CIRCULATORY SYSTEM: ICD-10-CM

## 2020-08-25 PROCEDURE — 99204 OFFICE O/P NEW MOD 45 MIN: CPT

## 2020-08-25 SDOH — SOCIAL STABILITY - SOCIAL INSECURITY: DISRUPTION OF FAMILY BY SEPARATION AND DIVORCE: Z63.5

## 2020-08-25 NOTE — REVIEW OF SYSTEMS
[As Noted in HPI] : as noted in HPI [Abdominal Pain] : abdominal pain [Vomiting] : no vomiting [Diarrhea] : no diarrhea [Constipation] : no constipation [Heartburn] : heartburn [Melena] : no melena [Negative] : Genitourinary

## 2020-08-25 NOTE — HISTORY OF PRESENT ILLNESS
[Heartburn] : denies heartburn [Nausea] : denies nausea [Yellow Skin Or Eyes (Jaundice)] : denies jaundice [Vomiting] : denies vomiting [Rectal Pain] : denies rectal pain [Abdominal Swelling] : denies abdominal swelling [Abdominal Pain] : abdominal pain [Constipation] : constipation [Diarrhea] : diarrhea [Kidney Stone] : kidney stone [Wt Gain ___ Lbs] : no recent weight gain [Wt Loss ___ Lbs] : no recent weight loss [GERD] : no gastroesophageal reflux disease [Hiatus Hernia] : no hiatus hernia [Peptic Ulcer Disease] : no peptic ulcer disease [Pancreatitis] : no pancreatitis [Cholelithiasis] : no cholelithiasis [Inflammatory Bowel Disease] : no inflammatory bowel disease [Irritable Bowel Syndrome] : no irritable bowel syndrome [Diverticulitis] : no diverticulitis [Alcohol Abuse] : no alcohol abuse [Malignancy] : no malignancy [Abdominal Surgery] : no abdominal surgery [Appendectomy] : no appendectomy [Cholecystectomy] : no cholecystectomy [de-identified] : 70 year old man referred for a screening colonoscopy. Patient is poor historian. He has a history of congestive meredith failure for which he was hospitalized at [facility]. He is supposed to be undergoing cardiac evaluation. He also complains of abdominal pain nd heartburn.

## 2020-08-25 NOTE — PHYSICAL EXAM
[General Appearance - Alert] : alert [General Appearance - In No Acute Distress] : in no acute distress [Sclera] : the sclera and conjunctiva were normal [PERRL With Normal Accommodation] : pupils were equal in size, round, and reactive to light [Outer Ear] : the ears and nose were normal in appearance [Extraocular Movements] : extraocular movements were intact [Neck Appearance] : the appearance of the neck was normal [Neck Cervical Mass (___cm)] : no neck mass was observed [Jugular Venous Distention Increased] : there was no jugular-venous distention [Thyroid Diffuse Enlargement] : the thyroid was not enlarged [Thyroid Nodule] : there were no palpable thyroid nodules [Auscultation Breath Sounds / Voice Sounds] : lungs were clear to auscultation bilaterally [Heart Rate And Rhythm] : heart rate was normal and rhythm regular [Heart Sounds] : normal S1 and S2 [Murmurs] : no murmurs [Heart Sounds Gallop] : no gallops [Heart Sounds Pericardial Friction Rub] : no pericardial rub [Abdomen Soft] : soft [Abdomen Tenderness] : non-tender [Bowel Sounds] : normal bowel sounds [Cervical Lymph Nodes Enlarged Posterior Bilaterally] : posterior cervical [] : no hepato-splenomegaly [Abdomen Mass (___ Cm)] : no abdominal mass palpated [Supraclavicular Lymph Nodes Enlarged Bilaterally] : supraclavicular [Cervical Lymph Nodes Enlarged Anterior Bilaterally] : anterior cervical [No CVA Tenderness] : no ~M costovertebral angle tenderness [No Spinal Tenderness] : no spinal tenderness [Abnormal Walk] : normal gait [Motor Tone] : muscle strength and tone were normal [Nail Clubbing] : no clubbing  or cyanosis of the fingernails [Musculoskeletal - Swelling] : no joint swelling seen [Affect] : the affect was normal [Impaired Insight] : insight and judgment were intact [Oriented To Time, Place, And Person] : oriented to person, place, and time

## 2020-09-01 NOTE — PATIENT PROFILE ADULT. - SURGICAL SITE INCISION
Suturegard Intro: Intraoperative tissue expansion was performed, utilizing the SUTUREGARD device, in order to reduce wound tension. no

## 2020-09-02 ENCOUNTER — OUTPATIENT (OUTPATIENT)
Dept: OUTPATIENT SERVICES | Facility: HOSPITAL | Age: 70
LOS: 1 days | End: 2020-09-02
Payer: MEDICAID

## 2020-09-02 ENCOUNTER — APPOINTMENT (OUTPATIENT)
Dept: RADIOLOGY | Facility: IMAGING CENTER | Age: 70
End: 2020-09-02

## 2020-09-02 ENCOUNTER — APPOINTMENT (OUTPATIENT)
Dept: CARDIOLOGY | Facility: CLINIC | Age: 70
End: 2020-09-02
Payer: MEDICAID

## 2020-09-02 ENCOUNTER — NON-APPOINTMENT (OUTPATIENT)
Age: 70
End: 2020-09-02

## 2020-09-02 ENCOUNTER — APPOINTMENT (OUTPATIENT)
Dept: ULTRASOUND IMAGING | Facility: IMAGING CENTER | Age: 70
End: 2020-09-02

## 2020-09-02 VITALS
WEIGHT: 168 LBS | BODY MASS INDEX: 27.99 KG/M2 | DIASTOLIC BLOOD PRESSURE: 100 MMHG | OXYGEN SATURATION: 97 % | HEIGHT: 65 IN | SYSTOLIC BLOOD PRESSURE: 142 MMHG | HEART RATE: 82 BPM | TEMPERATURE: 97.5 F

## 2020-09-02 DIAGNOSIS — Z12.11 ENCOUNTER FOR SCREENING FOR MALIGNANT NEOPLASM OF COLON: ICD-10-CM

## 2020-09-02 DIAGNOSIS — I50.9 HEART FAILURE, UNSPECIFIED: ICD-10-CM

## 2020-09-02 DIAGNOSIS — Z12.12 ENCOUNTER FOR SCREENING FOR MALIGNANT NEOPLASM OF COLON: ICD-10-CM

## 2020-09-02 PROCEDURE — 71046 X-RAY EXAM CHEST 2 VIEWS: CPT

## 2020-09-02 PROCEDURE — 76700 US EXAM ABDOM COMPLETE: CPT

## 2020-09-02 PROCEDURE — 93000 ELECTROCARDIOGRAM COMPLETE: CPT

## 2020-09-02 PROCEDURE — 76700 US EXAM ABDOM COMPLETE: CPT | Mod: 26

## 2020-09-02 PROCEDURE — 71046 X-RAY EXAM CHEST 2 VIEWS: CPT | Mod: 26

## 2020-09-02 PROCEDURE — 99204 OFFICE O/P NEW MOD 45 MIN: CPT

## 2020-09-02 RX ORDER — LOSARTAN POTASSIUM 50 MG/1
50 TABLET, FILM COATED ORAL DAILY
Qty: 90 | Refills: 1 | Status: DISCONTINUED | COMMUNITY
Start: 2020-09-02 | End: 2020-09-02

## 2020-09-02 NOTE — PHYSICAL EXAM
[Normal Appearance] : normal appearance [General Appearance - Well Developed] : well developed [Well Groomed] : well groomed [General Appearance - Well Nourished] : well nourished [No Deformities] : no deformities [General Appearance - In No Acute Distress] : no acute distress [Normal Conjunctiva] : the conjunctiva exhibited no abnormalities [Eyelids - No Xanthelasma] : the eyelids demonstrated no xanthelasmas [Normal Jugular Venous A Waves Present] : normal jugular venous A waves present [Normal Jugular Venous V Waves Present] : normal jugular venous V waves present [No Jugular Venous Coleman A Waves] : no jugular venous coleman A waves [Heart Rate And Rhythm] : heart rate and rhythm were normal [Heart Sounds] : normal S1 and S2 [Murmurs] : no murmurs present [Respiration, Rhythm And Depth] : normal respiratory rhythm and effort [Exaggerated Use Of Accessory Muscles For Inspiration] : no accessory muscle use [Auscultation Breath Sounds / Voice Sounds] : lungs were clear to auscultation bilaterally [Abdomen Soft] : soft [Abdomen Tenderness] : non-tender [Abdomen Mass (___ Cm)] : no abdominal mass palpated [Nail Clubbing] : no clubbing of the fingernails [Gait - Sufficient For Exercise Testing] : the gait was sufficient for exercise testing [Abnormal Walk] : normal gait [Cyanosis, Localized] : no localized cyanosis [Petechial Hemorrhages (___cm)] : no petechial hemorrhages [No Venous Stasis] : no venous stasis [] : no rash [Skin Color & Pigmentation] : normal skin color and pigmentation [Skin Lesions] : no skin lesions [No Skin Ulcers] : no skin ulcer [No Xanthoma] : no  xanthoma was observed [Mood] : the mood was normal [Oriented To Time, Place, And Person] : oriented to person, place, and time [Affect] : the affect was normal [No Anxiety] : not feeling anxious

## 2020-09-02 NOTE — REASON FOR VISIT
[Initial Evaluation] : an initial evaluation of [Abnormal ECG] : an abnormal ECG [Heart Failure] : congestive heart failure [FreeTextEntry2] : and establish cardiac care

## 2020-09-02 NOTE — HISTORY OF PRESENT ILLNESS
[FreeTextEntry1] : Saintonge is a 69yo male with PMH of CHF, HTN and CKD who presents today to establish cardiac care. He states he was referred by Dr. Sterling for clearance prior to undergoing colonoscopy. Patient is a poor historian.  He states he at times has chest pain, this occurs at rest and unclear if exacerbated by activity. Patient denies dyspnea or palpitations. [facility] record reviewed, patient was hospitalized for dyspnea  2/28/20-3/4/20. He underwent echo which found severe MR, Mild AR, severely dilated left atrium, severe global left ventricular systolic dysfunction and severe diastolic dysfunction (Stage III). He underwent FLORINDA which was negative for thrombus or PFO. He also underwent left and right heart cath. LHC showed luminal disease, no interventions decreased ef

## 2020-09-03 ENCOUNTER — APPOINTMENT (OUTPATIENT)
Dept: CARDIOLOGY | Facility: CLINIC | Age: 70
End: 2020-09-03
Payer: MEDICAID

## 2020-09-03 PROCEDURE — 78452 HT MUSCLE IMAGE SPECT MULT: CPT

## 2020-09-03 PROCEDURE — A9500: CPT

## 2020-09-03 PROCEDURE — 93306 TTE W/DOPPLER COMPLETE: CPT

## 2020-09-03 PROCEDURE — 93015 CV STRESS TEST SUPVJ I&R: CPT

## 2020-09-03 RX ORDER — REGADENOSON 0.08 MG/ML
0.4 INJECTION, SOLUTION INTRAVENOUS
Qty: 1 | Refills: 0 | Status: COMPLETED | OUTPATIENT
Start: 2020-09-03

## 2020-09-03 RX ADMIN — REGADENOSON 0 MG/5ML: 0.08 INJECTION, SOLUTION INTRAVENOUS at 00:00

## 2020-09-07 LAB
25(OH)D3 SERPL-MCNC: 29.1 NG/ML
ALBUMIN SERPL ELPH-MCNC: 4.2 G/DL
ALP BLD-CCNC: 78 U/L
ALT SERPL-CCNC: 14 U/L
ANION GAP SERPL CALC-SCNC: 13 MMOL/L
APPEARANCE: CLEAR
AST SERPL-CCNC: 24 U/L
BACTERIA: NEGATIVE
BASOPHILS # BLD AUTO: 0.05 K/UL
BASOPHILS NFR BLD AUTO: 1 %
BILIRUB SERPL-MCNC: 1.1 MG/DL
BILIRUBIN URINE: NEGATIVE
BLOOD URINE: NEGATIVE
BUN SERPL-MCNC: 15 MG/DL
CALCIUM SERPL-MCNC: 9.2 MG/DL
CHLORIDE SERPL-SCNC: 105 MMOL/L
CHOLEST SERPL-MCNC: 137 MG/DL
CHOLEST/HDLC SERPL: 4.1 RATIO
CO2 SERPL-SCNC: 21 MMOL/L
COLOR: YELLOW
CREAT SERPL-MCNC: 1.58 MG/DL
EOSINOPHIL # BLD AUTO: 0.11 K/UL
EOSINOPHIL NFR BLD AUTO: 2.2 %
ESTIMATED AVERAGE GLUCOSE: 126 MG/DL
GLUCOSE QUALITATIVE U: NEGATIVE
GLUCOSE SERPL-MCNC: 83 MG/DL
HBA1C MFR BLD HPLC: 6 %
HCT VFR BLD CALC: 44 %
HDLC SERPL-MCNC: 34 MG/DL
HGB BLD-MCNC: 14.2 G/DL
HYALINE CASTS: 2 /LPF
IMM GRANULOCYTES NFR BLD AUTO: 0.2 %
KETONES URINE: NEGATIVE
LDLC SERPL CALC-MCNC: 79 MG/DL
LEUKOCYTE ESTERASE URINE: NEGATIVE
LYMPHOCYTES # BLD AUTO: 2.22 K/UL
LYMPHOCYTES NFR BLD AUTO: 44.8 %
MAN DIFF?: NORMAL
MCHC RBC-ENTMCNC: 29 PG
MCHC RBC-ENTMCNC: 32.3 GM/DL
MCV RBC AUTO: 90 FL
MICROSCOPIC-UA: NORMAL
MONOCYTES # BLD AUTO: 0.63 K/UL
MONOCYTES NFR BLD AUTO: 12.7 %
NEUTROPHILS # BLD AUTO: 1.93 K/UL
NEUTROPHILS NFR BLD AUTO: 39.1 %
NITRITE URINE: NEGATIVE
NT-PROBNP SERPL-MCNC: 859 PG/ML
PH URINE: 6
PLATELET # BLD AUTO: 149 K/UL
POTASSIUM SERPL-SCNC: 4.1 MMOL/L
PROT SERPL-MCNC: 7.6 G/DL
PROTEIN URINE: NORMAL
PSA SERPL-MCNC: 2.99 NG/ML
RBC # BLD: 4.89 M/UL
RBC # FLD: 13.7 %
RED BLOOD CELLS URINE: 4 /HPF
SODIUM SERPL-SCNC: 139 MMOL/L
SPECIFIC GRAVITY URINE: 1.03
SQUAMOUS EPITHELIAL CELLS: 0 /HPF
T4 FREE SERPL-MCNC: 1.2 NG/DL
TRIGL SERPL-MCNC: 124 MG/DL
TSH SERPL-ACNC: 0.62 UIU/ML
UROBILINOGEN URINE: NORMAL
WBC # FLD AUTO: 4.95 K/UL
WHITE BLOOD CELLS URINE: 2 /HPF

## 2020-09-16 ENCOUNTER — APPOINTMENT (OUTPATIENT)
Dept: CARDIOLOGY | Facility: CLINIC | Age: 70
End: 2020-09-16
Payer: MEDICAID

## 2020-09-16 ENCOUNTER — NON-APPOINTMENT (OUTPATIENT)
Age: 70
End: 2020-09-16

## 2020-09-16 VITALS
TEMPERATURE: 97.8 F | DIASTOLIC BLOOD PRESSURE: 80 MMHG | SYSTOLIC BLOOD PRESSURE: 138 MMHG | HEIGHT: 65 IN | BODY MASS INDEX: 27.49 KG/M2 | WEIGHT: 165 LBS | HEART RATE: 81 BPM | OXYGEN SATURATION: 97 %

## 2020-09-16 DIAGNOSIS — R31.29 OTHER MICROSCOPIC HEMATURIA: ICD-10-CM

## 2020-09-16 DIAGNOSIS — N28.1 CYST OF KIDNEY, ACQUIRED: ICD-10-CM

## 2020-09-16 DIAGNOSIS — E55.9 VITAMIN D DEFICIENCY, UNSPECIFIED: ICD-10-CM

## 2020-09-16 DIAGNOSIS — I49.3 VENTRICULAR PREMATURE DEPOLARIZATION: ICD-10-CM

## 2020-09-16 PROCEDURE — 99214 OFFICE O/P EST MOD 30 MIN: CPT

## 2020-09-16 PROCEDURE — 93000 ELECTROCARDIOGRAM COMPLETE: CPT

## 2020-09-16 NOTE — PHYSICAL EXAM
[General Appearance - Well Developed] : well developed [Normal Appearance] : normal appearance [Well Groomed] : well groomed [General Appearance - Well Nourished] : well nourished [No Deformities] : no deformities [Eyelids - No Xanthelasma] : the eyelids demonstrated no xanthelasmas [General Appearance - In No Acute Distress] : no acute distress [Normal Conjunctiva] : the conjunctiva exhibited no abnormalities [No Oral Pallor] : no oral pallor [Normal Oral Mucosa] : normal oral mucosa [No Oral Cyanosis] : no oral cyanosis [Normal Jugular Venous V Waves Present] : normal jugular venous V waves present [Normal Jugular Venous A Waves Present] : normal jugular venous A waves present [No Jugular Venous Coleman A Waves] : no jugular venous coleman A waves [Exaggerated Use Of Accessory Muscles For Inspiration] : no accessory muscle use [Auscultation Breath Sounds / Voice Sounds] : lungs were clear to auscultation bilaterally [Respiration, Rhythm And Depth] : normal respiratory rhythm and effort [Heart Rate And Rhythm] : heart rate and rhythm were normal [Heart Sounds] : normal S1 and S2 [Abdomen Soft] : soft [Abdomen Tenderness] : non-tender [Abnormal Walk] : normal gait [Abdomen Mass (___ Cm)] : no abdominal mass palpated [Gait - Sufficient For Exercise Testing] : the gait was sufficient for exercise testing [Nail Clubbing] : no clubbing of the fingernails [Petechial Hemorrhages (___cm)] : no petechial hemorrhages [Cyanosis, Localized] : no localized cyanosis [] : no rash [Skin Color & Pigmentation] : normal skin color and pigmentation [No Venous Stasis] : no venous stasis [Skin Lesions] : no skin lesions [No Skin Ulcers] : no skin ulcer [No Xanthoma] : no  xanthoma was observed [Affect] : the affect was normal [Oriented To Time, Place, And Person] : oriented to person, place, and time [Mood] : the mood was normal [No Anxiety] : not feeling anxious [FreeTextEntry1] : 2/6 systolic murmur

## 2020-09-16 NOTE — HISTORY OF PRESENT ILLNESS
[FreeTextEntry1] : This is a 70 year old male with PMH of CHF, HTN and CKD who presents today to follow up after recently being seen in our office on Sept 2, 2020. Patient s/p Nuclear Stress Test, which was unremarkable and an ECHO which showed an EF of %25-%30. Patient was taken off Losartan at the last visit and was placed on Hydralazine and Imdur. Patient had an Abdominal US which showed a 2 cm cyst, patient to follow up with Dr. Knight. Patient also with Microscopic Hematuria and to follow up with Dr. Ho, urology. Patient to start taking Vitamin D 2K units daily. Patient states that he still experiences some chest pain, however, it has not worsened in severity.  pt denies any  dizziness ,lightheadedness ,nausea vomiting diaphoresis\par

## 2020-09-16 NOTE — REVIEW OF SYSTEMS
[Chest  Pressure] : chest pressure [Chest Pain] : chest pain [Negative] : Endocrine [Shortness Of Breath] : no shortness of breath [Palpitations] : no palpitations [Dyspnea on exertion] : not dyspnea during exertion [Leg Claudication] : no intermittent leg claudication

## 2020-09-17 LAB
ANION GAP SERPL CALC-SCNC: 10 MMOL/L
BUN SERPL-MCNC: 12 MG/DL
CALCIUM SERPL-MCNC: 9.6 MG/DL
CHLORIDE SERPL-SCNC: 105 MMOL/L
CO2 SERPL-SCNC: 24 MMOL/L
CREAT SERPL-MCNC: 1.59 MG/DL
GLUCOSE SERPL-MCNC: 77 MG/DL
NT-PROBNP SERPL-MCNC: 963 PG/ML
POTASSIUM SERPL-SCNC: 3.8 MMOL/L
SODIUM SERPL-SCNC: 139 MMOL/L

## 2020-09-21 ENCOUNTER — APPOINTMENT (OUTPATIENT)
Dept: ELECTROPHYSIOLOGY | Facility: CLINIC | Age: 70
End: 2020-09-21
Payer: MEDICAID

## 2020-09-21 VITALS
HEART RATE: 72 BPM | DIASTOLIC BLOOD PRESSURE: 87 MMHG | HEIGHT: 65 IN | TEMPERATURE: 97 F | OXYGEN SATURATION: 99 % | SYSTOLIC BLOOD PRESSURE: 136 MMHG | BODY MASS INDEX: 28.29 KG/M2

## 2020-09-21 VITALS — BODY MASS INDEX: 28.29 KG/M2 | WEIGHT: 170 LBS

## 2020-09-21 PROCEDURE — 93000 ELECTROCARDIOGRAM COMPLETE: CPT

## 2020-09-21 PROCEDURE — 99205 OFFICE O/P NEW HI 60 MIN: CPT

## 2020-09-21 NOTE — PHYSICAL EXAM
[General Appearance - Well Developed] : well developed [Normal Appearance] : normal appearance [Well Groomed] : well groomed [General Appearance - Well Nourished] : well nourished [No Deformities] : no deformities [General Appearance - In No Acute Distress] : no acute distress [Normal Conjunctiva] : the conjunctiva exhibited no abnormalities [Eyelids - No Xanthelasma] : the eyelids demonstrated no xanthelasmas [Normal Oral Mucosa] : normal oral mucosa [No Oral Pallor] : no oral pallor [No Oral Cyanosis] : no oral cyanosis [Normal Jugular Venous A Waves Present] : normal jugular venous A waves present [Normal Jugular Venous V Waves Present] : normal jugular venous V waves present [No Jugular Venous Coleman A Waves] : no jugular venous coleman A waves [Heart Rate And Rhythm] : heart rate and rhythm were normal [Heart Sounds] : normal S1 and S2 [Murmurs] : no murmurs present [Respiration, Rhythm And Depth] : normal respiratory rhythm and effort [Exaggerated Use Of Accessory Muscles For Inspiration] : no accessory muscle use [Auscultation Breath Sounds / Voice Sounds] : lungs were clear to auscultation bilaterally [Abdomen Soft] : soft [Abdomen Tenderness] : non-tender [Abdomen Mass (___ Cm)] : no abdominal mass palpated [Abnormal Walk] : normal gait [Gait - Sufficient For Exercise Testing] : the gait was sufficient for exercise testing [Nail Clubbing] : no clubbing of the fingernails [Cyanosis, Localized] : no localized cyanosis [Petechial Hemorrhages (___cm)] : no petechial hemorrhages [Skin Color & Pigmentation] : normal skin color and pigmentation [] : no rash [No Venous Stasis] : no venous stasis [Skin Lesions] : no skin lesions [No Skin Ulcers] : no skin ulcer [No Xanthoma] : no  xanthoma was observed [Oriented To Time, Place, And Person] : oriented to person, place, and time [Affect] : the affect was normal [Mood] : the mood was normal [No Anxiety] : not feeling anxious

## 2020-09-21 NOTE — HISTORY OF PRESENT ILLNESS
[FreeTextEntry1] : Pipo Riddle MD\par \par Saintonge Jacques is a 69y/o man with Hx of HTN, HLD, both of which are stable, CKD, mod-severe MR, and NICM/chronic systolic CHF who presents today for initial evaluation. Was hospitalized back in March for worsening SOB. ECHO at that time revealed severe MR and severe LV systolic dysfunction/Severe diastolic dysfunction (Stage III). FLORINDA to evaluate MR found to have moderate MR, mild AR, severe global dysfunction and no thrombus of PFO on FLORINDA. He underwent left and right heart cath. LHC showed luminal disease and RHC via brachial access with CI 2.17 and CO 3.84. Since that time, has been maintained on OMT. Feeling well. Underwent Holter with Cardiologist which revealed frequent isolated monomorphic PVCs, 5% burden overall, asymptomatic. Denies chest pain, palpitations, SOB, syncope or near syncope.

## 2020-09-23 ENCOUNTER — NON-APPOINTMENT (OUTPATIENT)
Age: 70
End: 2020-09-23

## 2020-09-23 ENCOUNTER — APPOINTMENT (OUTPATIENT)
Dept: CARDIOLOGY | Facility: CLINIC | Age: 70
End: 2020-09-23

## 2020-09-24 ENCOUNTER — NON-APPOINTMENT (OUTPATIENT)
Age: 70
End: 2020-09-24

## 2020-09-30 ENCOUNTER — APPOINTMENT (OUTPATIENT)
Dept: HEART FAILURE | Facility: CLINIC | Age: 70
End: 2020-09-30
Payer: MEDICAID

## 2020-09-30 VITALS
OXYGEN SATURATION: 98 % | DIASTOLIC BLOOD PRESSURE: 90 MMHG | WEIGHT: 168 LBS | BODY MASS INDEX: 27.99 KG/M2 | HEART RATE: 80 BPM | SYSTOLIC BLOOD PRESSURE: 143 MMHG | HEIGHT: 65 IN

## 2020-09-30 PROCEDURE — 99205 OFFICE O/P NEW HI 60 MIN: CPT

## 2020-09-30 PROCEDURE — 99215 OFFICE O/P EST HI 40 MIN: CPT

## 2020-09-30 NOTE — PHYSICAL EXAM
[General Appearance - Well Developed] : well developed [General Appearance - Well Nourished] : well nourished [Normal Conjunctiva] : the conjunctiva exhibited no abnormalities [Heart Rate And Rhythm] : heart rate and rhythm were normal [Heart Sounds] : normal S1 and S2 [] : no respiratory distress [Respiration, Rhythm And Depth] : normal respiratory rhythm and effort [Bowel Sounds] : normal bowel sounds [Abdomen Soft] : soft [Abdomen Tenderness] : non-tender [Abnormal Walk] : normal gait [Nail Clubbing] : no clubbing of the fingernails [Cyanosis, Localized] : no localized cyanosis [Oriented To Time, Place, And Person] : oriented to person, place, and time [Impaired Insight] : insight and judgment were intact [FreeTextEntry1] : no edema

## 2020-09-30 NOTE — ASSESSMENT
[FreeTextEntry1] : 71 YO M with a history of ACC/AHA Stage C NICM (LV 6.6 cm, LVEF 25-30%) with severe functional MR, history of poorly controlled HTN, and CKD III (baseline Cr 1.6) presenting to HF clinic for further management.\par \par He appears euvolemic/dry on exam and reports well controlled NYHA II symptoms. He has room for further uptitration of neurohormonal therapy. I suspect his cardiomyopathy is related to hypertensive heart disease but will work up further. Amyloid on the differential given some increased wall thickness and restrictive diastology but less likely.

## 2020-09-30 NOTE — HISTORY OF PRESENT ILLNESS
[FreeTextEntry1] : Referring: Dr. Jeremiah Riddle\par \par Mr. Torrez is a 69 YO M with a history of ACC/AHA Stage C NICM (LV 6.6 cm, LVEF 25-30%) with severe functional MR, history of poorly controlled HTN, and CKD III (baseline Cr 1.6) presenting to HF clinic for further management.\par \par His prior history is unclear, he denies having known history of heart failure but states he was following with Dr. Knapp at [facility] since ~2016. A review of prior noncardiac encounters reveals systolic blood pressures frequently in 170-180 mmHg range. \par \par He was admitted 3/2020 with dyspnea and found to have newly discovered LV dysfunction. R/LHC performed revealing no significant CAD, low filling pressures, and borderline cardiac output. He was found on this admission to also have severe MR though on FLORINDA it was quantified as moderate in severity. He has not had ED visits of hospitalizations since then. \par \par He presents today for consultation. He feel well overall and states he can walk 10 blocks before having to stop. He states he can climb several flights of stairs before having to stop. He denies orthopnea. Notes occasional LE edema. Denies palpitations, dizziness, or lightheadedness. Denies chest pain or chest pressure. \par \par

## 2020-09-30 NOTE — DISCUSSION/SUMMARY
[FreeTextEntry1] : # ACC/AHA Stage C NICM\par - Etiology: suspect hypertensive cardiomyopathy but will obtain cardiac MRI to assess for LGE. will also obtain serum free light chains with next set of labs and consider technetium pyrophosphate scan pending MRI \par - GDMT: current regimen is carvedilol 3.125 mg BID, entresto 24-26 mg BID, hydralizine 25 mg TID, and imdur 30 mg daily. will increase entresto to 49-51 mg BID. eventually switch imdur to isordil. eventual spironolactone \par - Diuretic: current regimen is lasix 40 mg in the morning and 20 mg in the evening. will decrease to 20 mg daily\par - Device: would reassess LVEF on maximal medical therapy. he has seen Dr. Duncan from EP and patient himself is reluctant to proceed \par - Labs: 9/2020\par \par # Severe functional MR\par - will reassess on TTE when maximized on medical therapy \par \par # HTN\par - controlling with GDMT as above \par \par # CKD III - baseline Cr ~1.6\par - continue to monitor renal function \par \par RTC with NP in 1 month and me in 2 months

## 2020-10-28 ENCOUNTER — APPOINTMENT (OUTPATIENT)
Dept: HEART FAILURE | Facility: CLINIC | Age: 70
End: 2020-10-28
Payer: MEDICAID

## 2020-10-28 VITALS
TEMPERATURE: 97.6 F | OXYGEN SATURATION: 94 % | BODY MASS INDEX: 27.99 KG/M2 | HEART RATE: 79 BPM | SYSTOLIC BLOOD PRESSURE: 116 MMHG | WEIGHT: 168 LBS | HEIGHT: 65 IN | RESPIRATION RATE: 12 BRPM | DIASTOLIC BLOOD PRESSURE: 72 MMHG

## 2020-10-28 PROCEDURE — 99214 OFFICE O/P EST MOD 30 MIN: CPT

## 2020-10-28 PROCEDURE — 99072 ADDL SUPL MATRL&STAF TM PHE: CPT

## 2020-10-28 NOTE — PHYSICAL THERAPY INITIAL EVALUATION ADULT - PATIENT/FAMILY/SIGNIFICANT OTHER GOALS STATEMENT, PT EVAL
to return home [FreeTextEntry1] : \par On 12/30/19 TSH 1.68, T4  7.1, free T4  1.0, T3  165, Thyroid antibodies negative.\par Thyroid US (11/5/19) Right Lobe 3.9x1.1x0.1 cm; Left lobe 3.4x1.2x1.0 cm. Left lower pole nodule 0.3x0.2x0.1 cm\par \par

## 2020-10-29 LAB
ANION GAP SERPL CALC-SCNC: 8 MMOL/L
BUN SERPL-MCNC: 15 MG/DL
CALCIUM SERPL-MCNC: 9.2 MG/DL
CHLORIDE SERPL-SCNC: 105 MMOL/L
CO2 SERPL-SCNC: 24 MMOL/L
CREAT SERPL-MCNC: 1.65 MG/DL
DEPRECATED KAPPA LC FREE/LAMBDA SER: 2.55 RATIO
GLUCOSE SERPL-MCNC: 90 MG/DL
KAPPA LC CSF-MCNC: 1.78 MG/DL
KAPPA LC SERPL-MCNC: 4.54 MG/DL
NT-PROBNP SERPL-MCNC: 567 PG/ML
POTASSIUM SERPL-SCNC: 4.4 MMOL/L
SODIUM SERPL-SCNC: 138 MMOL/L

## 2020-10-29 RX ORDER — POLYETHYLENE GLYCOL 3350, SODIUM CHLORIDE, SODIUM BICARBONATE AND POTASSIUM CHLORIDE WITH LEMON FLAVOR 420; 11.2; 5.72; 1.48 G/4L; G/4L; G/4L; G/4L
420 POWDER, FOR SOLUTION ORAL
Qty: 1 | Refills: 0 | Status: DISCONTINUED | COMMUNITY
Start: 2020-08-25 | End: 2020-10-29

## 2020-10-29 NOTE — PHYSICAL EXAM
[General Appearance - Well Developed] : well developed [General Appearance - Well Nourished] : well nourished [Normal Conjunctiva] : the conjunctiva exhibited no abnormalities [] : no respiratory distress [Respiration, Rhythm And Depth] : normal respiratory rhythm and effort [Heart Rate And Rhythm] : heart rate and rhythm were normal [Heart Sounds] : normal S1 and S2 [Bowel Sounds] : normal bowel sounds [Abdomen Soft] : soft [Abdomen Tenderness] : non-tender [Abnormal Walk] : normal gait [Nail Clubbing] : no clubbing of the fingernails [Cyanosis, Localized] : no localized cyanosis [Oriented To Time, Place, And Person] : oriented to person, place, and time [Impaired Insight] : insight and judgment were intact [FreeTextEntry1] : no edema

## 2020-10-29 NOTE — ADDENDUM
[FreeTextEntry1] : 10/29: Called and spoke to patient's niece since Mr. Torrez wasn't home. She went through the medications Mr. Torrez had at home and carvedilol was not there. All the other medications were accurate. I asked her to confirm that he was not taking this medication and if he wasn't then to start taking it now (i sent a prescription to his pharmacy). I will also have him increase Entresto to  mg BID since his renal function and K are stable. He will need a repeat BMP in 7-10 days. He will also only take Lasix PRN for weight gain, which I reviewed with the niece. She will review this with Mr. Torrez when he gets home and will if call if they have any further questions.

## 2020-10-29 NOTE — DISCUSSION/SUMMARY
[FreeTextEntry1] : # ACC/AHA Stage C NICM\par - Etiology: suspect hypertensive cardiomyopathy but will obtain cardiac MRI to assess for LGE (Scheduled for 11/12/20). will also obtain serum free light chains today and consider technetium pyrophosphate scan pending MRI \par - GDMT: current regimen is carvedilol 3.125 mg BID, entresto 49-51 mg BID, hydralazine 25 mg TID, and imdur 30 mg daily. If renal function and K+ are stable will plan to increase entresto to  mg BID. eventually switch imdur to isordil. eventual spironolactone \par - Diuretic: If increasing Entresto will make lasix 20 mg PRN for weight gain\par - Device: would reassess LVEF on maximal medical therapy. he has seen Dr. Duncan from EP and patient himself is reluctant to proceed. \par - Labs: Pending results of labs today\par \par # Severe functional MR\par - will reassess on TTE when maximized on medical therapy \par \par # HTN\par - controlling with GDMT as above \par \par # CKD III - baseline Cr ~1.6\par - continue to monitor renal function \par \par RTC in 1 month with Dr. Sparks\par \par \par -The above note is scribed by Jazz Smith FNP student at [employer]

## 2020-10-29 NOTE — HISTORY OF PRESENT ILLNESS
[FreeTextEntry1] : Referring: Dr. Jeremiah Riddle\par \par Mr. Torrez is a 71 YO M with a history of ACC/AHA Stage C NICM (LV 6.6 cm, LVEF 25-30%) with severe functional MR, history of poorly controlled HTN, and CKD III (baseline Cr 1.6) presenting to HF clinic for further management.\par \par His prior history is unclear, he denies having known history of heart failure but states he was following with Dr. Knapp at [facility] since ~2016. A review of prior noncardiac encounters reveals systolic blood pressures frequently in 170-180 mmHg range. \par \par He was admitted 3/2020 with dyspnea and found to have newly discovered LV dysfunction. R/LHC performed revealing no significant CAD, low filling pressures, and borderline cardiac output. He was found on this admission to also have severe MR though on FLORINDA it was quantified as moderate in severity. He has not had ED visits of hospitalizations since then. \par \par States he overall feels well. Is currently working in construction as a . Is able to walk up to 10 blocks before tiring. States he intermittently has to stop when climbing stairs to catch his breath, but is able to resume activity without difficulty. Reports one brief episode of dizziness yesterday, no precipitating factors and no alleviating factors of note. He does report not taking his diuretic everyday, because it interferes with his work day. States taking occasional doses at night throughout week. Denies SOB, orthopnea or cough. Denies any LE swelling. Unable to reconcile medications, but thinks he increased Entresto dose from last visit. No chest pain or palpitation. Maintains low salt diet and has no issues with his appetite.

## 2020-10-29 NOTE — ASSESSMENT
[FreeTextEntry1] : 69 YO M with a history of ACC/AHA Stage C NICM (LV 6.6 cm, LVEF 25-30%) with severe functional MR, history of poorly controlled HTN, and CKD III (baseline Cr 1.6) presenting to HF clinic for further management.\par \par He appears euvolemic on exam and reports well controlled NYHA II symptoms. He has room for further uptitration of neurohormonal therapy. I suspect his cardiomyopathy is related to hypertensive heart disease but will work up further. Amyloid on the differential given some increased wall thickness and restrictive diastology but less likely. \par \par

## 2020-11-02 LAB — M PROTEIN SPEC IFE-MCNC: NORMAL

## 2020-11-12 ENCOUNTER — OUTPATIENT (OUTPATIENT)
Dept: OUTPATIENT SERVICES | Facility: HOSPITAL | Age: 70
LOS: 1 days | End: 2020-11-12
Payer: MEDICAID

## 2020-11-12 ENCOUNTER — APPOINTMENT (OUTPATIENT)
Dept: MRI IMAGING | Facility: CLINIC | Age: 70
End: 2020-11-12
Payer: MEDICAID

## 2020-11-12 DIAGNOSIS — Z00.8 ENCOUNTER FOR OTHER GENERAL EXAMINATION: ICD-10-CM

## 2020-11-12 PROCEDURE — 75561 CARDIAC MRI FOR MORPH W/DYE: CPT | Mod: 26

## 2020-11-12 PROCEDURE — 75561 CARDIAC MRI FOR MORPH W/DYE: CPT

## 2020-11-12 PROCEDURE — A9585: CPT

## 2020-12-09 ENCOUNTER — APPOINTMENT (OUTPATIENT)
Dept: HEART FAILURE | Facility: CLINIC | Age: 70
End: 2020-12-09
Payer: MEDICAID

## 2020-12-09 VITALS
OXYGEN SATURATION: 98 % | SYSTOLIC BLOOD PRESSURE: 107 MMHG | HEIGHT: 65 IN | TEMPERATURE: 97.7 F | DIASTOLIC BLOOD PRESSURE: 62 MMHG | RESPIRATION RATE: 16 BRPM | WEIGHT: 168 LBS | BODY MASS INDEX: 27.99 KG/M2 | HEART RATE: 80 BPM

## 2020-12-09 PROCEDURE — 99072 ADDL SUPL MATRL&STAF TM PHE: CPT

## 2020-12-09 PROCEDURE — 99215 OFFICE O/P EST HI 40 MIN: CPT

## 2020-12-09 RX ORDER — ADHESIVE TAPE 3"X 2.3 YD
50 MCG TAPE, NON-MEDICATED TOPICAL
Qty: 30 | Refills: 3 | Status: DISCONTINUED | COMMUNITY
Start: 2020-09-16 | End: 2020-12-09

## 2020-12-09 NOTE — DISCUSSION/SUMMARY
[FreeTextEntry1] : # ACC/AHA Stage C NICM\par - Etiology: suspect hypertensive cardiomyopathy, no signs of infiltrative disease on cMRI\par - GDMT: current regimen is carvedilol 3.125 mg BID, entresto  mg BID, hydralazine 25 mg TID, and imdur 30 mg daily. will increase carvedilol to 6.25 mg BID and start spironolactone 25 mg daily. eventual switch from imdur to isordil and eventual SGL2i but has had issues with med compliance so will avoid making too many changes today. \par - Diuretic: currently on lasix 10 mg BID, will continue\par - Device: would reassess LVEF on maximal medical therapy. he has seen Dr. Duncan from EP and patient himself is reluctant to proceed. \par - Labs: check labs in 1 week after starting spironolactone \par \par # Severe functional MR\par - will reassess on TTE when maximized on medical therapy \par \par # HTN\par - controlling with GDMT as above \par \par # CKD III - baseline Cr ~1.6\par - continue to monitor renal function \par \par RTC in 2 months\par

## 2020-12-09 NOTE — HISTORY OF PRESENT ILLNESS
[FreeTextEntry1] : Referring: Dr. Jeremiah Riddle\par \par Mr. Torrez is a 69 YO M with a history of ACC/AHA Stage C NICM (LV 6.6 cm, LVEF 25-30%) with severe functional MR, history of poorly controlled HTN, and CKD III (baseline Cr 1.6) presenting to HF clinic for further management.\par \par His prior history is unclear, he denies having known history of heart failure but states he was following with Dr. Knapp at [facility] since ~2016. A review of prior noncardiac encounters reveals systolic blood pressures frequently in 170-180 mmHg range. \par \par He was admitted 3/2020 with dyspnea and found to have newly discovered LV dysfunction. R/LHC performed revealing no significant CAD, low filling pressures, and borderline cardiac output. He was found on this admission to also have severe MR though on FLORINDA it was quantified as moderate in severity. He has not had ED visits of hospitalizations since then. I sent for cardiac MRI which revealed nonspecific mesocardial LGE in the septum. \par \par At last visit with NP his Entresto was increased to  mg BID.\par \par He presents today for followup. He feels well overall. Previously he could only walk 20 steps but now he can walk 2 flights before having to stop. Can walk 10 blocks without issues. Denies orthopnea or lower extremity edema. Denies palpitations. Denies dizziness aside from rare occasions like a fleeting moment that is self resolving.

## 2020-12-09 NOTE — ASSESSMENT
[FreeTextEntry1] : 71 YO M with a history of ACC/AHA Stage C NICM (LV 6.6 cm, LVEF 25-30%) with severe functional MR, history of poorly controlled HTN, and CKD III (baseline Cr 1.6) presenting to HF clinic for further management.\par \par He appears euvolemic on exam and reports well controlled NYHA II symptoms and tolerating escalating doses of guideline directed medical therapy. \par \par

## 2020-12-23 PROBLEM — Z12.11 ENCOUNTER FOR COLORECTAL CANCER SCREENING: Status: RESOLVED | Noted: 2020-08-25 | Resolved: 2020-12-23

## 2021-02-02 ENCOUNTER — RX RENEWAL (OUTPATIENT)
Age: 71
End: 2021-02-02

## 2021-02-03 ENCOUNTER — RX RENEWAL (OUTPATIENT)
Age: 71
End: 2021-02-03

## 2021-02-10 ENCOUNTER — APPOINTMENT (OUTPATIENT)
Dept: HEART FAILURE | Facility: CLINIC | Age: 71
End: 2021-02-10
Payer: MEDICAID

## 2021-02-10 VITALS
BODY MASS INDEX: 28.99 KG/M2 | RESPIRATION RATE: 16 BRPM | OXYGEN SATURATION: 100 % | HEIGHT: 65 IN | DIASTOLIC BLOOD PRESSURE: 64 MMHG | HEART RATE: 80 BPM | TEMPERATURE: 97.6 F | WEIGHT: 174 LBS | SYSTOLIC BLOOD PRESSURE: 120 MMHG

## 2021-02-10 PROCEDURE — 99215 OFFICE O/P EST HI 40 MIN: CPT

## 2021-02-10 PROCEDURE — 99072 ADDL SUPL MATRL&STAF TM PHE: CPT

## 2021-02-10 RX ORDER — ISOSORBIDE MONONITRATE 30 MG/1
30 TABLET, EXTENDED RELEASE ORAL DAILY
Qty: 30 | Refills: 0 | Status: DISCONTINUED | COMMUNITY
Start: 2020-09-02 | End: 2021-02-10

## 2021-02-10 RX ORDER — FUROSEMIDE 20 MG/1
20 TABLET ORAL
Qty: 30 | Refills: 3 | Status: DISCONTINUED | COMMUNITY
End: 2021-02-10

## 2021-02-10 NOTE — HISTORY OF PRESENT ILLNESS
[FreeTextEntry1] : Referring: Dr. Jeremiah Riddle\par \par Mr. Torrez is a 69 YO M with a history of ACC/AHA Stage C NICM (LV 6.6 cm, LVEF 25-30%) with severe functional MR, history of poorly controlled HTN, and CKD III (baseline Cr 1.6) presenting to HF clinic for further management.\par \par His prior history is unclear, he denies having known history of heart failure but states he was following with Dr. Knapp at [facility] since ~2016. A review of prior noncardiac encounters reveals systolic blood pressures frequently in 170-180 mmHg range. \par \par He was admitted 3/2020 with dyspnea and found to have newly discovered LV dysfunction. R/LHC performed revealing no significant CAD, low filling pressures, and borderline cardiac output. He was found on this admission to also have severe MR though on FLORINDA it was quantified as moderate in severity. He has not had ED visits of hospitalizations since then. I sent for cardiac MRI which revealed nonspecific mesocardial LGE in the septum. \par \par At last visit he was doing well with NYHA II symptoms and carvedilol was increased. \par \par He presents today for followup. He noted an episode of dizziness/syncope back in December but has not had episodes since then. Denies dyspnea on exertion and can climb 3-4 flights of stairs without difficulty. Denies orthopnea or lower extremity edema. Denies palpitations. Denies chest pain or chest pressure. 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

## 2021-02-10 NOTE — DISCUSSION/SUMMARY
[FreeTextEntry1] : # ACC/AHA Stage C NICM\par - Etiology: suspect hypertensive cardiomyopathy, no signs of infiltrative disease on cMRI\par - GDMT: current regimen is carvedilol 6.25 mg BID, entresto  mg BID, spironolactone 25 mg daily. hydralazine 25 mg TID, and imdur 30 mg daily. will increase carvedilol to 12.5 mg BID and switch imdur to isordil 20 mg TID.  eventual SGL2i\par - Diuretic: will discontinue diuretics given fall back in December and no evidence of volume expansion \par - Device: will reassess LVEF at f/u visit for ICD candidacy. however he has seen Dr. Duncan from EP and patient himself was reluctant to proceed. \par - Labs: 10/2020, repeat today \par \par # Severe functional MR\par - will reassess on TTE at f/u visit\par \par # HTN\par - controlling with GDMT as above \par \par # CKD III - baseline Cr ~1.6\par - continue to monitor renal function \par \par RTC in 3 months with TTE \par

## 2021-02-10 NOTE — ASSESSMENT
[FreeTextEntry1] : 69 YO M with a history of ACC/AHA Stage C NICM (LV 6.6 cm, LVEF 25-30%) with severe functional MR, history of poorly controlled HTN, and CKD III (baseline Cr 1.6) presenting to HF clinic for further management.\par \par He appears euvolemic on exam and reports well controlled NYHA I symptoms and tolerating escalating doses of guideline directed medical therapy. \par \par

## 2021-02-11 LAB
ANION GAP SERPL CALC-SCNC: 16 MMOL/L
BUN SERPL-MCNC: 19 MG/DL
CALCIUM SERPL-MCNC: 9.3 MG/DL
CHLORIDE SERPL-SCNC: 99 MMOL/L
CO2 SERPL-SCNC: 19 MMOL/L
CREAT SERPL-MCNC: 2.12 MG/DL
GLUCOSE SERPL-MCNC: 60 MG/DL
NT-PROBNP SERPL-MCNC: 116 PG/ML
POTASSIUM SERPL-SCNC: 5.1 MMOL/L
SODIUM SERPL-SCNC: 134 MMOL/L

## 2021-03-08 NOTE — ED ADULT TRIAGE NOTE - MEANS OF ARRIVAL
Crescentic Complex Repair Preamble Text (Leave Blank If You Do Not Want): Extensive wide undermining was performed. Information: Selecting Yes will display possible errors in your note based on the variables you have selected. This validation is only offered as a suggestion for you. PLEASE NOTE THAT THE VALIDATION TEXT WILL BE REMOVED WHEN YOU FINALIZE YOUR NOTE. IF YOU WANT TO FAX A PRELIMINARY NOTE YOU WILL NEED TO TOGGLE THIS TO 'NO' IF YOU DO NOT WANT IT IN YOUR FAXED NOTE. Otolaryngologist Procedure Text (B): After obtaining clear surgical margins the patient was sent to otolaryngology for surgical repair.  The patient understands they will receive post-surgical care and follow-up from the referring physician's office. Validate That Anesthesia Volume Is Not Zero (If You Leave At 0 It Will Not Render In Note): No ambulatory Quadrants Reporting?: 0 Donor Site Anesthesia Type: same as repair anesthesia Pain Refusal Text: I offered to prescribe pain medication but the patient refused to take this medication. Partial Purse String (Simple) Text: Given the location of the defect and the characteristics of the surrounding skin a simple purse string closure was deemed most appropriate.  Undermining was performed circumfirentially around the surgical defect.  A purse string suture was then placed and tightened. Wound tension only allowed a partial closure of the circular defect. Consent 1/Introductory Paragraph: The rationale for Mohs was explained to the patient and consent was obtained. The risks, benefits and alternatives to therapy were discussed in detail. Specifically, the risks of infection, scarring, bleeding, prolonged wound healing, incomplete removal, allergy to anesthesia, nerve injury and recurrence were addressed. Prior to the procedure, the treatment site was clearly identified and confirmed by the patient. All components of Universal Protocol/PAUSE Rule completed. Consent (Ear)/Introductory Paragraph: The rationale for Mohs was explained to the patient and consent was obtained. The risks, benefits and alternatives to therapy were discussed in detail. Specifically, the risks of ear deformity, infection, scarring, bleeding, prolonged wound healing, incomplete removal, allergy to anesthesia, nerve injury and recurrence were addressed. Prior to the procedure, the treatment site was clearly identified and confirmed by the patient. All components of Universal Protocol/PAUSE Rule completed. Number Of Hemigard Strips Per Side: 1 Mohs Histo Method Verbiage: Each section was then chromacoded and processed in the Mohs lab using the Mohs protocol and submitted for frozen section. Length To Time In Minutes Device Was In Place: 10 Provider Procedure Text (F): After obtaining clear surgical margins the defect was repaired by another provider. Surgical Defect Width In Cm (Optional): 1.3 Nostril Rim Text: The closure involved the nostril rim. Non-Graft Cartilage Fenestration Text: The cartilage was fenestrated with a 2mm punch biopsy to help facilitate healing. Melolabial Transposition Flap Text: The defect edges were debeveled with a #15 scalpel blade.  Given the location of the defect and the proximity to free margins a melolabial flap was deemed most appropriate.  Using a sterile surgical marker, an appropriate melolabial transposition flap was drawn incorporating the defect.    The area thus outlined was incised deep to adipose tissue with a #15 scalpel blade.  The skin margins were undermined to an appropriate distance in all directions utilizing iris scissors. Oculoplastic Surgeon Procedure Text (B): After obtaining clear surgical margins the patient was sent to oculoplastics for surgical repair.  The patient understands they will receive post-surgical care and follow-up from the referring physician's office. Home Suture Removal Text: Patient was provided instructions on removing sutures and will remove their sutures at home.  If they have any questions or difficulties they will call the office. Spiral Flap Text: The defect edges were debeveled with a #15 scalpel blade.  Given the location of the defect, shape of the defect and the proximity to free margins a spiral flap was deemed most appropriate.  Using a sterile surgical marker, an appropriate rotation flap was drawn incorporating the defect and placing the expected incisions within the relaxed skin tension lines where possible. The area thus outlined was incised deep to adipose tissue with a #15 scalpel blade.  The skin margins were undermined to an appropriate distance in all directions utilizing iris scissors. Advancement-Rotation Flap Text: The defect edges were debeveled with a #15 scalpel blade.  Given the location of the defect, shape of the defect and the proximity to free margins an advancement-rotation flap was deemed most appropriate.  Using a sterile surgical marker, an appropriate flap was drawn incorporating the defect and placing the expected incisions within the relaxed skin tension lines where possible. The area thus outlined was incised deep to adipose tissue with a #15 scalpel blade.  The skin margins were undermined to an appropriate distance in all directions utilizing iris scissors. Cartilage Graft Text: The defect edges were debeveled with a #15 scalpel blade.  Given the location of the defect, shape of the defect, the fact the defect involved a full thickness cartilage defect a cartilage graft was deemed most appropriate.  An appropriate donor site was identified, cleansed, and anesthetized. The cartilage graft was then harvested and transferred to the recipient site, oriented appropriately and then sutured into place.  The secondary defect was then repaired using a primary closure. Mid-Level Procedure Text (F): After obtaining clear surgical margins the patient was sent to a mid-level provider for surgical repair.  The patient understands they will receive post-surgical care and follow-up from the mid-level provider. Cheiloplasty (Less Than 50%) Text: A decision was made to reconstruct the defect with a  cheiloplasty.  The defect was undermined extensively.  Additional obicularis oris muscle was excised with a 15 blade scalpel.  The defect was converted into a full thickness wedge, of less than 50% of the vertical height of the lip, to facilite a better cosmetic result.  Small vessels were then tied off with 5-0 monocyrl. The obicularis oris, superficial fascia, adipose and dermis were then reapproximated.  After the deeper layers were approximated the epidermis was reapproximated with particular care given to realign the vermilion border. Special Stains Stage 3 - Results: Base On Clearance Noted Above Area L Indication Text: Tumors in this location are included in Area L (trunk and extremities).  Mohs surgery is indicated for larger tumors, or tumors with aggressive histologic features, in these anatomic locations. Cheek Interpolation Flap Text: A decision was made to reconstruct the defect utilizing an interpolation axial flap and a staged reconstruction.  A telfa template was made of the defect.  This telfa template was then used to outline the Cheek Interpolation flap.  The donor area for the pedicle flap was then injected with anesthesia.  The flap was excised through the skin and subcutaneous tissue down to the layer of the underlying musculature.  The interpolation flap was carefully excised within this deep plane to maintain its blood supply.  The edges of the donor site were undermined.   The donor site was closed in a primary fashion.  The pedicle was then rotated into position and sutured.  Once the tube was sutured into place, adequate blood supply was confirmed with blanching and refill.  The pedicle was then wrapped with xeroform gauze and dressed appropriately with a telfa and gauze bandage to ensure continued blood supply and protect the attached pedicle. O-Z Plasty Text: The defect edges were debeveled with a #15 scalpel blade.  Given the location of the defect, shape of the defect and the proximity to free margins an O-Z plasty (double transposition flap) was deemed most appropriate.  Using a sterile surgical marker, the appropriate transposition flaps were drawn incorporating the defect and placing the expected incisions within the relaxed skin tension lines where possible.    The area thus outlined was incised deep to adipose tissue with a #15 scalpel blade.  The skin margins were undermined to an appropriate distance in all directions utilizing iris scissors.  Hemostasis was achieved with electrocautery.  The flaps were then transposed into place, one clockwise and the other counterclockwise, and anchored with interrupted buried subcutaneous sutures. Location Indication Override (Is Already Calculated Based On Selected Body Location): Area M Consent (Nose)/Introductory Paragraph: The rationale for Mohs was explained to the patient and consent was obtained. The risks, benefits and alternatives to therapy were discussed in detail. Specifically, the risks of nasal deformity, changes in the flow of air through the nose, infection, scarring, bleeding, prolonged wound healing, incomplete removal, allergy to anesthesia, nerve injury and recurrence were addressed. Prior to the procedure, the treatment site was clearly identified and confirmed by the patient. All components of Universal Protocol/PAUSE Rule completed. Mart-1 - Negative Histology Text: MART-1 staining demonstrates a normal density and pattern of melanocytes along the dermal-epidermal junction. The surgical margins are negative for tumor cells. Crescentic Advancement Flap Text: The defect edges were debeveled with a #15 scalpel blade.  Given the location of the defect and the proximity to free margins a crescentic advancement flap was deemed most appropriate.  Using a sterile surgical marker, the appropriate advancement flap was drawn incorporating the defect and placing the expected incisions within the relaxed skin tension lines where possible.    The area thus outlined was incised deep to adipose tissue with a #15 scalpel blade.  The skin margins were undermined to an appropriate distance in all directions utilizing iris scissors. Referred To Asc For Closure Text (Leave Blank If You Do Not Want): After obtaining clear surgical margins the patient was sent to an ASC for surgical repair.  The patient understands they will receive post-surgical care and follow-up from the ASC physician. Wound Care (No Sutures): Petrolatum No Residual Tumor Seen Histology Text: There were no malignant cells seen in the sections examined. Island Pedicle Flap Text: The defect edges were debeveled with a #15 scalpel blade.  Given the location of the defect, shape of the defect and the proximity to free margins an island pedicle advancement flap was deemed most appropriate.  Using a sterile surgical marker, an appropriate advancement flap was drawn incorporating the defect, outlining the appropriate donor tissue and placing the expected incisions within the relaxed skin tension lines where possible.    The area thus outlined was incised deep to adipose tissue with a #15 scalpel blade.  The skin margins were undermined to an appropriate distance in all directions around the primary defect and laterally outward around the island pedicle utilizing iris scissors.  There was minimal undermining beneath the pedicle flap. Bilateral Helical Rim Advancement Flap Text: The defect edges were debeveled with a #15 blade scalpel.  Given the location of the defect and the proximity to free margins (helical rim) a bilateral helical rim advancement flap was deemed most appropriate.  Using a sterile surgical marker, the appropriate advancement flaps were drawn incorporating the defect and placing the expected incisions between the helical rim and antihelix where possible.  The area thus outlined was incised through and through with a #15 scalpel blade.  With a skin hook and iris scissors, the flaps were gently and sharply undermined and freed up. Show Date Of Previous Biopsy Variable: Yes Partial Purse String (Intermediate) Text: Given the location of the defect and the characteristics of the surrounding skin an intermediate purse string closure was deemed most appropriate.  Undermining was performed circumfirentially around the surgical defect.  A purse string suture was then placed and tightened. Wound tension only allowed a partial closure of the circular defect. Consent 2/Introductory Paragraph: Mohs surgery was explained to the patient and consent was obtained. The risks, benefits and alternatives to therapy were discussed in detail. Specifically, the risks of infection, scarring, bleeding, prolonged wound healing, incomplete removal, allergy to anesthesia, nerve injury and recurrence were addressed. Prior to the procedure, the treatment site was clearly identified and confirmed by the patient. All components of Universal Protocol/PAUSE Rule completed. Width Of Defect Perpendicular To Closure In Cm (Required): 1.5 Body Location Override (Optional - Billing Will Still Be Based On Selected Body Map Location If Applicable): Right Central Zygoma ([facility]-14) Epidermal Closure Graft Donor Site (Optional): simple interrupted Stage 15: Additional Anesthesia Type: 1% lidocaine with epinephrine Rhombic Flap Text: The defect edges were debeveled with a #15 scalpel blade.  Given the location of the defect and the proximity to free margins a rhombic flap was deemed most appropriate.  Using a sterile surgical marker, an appropriate rhombic flap was drawn incorporating the defect.    The area thus outlined was incised deep to adipose tissue with a #15 scalpel blade.  The skin margins were undermined to an appropriate distance in all directions utilizing iris scissors. Star Wedge Flap Text: The defect edges were debeveled with a #15 scalpel blade.  Given the location of the defect, shape of the defect and the proximity to free margins a star wedge flap was deemed most appropriate.  Using a sterile surgical marker, an appropriate rotation flap was drawn incorporating the defect and placing the expected incisions within the relaxed skin tension lines where possible. The area thus outlined was incised deep to adipose tissue with a #15 scalpel blade.  The skin margins were undermined to an appropriate distance in all directions utilizing iris scissors. Surgeon Performing Repair (Optional): NANCY Hernandez Mercedes Flap Text: The defect edges were debeveled with a #15 scalpel blade.  Given the location of the defect, shape of the defect and the proximity to free margins a Mercedes flap was deemed most appropriate.  Using a sterile surgical marker, an appropriate advancement flap was drawn incorporating the defect and placing the expected incisions within the relaxed skin tension lines where possible. The area thus outlined was incised deep to adipose tissue with a #15 scalpel blade.  The skin margins were undermined to an appropriate distance in all directions utilizing iris scissors. Graft Cartilage Fenestration Text: The cartilage was fenestrated with a 2mm punch biopsy to help facilitate graft survival and healing. Additional Anesthesia Volume In Cc: 6 Staging Info: By selecting yes to the question above you will include information on AJCC 8 tumor staging in your Mohs note. Information on tumor staging will be automatically added for SCCs on the head and neck. AJCC 8 includes tumor size, tumor depth, perineural involvement and bone invasion. Cheiloplasty (Complex) Text: A decision was made to reconstruct the defect with a  cheiloplasty.  The defect was undermined extensively.  Additional obicularis oris muscle was excised with a 15 blade scalpel.  The defect was converted into a full thickness wedge to facilite a better cosmetic result.  Small vessels were then tied off with 5-0 monocyrl. The obicularis oris, superficial fascia, adipose and dermis were then reapproximated.  After the deeper layers were approximated the epidermis was reapproximated with particular care given to realign the vermilion border. Cheek-To-Nose Interpolation Flap Text: A decision was made to reconstruct the defect utilizing an interpolation axial flap and a staged reconstruction.  A telfa template was made of the defect.  This telfa template was then used to outline the Cheek-To-Nose Interpolation flap.  The donor area for the pedicle flap was then injected with anesthesia.  The flap was excised through the skin and subcutaneous tissue down to the layer of the underlying musculature.  The interpolation flap was carefully excised within this deep plane to maintain its blood supply.  The edges of the donor site were undermined.   The donor site was closed in a primary fashion.  The pedicle was then rotated into position and sutured.  Once the tube was sutured into place, adequate blood supply was confirmed with blanching and refill.  The pedicle was then wrapped with xeroform gauze and dressed appropriately with a telfa and gauze bandage to ensure continued blood supply and protect the attached pedicle. Double O-Z Plasty Text: The defect edges were debeveled with a #15 scalpel blade.  Given the location of the defect, shape of the defect and the proximity to free margins a Double O-Z plasty (double transposition flap) was deemed most appropriate.  Using a sterile surgical marker, the appropriate transposition flaps were drawn incorporating the defect and placing the expected incisions within the relaxed skin tension lines where possible. The area thus outlined was incised deep to adipose tissue with a #15 scalpel blade.  The skin margins were undermined to an appropriate distance in all directions utilizing iris scissors.  Hemostasis was achieved with electrocautery.  The flaps were then transposed into place, one clockwise and the other counterclockwise, and anchored with interrupted buried subcutaneous sutures. Inflammation Suggestive Of Cancer Camouflage Histology Text: There was a dense lymphocytic infiltrate which prevented adequate histologic evaluation of adjacent structures. Island Pedicle Flap With Canthal Suspension Text: The defect edges were debeveled with a #15 scalpel blade.  Given the location of the defect, shape of the defect and the proximity to free margins an island pedicle advancement flap was deemed most appropriate.  Using a sterile surgical marker, an appropriate advancement flap was drawn incorporating the defect, outlining the appropriate donor tissue and placing the expected incisions within the relaxed skin tension lines where possible. The area thus outlined was incised deep to adipose tissue with a #15 scalpel blade.  The skin margins were undermined to an appropriate distance in all directions around the primary defect and laterally outward around the island pedicle utilizing iris scissors.  There was minimal undermining beneath the pedicle flap. A suspension suture was placed in the canthal tendon to prevent tension and prevent ectropion. Consent (Lip)/Introductory Paragraph: The rationale for Mohs was explained to the patient and consent was obtained. The risks, benefits and alternatives to therapy were discussed in detail. Specifically, the risks of lip deformity, changes in the oral aperture, infection, scarring, bleeding, prolonged wound healing, incomplete removal, allergy to anesthesia, nerve injury and recurrence were addressed. Prior to the procedure, the treatment site was clearly identified and confirmed by the patient. All components of Universal Protocol/PAUSE Rule completed. Depth Of Tumor Invasion (For Histology): tumor not visualized Tumor Debulked?: curette Composite Graft Text: The defect edges were debeveled with a #15 scalpel blade.  Given the location of the defect, shape of the defect, the proximity to free margins and the fact the defect was full thickness a composite graft was deemed most appropriate.  The defect was outline and then transferred to the donor site.  A full thickness graft was then excised from the donor site. The graft was then placed in the primary defect, oriented appropriately and then sutured into place.  The secondary defect was then repaired using a primary closure. Ear Star Wedge Flap Text: The defect edges were debeveled with a #15 blade scalpel.  Given the location of the defect and the proximity to free margins (helical rim) an ear star wedge flap was deemed most appropriate.  Using a sterile surgical marker, the appropriate flap was drawn incorporating the defect and placing the expected incisions between the helical rim and antihelix where possible.  The area thus outlined was incised through and through with a #15 scalpel blade. Closure 3 Information: This tab is for additional flaps and grafts above and beyond our usual structured repairs.  Please note if you enter information here it will not currently bill and you will need to add the billing information manually. A-T Advancement Flap Text: The defect edges were debeveled with a #15 scalpel blade.  Given the location of the defect, shape of the defect and the proximity to free margins an A-T advancement flap was deemed most appropriate.  Using a sterile surgical marker, an appropriate advancement flap was drawn incorporating the defect and placing the expected incisions within the relaxed skin tension lines where possible.    The area thus outlined was incised deep to adipose tissue with a #15 scalpel blade.  The skin margins were undermined to an appropriate distance in all directions utilizing iris scissors. Referred To Plastics For Closure Text (Leave Blank If You Do Not Want): After obtaining clear surgical margins the patient was sent to plastics for surgical repair.  The patient understands they will receive post-surgical care and follow-up from the referring physician's office. Chonodrocutaneous Helical Advancement Flap Text: The defect edges were debeveled with a #15 scalpel blade.  Given the location of the defect and the proximity to free margins a chondrocutaneous helical advancement flap was deemed most appropriate.  Using a sterile surgical marker, the appropriate advancement flap was drawn incorporating the defect and placing the expected incisions within the relaxed skin tension lines where possible.    The area thus outlined was incised deep to adipose tissue with a #15 scalpel blade.  The skin margins were undermined to an appropriate distance in all directions utilizing iris scissors. O-T Plasty Text: The defect edges were debeveled with a #15 scalpel blade.  Given the location of the defect, shape of the defect and the proximity to free margins an O-T plasty was deemed most appropriate.  Using a sterile surgical marker, an appropriate O-T plasty was drawn incorporating the defect and placing the expected incisions within the relaxed skin tension lines where possible.    The area thus outlined was incised deep to adipose tissue with a #15 scalpel blade.  The skin margins were undermined to an appropriate distance in all directions utilizing iris scissors. Zygomaticofacial Flap Text: Given the location of the defect, shape of the defect and the proximity to free margins a zygomaticofacial flap was deemed most appropriate for repair.  Using a sterile surgical marker, the appropriate flap was drawn incorporating the defect and placing the expected incisions within the relaxed skin tension lines where possible. The area thus outlined was incised deep to adipose tissue with a #15 scalpel blade with preservation of a vascular pedicle.  The skin margins were undermined to an appropriate distance in all directions utilizing iris scissors.  The flap was then placed into the defect and anchored with interrupted buried subcutaneous sutures. Dorsal Nasal Flap Text: The defect edges were debeveled with a #15 scalpel blade.  Given the location of the defect and the proximity to free margins a dorsal nasal flap was deemed most appropriate.  Using a sterile surgical marker, an appropriate dorsal nasal flap was drawn around the defect.    The area thus outlined was incised deep to adipose tissue with a #15 scalpel blade.  The skin margins were undermined to an appropriate distance in all directions utilizing iris scissors. Helical Rim Advancement Flap Text: The defect edges were debeveled with a #15 blade scalpel.  Given the location of the defect and the proximity to free margins (helical rim) a double helical rim advancement flap was deemed most appropriate.  Using a sterile surgical marker, the appropriate advancement flaps were drawn incorporating the defect and placing the expected incisions between the helical rim and antihelix where possible.  The area thus outlined was incised through and through with a #15 scalpel blade.  With a skin hook and iris scissors, the flaps were gently and sharply undermined and freed up. Same Histology In Subsequent Stages Text: The pattern and morphology of the tumor is as described in the first stage. Purse String (Simple) Text: Given the location of the defect and the characteristics of the surrounding skin a purse string closure was deemed most appropriate.  Undermining was performed circumfirentially around the surgical defect.  A purse string suture was then placed and tightened. Complex Repair And Graft Additional Text (Will Appearing After The Standard Complex Repair Text): The complex repair was not sufficient to completely close the primary defect. The remaining additional defect was repaired with the graft mentioned below. Consent (Spinal Accessory)/Introductory Paragraph: The rationale for Mohs was explained to the patient and consent was obtained. The risks, benefits and alternatives to therapy were discussed in detail. Specifically, the risks of damage to the spinal accessory nerve, infection, scarring, bleeding, prolonged wound healing, incomplete removal, allergy to anesthesia, and recurrence were addressed. Prior to the procedure, the treatment site was clearly identified and confirmed by the patient. All components of Universal Protocol/PAUSE Rule completed. Medical Necessity Statement: Based on my medical judgement, Mohs surgery is the most appropriate treatment for this cancer compared to other treatments. Hemigard Intro: Due to skin fragility and wound tension, it was decided to use HEMIGARD adhesive retention suture devices to permit a linear closure. The skin was cleaned and dried for a 6cm distance away from the wound. Excessive hair, if present, was removed to allow for adhesion. Helical Rim Text: The closure involved the helical rim. Previous Accession (Optional): [id number] Tumor Depth: Less than 6mm from granular layer and no invasion beyond the subcutaneous fat Nasalis-Muscle-Based Myocutaneous Island Pedicle Flap Text: Using a #15 blade, an incision was made around the donor flap to the level of the nasalis muscle. Wide lateral undermining was then performed in both the subcutaneous plane above the nasalis muscle, and in a submuscular plane just above periosteum. This allowed the formation of a free nasalis muscle axial pedicle (based on the angular artery) which was still attached to the actual cutaneous flap, increasing its mobility and vascular viability. Hemostasis was obtained with pinpoint electrocoagulation. The flap was mobilized into position and the pivotal anchor points positioned and stabilized with buried interrupted sutures. Subcutaneous and dermal tissues were closed in a multilayered fashion with sutures. Tissue redundancies were excised, and the epidermal edges were apposed without significant tension and sutured with sutures. Double O-Z Flap Text: The defect edges were debeveled with a #15 scalpel blade.  Given the location of the defect, shape of the defect and the proximity to free margins a Double O-Z flap was deemed most appropriate.  Using a sterile surgical marker, an appropriate transposition flap was drawn incorporating the defect and placing the expected incisions within the relaxed skin tension lines where possible. The area thus outlined was incised deep to adipose tissue with a #15 scalpel blade.  The skin margins were undermined to an appropriate distance in all directions utilizing iris scissors. Hatchet Flap Text: The defect edges were debeveled with a #15 scalpel blade.  Given the location of the defect, shape of the defect and the proximity to free margins a hatchet flap was deemed most appropriate.  Using a sterile surgical marker, an appropriate hatchet flap was drawn incorporating the defect and placing the expected incisions within the relaxed skin tension lines where possible.    The area thus outlined was incised deep to adipose tissue with a #15 scalpel blade.  The skin margins were undermined to an appropriate distance in all directions utilizing iris scissors. M-Plasty Intermediate Repair Preamble Text (Leave Blank If You Do Not Want): Undermining was performed with blunt dissection. Mohs Method Verbiage: An incision at a 45 degree angle following the standard Mohs approach was done and the specimen was harvested as a microscopic controlled layer. Anesthesia Volume In Cc: 3.5 Purse String (Intermediate) Text: Given the location of the defect and the characteristics of the surrounding skin a purse string intermediate closure was deemed most appropriate.  Undermining was performed circumfirentially around the surgical defect.  A purse string suture was then placed and tightened. Closure 2 Information: This tab is for additional flaps and grafts, including complex repair and grafts and complex repair and flaps. You can also specify a different location for the additional defect, if the location is the same you do not need to select a new one. We will insert the automated text for the repair you select below just as we do for solitary flaps and grafts. Please note that at this time if you select a location with a different insurance zone you will need to override the ICD10 and CPT if appropriate. Wound Care: Vaseline Alternatives Discussed Intro (Do Not Add Period): I discussed alternative treatments to Mohs surgery and specifically discussed the risks and benefits of Hemigard Postcare Instructions: The HEMIGARD strips are to remain completely dry for at least 5-7 days. Consent (Near Eyelid Margin)/Introductory Paragraph: The rationale for Mohs was explained to the patient and consent was obtained. The risks, benefits and alternatives to therapy were discussed in detail. Specifically, the risks of ectropion or eyelid deformity, infection, scarring, bleeding, prolonged wound healing, incomplete removal, allergy to anesthesia, nerve injury and recurrence were addressed. Prior to the procedure, the treatment site was clearly identified and confirmed by the patient. All components of Universal Protocol/PAUSE Rule completed. Vermilion Border Text: The closure involved the vermilion border. Surgeon/Pathologist Verbiage (Will Incorporate Name Of Surgeon From Intro If Not Blank): operated in two distinct and integrated capacities as the surgeon and pathologist. V-Y Flap Text: The defect edges were debeveled with a #15 scalpel blade.  Given the location of the defect, shape of the defect and the proximity to free margins a V-Y flap was deemed most appropriate.  Using a sterile surgical marker, an appropriate advancement flap was drawn incorporating the defect and placing the expected incisions within the relaxed skin tension lines where possible.    The area thus outlined was incised deep to adipose tissue with a #15 scalpel blade.  The skin margins were undermined to an appropriate distance in all directions utilizing iris scissors. Unna Boot Text: An Unna boot was placed to help immobilize the limb and facilitate more rapid healing. Rotation Flap Text: The defect edges were debeveled with a #15 scalpel blade.  Given the location of the defect, shape of the defect and the proximity to free margins a rotation flap was deemed most appropriate.  Using a sterile surgical marker, an appropriate rotation flap was drawn incorporating the defect and placing the expected incisions within the relaxed skin tension lines where possible.    The area thus outlined was incised deep to adipose tissue with a #15 scalpel blade.  The skin margins were undermined to an appropriate distance in all directions utilizing iris scissors. Orbicularis Oris Muscle Flap Text: The defect edges were debeveled with a #15 scalpel blade.  Given that the defect affected the competency of the oral sphincter an obicularis oris muscle flap was deemed most appropriate to restore this competency and normal muscle function.  Using a sterile surgical marker, an appropriate flap was drawn incorporating the defect. The area thus outlined was incised with a #15 scalpel blade. Presence Of Scar Tissue (For Histology): absent Area M Indication Text: Tumors in this location are included in Area M (cheek, forehead, scalp, neck, jawline and pretibial skin).  Mohs surgery is indicated for tumors in these anatomic locations. Burow's Graft Text: The defect edges were debeveled with a #15 scalpel blade.  Given the location of the defect, shape of the defect, the proximity to free margins and the presence of a standing cone deformity a Burow's skin graft was deemed most appropriate. The standing cone was removed and this tissue was then trimmed to the shape of the primary defect. The adipose tissue was also removed until only dermis and epidermis were left.  The skin margins of the secondary defect were undermined to an appropriate distance in all directions utilizing iris scissors.  The secondary defect was closed with interrupted buried subcutaneous sutures.  The skin edges were then re-apposed with running  sutures.  The skin graft was then placed in the primary defect and oriented appropriately. Paramedian Forehead Flap Text: A decision was made to reconstruct the defect utilizing an interpolation axial flap and a staged reconstruction.  A telfa template was made of the defect.  This telfa template was then used to outline the paramedian forehead pedicle flap.  The donor area for the pedicle flap was then injected with anesthesia.  The flap was excised through the skin and subcutaneous tissue down to the layer of the underlying musculature.  The pedicle flap was carefully excised within this deep plane to maintain its blood supply.  The edges of the donor site were undermined.   The donor site was closed in a primary fashion.  The pedicle was then rotated into position and sutured.  Once the tube was sutured into place, adequate blood supply was confirmed with blanching and refill.  The pedicle was then wrapped with xeroform gauze and dressed appropriately with a telfa and gauze bandage to ensure continued blood supply and protect the attached pedicle. Postop Diagnosis: same Skin Substitute Text: The defect edges were debeveled with a #15 scalpel blade.  Given the location of the defect, shape of the defect and the proximity to free margins a skin substitute graft was deemed most appropriate.  The graft material was trimmed to fit the size of the defect. The graft was then placed in the primary defect and oriented appropriately. Is There Documentation In The Chart Showing Discussion Of Changes With Another Physician?: Please Select the Appropriate Response Bilobed Transposition Flap Text: The defect edges were debeveled with a #15 scalpel blade.  Given the location of the defect and the proximity to free margins a bilobed transposition flap was deemed most appropriate.  Using a sterile surgical marker, an appropriate bilobe flap drawn around the defect.    The area thus outlined was incised deep to adipose tissue with a #15 scalpel blade.  The skin margins were undermined to an appropriate distance in all directions utilizing iris scissors. O-Z Flap Text: The defect edges were debeveled with a #15 scalpel blade.  Given the location of the defect, shape of the defect and the proximity to free margins an O-Z flap was deemed most appropriate.  Using a sterile surgical marker, an appropriate transposition flap was drawn incorporating the defect and placing the expected incisions within the relaxed skin tension lines where possible. The area thus outlined was incised deep to adipose tissue with a #15 scalpel blade.  The skin margins were undermined to an appropriate distance in all directions utilizing iris scissors. Ftsg Text: The defect edges were debeveled with a #15 scalpel blade.  Given the location of the defect, shape of the defect and the proximity to free margins a full thickness skin graft was deemed most appropriate.  Using a sterile surgical marker, the primary defect shape was transferred to the donor site. The area thus outlined was incised deep to adipose tissue with a #15 scalpel blade.  The harvested graft was then trimmed of adipose tissue until only dermis and epidermis was left.  The skin margins of the secondary defect were undermined to an appropriate distance in all directions utilizing iris scissors.  The secondary defect was closed with interrupted buried subcutaneous sutures.  The skin edges were then re-apposed with running  sutures.  The skin graft was then placed in the primary defect and oriented appropriately. Mastoid Interpolation Flap Text: A decision was made to reconstruct the defect utilizing an interpolation axial flap and a staged reconstruction.  A telfa template was made of the defect.  This telfa template was then used to outline the mastoid interpolation flap.  The donor area for the pedicle flap was then injected with anesthesia.  The flap was excised through the skin and subcutaneous tissue down to the layer of the underlying musculature.  The pedicle flap was carefully excised within this deep plane to maintain its blood supply.  The edges of the donor site were undermined.   The donor site was closed in a primary fashion.  The pedicle was then rotated into position and sutured.  Once the tube was sutured into place, adequate blood supply was confirmed with blanching and refill.  The pedicle was then wrapped with xeroform gauze and dressed appropriately with a telfa and gauze bandage to ensure continued blood supply and protect the attached pedicle. Bcc Infiltrative Histology Text: There were numerous aggregates of basaloid cells demonstrating an infiltrative pattern. W Plasty Text: The lesion was extirpated to the level of the fat with a #15 scalpel blade.  Given the location of the defect, shape of the defect and the proximity to free margins a W-plasty was deemed most appropriate for repair.  Using a sterile surgical marker, the appropriate transposition arms of the W-plasty were drawn incorporating the defect and placing the expected incisions within the relaxed skin tension lines where possible.    The area thus outlined was incised deep to adipose tissue with a #15 scalpel blade.  The skin margins were undermined to an appropriate distance in all directions utilizing iris scissors.  The opposing transposition arms were then transposed into place in opposite direction and anchored with interrupted buried subcutaneous sutures. Island Pedicle Flap-Requiring Vessel Identification Text: The defect edges were debeveled with a #15 scalpel blade.  Given the location of the defect, shape of the defect and the proximity to free margins an island pedicle advancement flap was deemed most appropriate.  Using a sterile surgical marker, an appropriate advancement flap was drawn, based on the axial vessel mentioned above, incorporating the defect, outlining the appropriate donor tissue and placing the expected incisions within the relaxed skin tension lines where possible.    The area thus outlined was incised deep to adipose tissue with a #15 scalpel blade.  The skin margins were undermined to an appropriate distance in all directions around the primary defect and laterally outward around the island pedicle utilizing iris scissors.  There was minimal undermining beneath the pedicle flap. Advancement Flap (Double) Text: The defect edges were debeveled with a #15 scalpel blade.  Given the location of the defect and the proximity to free margins a double advancement flap was deemed most appropriate.  Using a sterile surgical marker, the appropriate advancement flaps were drawn incorporating the defect and placing the expected incisions within the relaxed skin tension lines where possible.    The area thus outlined was incised deep to adipose tissue with a #15 scalpel blade.  The skin margins were undermined to an appropriate distance in all directions utilizing iris scissors. Dressing (No Sutures): telfa dressing Retention Suture Text: Retention sutures were placed to support the closure and prevent dehiscence. Post-Care Instructions: I reviewed with the patient in detail post-care instructions. Patient is not to engage in any heavy lifting, exercise, or swimming for the next 14 days. Should the patient develop any fevers, chills, bleeding, severe pain patient will contact the office immediately. Tarsorrhaphy Text: A tarsorrhaphy was performed using Frost sutures. Subsequent Stages Histo Method Verbiage: Using a similar technique to that described above, a thin layer of tissue was removed from all areas where tumor was visible on the previous stage.  The tissue was again oriented, mapped, dyed, and processed as above. Epidermal Closure: running Consent (Temporal Branch)/Introductory Paragraph: The rationale for Mohs was explained to the patient and consent was obtained. The risks, benefits and alternatives to therapy were discussed in detail. Specifically, the risks of damage to the temporal branch of the facial nerve, infection, scarring, bleeding, prolonged wound healing, incomplete removal, allergy to anesthesia, and recurrence were addressed. Prior to the procedure, the treatment site was clearly identified and confirmed by the patient. All components of Universal Protocol/PAUSE Rule completed. No Repair - Repaired With Adjacent Surgical Defect Text (Leave Blank If You Do Not Want): After obtaining clear surgical margins the defect was repaired concurrently with another surgical defect which was in close approximation. Graft Donor Site Bandage (Optional-Leave Blank If You Don't Want In Note): Steri-strips and a pressure bandage were applied to the donor site. Suturegard Intro: Intraoperative tissue expansion was performed, utilizing the SUTUREGARD device, in order to reduce wound tension. Tissue Cultured Epidermal Autograft Text: The defect edges were debeveled with a #15 scalpel blade.  Given the location of the defect, shape of the defect and the proximity to free margins a tissue cultured epidermal autograft was deemed most appropriate.  The graft was then trimmed to fit the size of the defect.  The graft was then placed in the primary defect and oriented appropriately. Area H Indication Text: Tumors in this location are included in Area H (eyelids, eyebrows, nose, lips, chin, ear, pre-auricular, post-auricular, temple, genitalia, hands, feet, ankles and areola).  Tissue conservation is critical in these anatomic locations. Split-Thickness Skin Graft Text: The defect edges were debeveled with a #15 scalpel blade.  Given the location of the defect, shape of the defect and the proximity to free margins a split thickness skin graft was deemed most appropriate.  Using a sterile surgical marker, the primary defect shape was transferred to the donor site. The split thickness graft was then harvested.  The skin graft was then placed in the primary defect and oriented appropriately. Burow's Advancement Flap Text: The defect edges were debeveled with a #15 scalpel blade.  Given the location of the defect and the proximity to free margins a Burow's advancement flap was deemed most appropriate.  Using a sterile surgical marker, the appropriate advancement flap was drawn incorporating the defect and placing the expected incisions within the relaxed skin tension lines where possible.    The area thus outlined was incised deep to adipose tissue with a #15 scalpel blade.  The skin margins were undermined to an appropriate distance in all directions utilizing iris scissors. Posterior Auricular Interpolation Flap Text: A decision was made to reconstruct the defect utilizing an interpolation axial flap and a staged reconstruction.  A telfa template was made of the defect.  This telfa template was then used to outline the posterior auricular interpolation flap.  The donor area for the pedicle flap was then injected with anesthesia.  The flap was excised through the skin and subcutaneous tissue down to the layer of the underlying musculature.  The pedicle flap was carefully excised within this deep plane to maintain its blood supply.  The edges of the donor site were undermined.   The donor site was closed in a primary fashion.  The pedicle was then rotated into position and sutured.  Once the tube was sutured into place, adequate blood supply was confirmed with blanching and refill.  The pedicle was then wrapped with xeroform gauze and dressed appropriately with a telfa and gauze bandage to ensure continued blood supply and protect the attached pedicle. Hemigard Retention Suture: 2-0 Nylon Z Plasty Text: The lesion was extirpated to the level of the fat with a #15 scalpel blade.  Given the location of the defect, shape of the defect and the proximity to free margins a Z-plasty was deemed most appropriate for repair.  Using a sterile surgical marker, the appropriate transposition arms of the Z-plasty were drawn incorporating the defect and placing the expected incisions within the relaxed skin tension lines where possible.    The area thus outlined was incised deep to adipose tissue with a #15 scalpel blade.  The skin margins were undermined to an appropriate distance in all directions utilizing iris scissors.  The opposing transposition arms were then transposed into place in opposite direction and anchored with interrupted buried subcutaneous sutures. Retention Suture Bite Size: 3 mm Keystone Flap Text: The defect edges were debeveled with a #15 scalpel blade.  Given the location of the defect, shape of the defect a keystone flap was deemed most appropriate.  Using a sterile surgical marker, an appropriate keystone flap was drawn incorporating the defect, outlining the appropriate donor tissue and placing the expected incisions within the relaxed skin tension lines where possible. The area thus outlined was incised deep to adipose tissue with a #15 scalpel blade.  The skin margins were undermined to an appropriate distance in all directions around the primary defect and laterally outward around the flap utilizing iris scissors. Bi-Rhombic Flap Text: The defect edges were debeveled with a #15 scalpel blade.  Given the location of the defect and the proximity to free margins a bi-rhombic flap was deemed most appropriate.  Using a sterile surgical marker, an appropriate rhombic flap was drawn incorporating the defect. The area thus outlined was incised deep to adipose tissue with a #15 scalpel blade.  The skin margins were undermined to an appropriate distance in all directions utilizing iris scissors. Trilobed Flap Text: The defect edges were debeveled with a #15 scalpel blade.  Given the location of the defect and the proximity to free margins a trilobed flap was deemed most appropriate.  Using a sterile surgical marker, an appropriate trilobed flap drawn around the defect.    The area thus outlined was incised deep to adipose tissue with a #15 scalpel blade.  The skin margins were undermined to an appropriate distance in all directions utilizing iris scissors. Complex Repair And Flap Additional Text (Will Appearing After The Standard Complex Repair Text): The complex repair was not sufficient to completely close the primary defect. The remaining additional defect was repaired with the flap mentioned below. Xenograft Text: The defect edges were debeveled with a #15 scalpel blade.  Given the location of the defect, shape of the defect and the proximity to free margins a xenograft was deemed most appropriate.  The graft was then trimmed to fit the size of the defect.  The graft was then placed in the primary defect and oriented appropriately. Mohs Rapid Report Verbiage: The area of clinically evident tumor was marked with skin marking ink and appropriately hatched.  The initial incision was made following the Mohs approach through the skin.  The specimen was taken to the lab, divided into the necessary number of pieces, chromacoded and processed according to the Mohs protocol.  This was repeated in successive stages until a tumor free defect was achieved. Consent (Marginal Mandibular)/Introductory Paragraph: The rationale for Mohs was explained to the patient and consent was obtained. The risks, benefits and alternatives to therapy were discussed in detail. Specifically, the risks of damage to the marginal mandibular branch of the facial nerve, infection, scarring, bleeding, prolonged wound healing, incomplete removal, allergy to anesthesia, and recurrence were addressed. Prior to the procedure, the treatment site was clearly identified and confirmed by the patient. All components of Universal Protocol/PAUSE Rule completed. Suturegard Body: The suture ends were repeatedly re-tightened and re-clamped to achieve the desired tissue expansion. Estimated Blood Loss (Cc): minimal Nasal Turnover Hinge Flap Text: The defect edges were debeveled with a #15 scalpel blade.  Given the size, depth, location of the defect and the defect being full thickness a nasal turnover hinge flap was deemed most appropriate.  Using a sterile surgical marker, an appropriate hinge flap was drawn incorporating the defect. The area thus outlined was incised with a #15 scalpel blade. The flap was designed to recreate the nasal mucosal lining and the alar rim. The skin margins were undermined to an appropriate distance in all directions utilizing iris scissors. Peng Advancement Flap Text: The defect edges were debeveled with a #15 scalpel blade.  Given the location of the defect, shape of the defect and the proximity to free margins a Peng advancement flap was deemed most appropriate.  Using a sterile surgical marker, an appropriate advancement flap was drawn incorporating the defect and placing the expected incisions within the relaxed skin tension lines where possible. The area thus outlined was incised deep to adipose tissue with a #15 scalpel blade.  The skin margins were undermined to an appropriate distance in all directions utilizing iris scissors. Date Of Previous Biopsy (Optional): 1- Eye Protection Verbiage: Before proceeding with the stage, a plastic scleral shield was inserted. The globe was anesthetized with a few drops of 1% lidocaine with 1:100,000 epinephrine. Then, an appropriate sized scleral shield was chosen and coated with lacrilube ointment. The shield was gently inserted and left in place for the duration of each stage. After the stage was completed, the shield was gently removed. Anesthesia Type: 1% lidocaine with epinephrine and a 1:10 solution of 8.4% sodium bicarbonate Consent 3/Introductory Paragraph: I gave the patient a chance to ask questions they had about the procedure.  Following this I explained the Mohs procedure and consent was obtained. The risks, benefits and alternatives to therapy were discussed in detail. Specifically, the risks of infection, scarring, bleeding, prolonged wound healing, incomplete removal, allergy to anesthesia, nerve injury and recurrence were addressed. Prior to the procedure, the treatment site was clearly identified and confirmed by the patient. All components of Universal Protocol/PAUSE Rule completed. Rhomboid Transposition Flap Text: The defect edges were debeveled with a #15 scalpel blade.  Given the location of the defect and the proximity to free margins a rhomboid transposition flap was deemed most appropriate.  Using a sterile surgical marker, an appropriate rhomboid flap was drawn incorporating the defect.    The area thus outlined was incised deep to adipose tissue with a #15 scalpel blade.  The skin margins were undermined to an appropriate distance in all directions utilizing iris scissors. Patient Name (Optional- Will Render 'the Patient' If Blank): Eleuterio Mark Brow Lift Text: A midfrontal incision was made medially to the defect to allow access to the tissues just superior to the left eyebrow. Following careful dissection inferiorly in a supraperiosteal plane to the level of the left eyebrow, several 3-0 monocryl sutures were used to resuspend the eyebrow orbicularis oculi muscular unit to the superior frontal bone periosteum. This resulted in an appropriate reapproximation of static eyebrow symmetry and correction of the left brow ptosis. Transposition Flap Text: The defect edges were debeveled with a #15 scalpel blade.  Given the location of the defect and the proximity to free margins a transposition flap was deemed most appropriate.  Using a sterile surgical marker, an appropriate transposition flap was drawn incorporating the defect.    The area thus outlined was incised deep to adipose tissue with a #15 scalpel blade.  The skin margins were undermined to an appropriate distance in all directions utilizing iris scissors. Initial Size Of Lesion: 0.8 Modified Advancement Flap Text: The defect edges were debeveled with a #15 scalpel blade.  Given the location of the defect, shape of the defect and the proximity to free margins a modified advancement flap was deemed most appropriate.  Using a sterile surgical marker, an appropriate advancement flap was drawn incorporating the defect and placing the expected incisions within the relaxed skin tension lines where possible.    The area thus outlined was incised deep to adipose tissue with a #15 scalpel blade.  The skin margins were undermined to an appropriate distance in all directions utilizing iris scissors. Hemostasis: Electrocautery V-Y Plasty Text: The defect edges were debeveled with a #15 scalpel blade.  Given the location of the defect, shape of the defect and the proximity to free margins an V-Y advancement flap was deemed most appropriate.  Using a sterile surgical marker, an appropriate advancement flap was drawn incorporating the defect and placing the expected incisions within the relaxed skin tension lines where possible.    The area thus outlined was incised deep to adipose tissue with a #15 scalpel blade.  The skin margins were undermined to an appropriate distance in all directions utilizing iris scissors. Interpolation Flap Text: A decision was made to reconstruct the defect utilizing an interpolation axial flap and a staged reconstruction.  A telfa template was made of the defect.  This telfa template was then used to outline the interpolation flap.  The donor area for the pedicle flap was then injected with anesthesia.  The flap was excised through the skin and subcutaneous tissue down to the layer of the underlying musculature.  The interpolation flap was carefully excised within this deep plane to maintain its blood supply.  The edges of the donor site were undermined.   The donor site was closed in a primary fashion.  The pedicle was then rotated into position and sutured.  Once the tube was sutured into place, adequate blood supply was confirmed with blanching and refill.  The pedicle was then wrapped with xeroform gauze and dressed appropriately with a telfa and gauze bandage to ensure continued blood supply and protect the attached pedicle. Alar Island Pedicle Flap Text: The defect edges were debeveled with a #15 scalpel blade.  Given the location of the defect, shape of the defect and the proximity to the alar rim an island pedicle advancement flap was deemed most appropriate.  Using a sterile surgical marker, an appropriate advancement flap was drawn incorporating the defect, outlining the appropriate donor tissue and placing the expected incisions within the nasal ala running parallel to the alar rim. The area thus outlined was incised with a #15 scalpel blade.  The skin margins were undermined minimally to an appropriate distance in all directions around the primary defect and laterally outward around the island pedicle utilizing iris scissors.  There was minimal undermining beneath the pedicle flap. Bcc Histology Text: There were numerous aggregates of basaloid cells. Banner Transposition Flap Text: The defect edges were debeveled with a #15 scalpel blade.  Given the location of the defect and the proximity to free margins a Banner transposition flap was deemed most appropriate.  Using a sterile surgical marker, an appropriate flap drawn around the defect. The area thus outlined was incised deep to adipose tissue with a #15 scalpel blade.  The skin margins were undermined to an appropriate distance in all directions utilizing iris scissors. Consent (Scalp)/Introductory Paragraph: The rationale for Mohs was explained to the patient and consent was obtained. The risks, benefits and alternatives to therapy were discussed in detail. Specifically, the risks of changes in hair growth pattern secondary to repair, infection, scarring, bleeding, prolonged wound healing, incomplete removal, allergy to anesthesia, nerve injury and recurrence were addressed. Prior to the procedure, the treatment site was clearly identified and confirmed by the patient. All components of Universal Protocol/PAUSE Rule completed. Mart-1 - Positive Histology Text: MART-1 staining demonstrates areas of higher density and clustering of melanocytes with Pagetoid spread upwards within the epidermis. The surgical margins are positive for tumor cells. Epidermal Autograft Text: The defect edges were debeveled with a #15 scalpel blade.  Given the location of the defect, shape of the defect and the proximity to free margins an epidermal autograft was deemed most appropriate.  Using a sterile surgical marker, the primary defect shape was transferred to the donor site. The epidermal graft was then harvested.  The skin graft was then placed in the primary defect and oriented appropriately. Debridement Text: The wound edges were debrided prior to proceeding with the closure to facilitate wound healing. Ear Wedge Repair Text: A wedge excision was completed by carrying down an excision through the full thickness of the ear and cartilage with an inward facing Burow's triangle. The wound was then closed in a layered fashion. O-T Advancement Flap Text: The defect edges were debeveled with a #15 scalpel blade.  Given the location of the defect, shape of the defect and the proximity to free margins an O-T advancement flap was deemed most appropriate.  Using a sterile surgical marker, an appropriate advancement flap was drawn incorporating the defect and placing the expected incisions within the relaxed skin tension lines where possible.    The area thus outlined was incised deep to adipose tissue with a #15 scalpel blade.  The skin margins were undermined to an appropriate distance in all directions utilizing iris scissors. Where Do You Want The Question To Include Opioid Counseling Located?: Case Summary Tab Mauc Instructions: By selecting yes to the question below the MAUC number will be added into the note.  This will be calculated automatically based on the diagnosis chosen, the size entered, the body zone selected (H,M,L) and the specific indications you chose. You will also have the option to override the Mohs AUC if you disagree with the automatically calculated number and this option is found in the Case Summary tab. Manual Repair Warning Statement: We plan on removing the manually selected variable below in favor of our much easier automatic structured text blocks found in the previous tab. We decided to do this to help make the flow better and give you the full power of structured data. Manual selection is never going to be ideal in our platform and I would encourage you to avoid using manual selection from this point on, especially since I will be sunsetting this feature. It is important that you do one of two things with the customized text below. First, you can save all of the text in a word file so you can have it for future reference. Second, transfer the text to the appropriate area in the Library tab. Lastly, if there is a flap or graft type which we do not have you need to let us know right away so I can add it in before the variable is hidden. No need to panic, we plan to give you roughly 6 months to make the change. Referring Physician (Optional): Garfield Giang Muscle Hinge Flap Text: The defect edges were debeveled with a #15 scalpel blade.  Given the size, depth and location of the defect and the proximity to free margins a muscle hinge flap was deemed most appropriate.  Using a sterile surgical marker, an appropriate hinge flap was drawn incorporating the defect. The area thus outlined was incised with a #15 scalpel blade.  The skin margins were undermined to an appropriate distance in all directions utilizing iris scissors. Mucosal Advancement Flap Text: Given the location of the defect, shape of the defect and the proximity to free margins a mucosal advancement flap was deemed most appropriate. Incisions were made with a 15 blade scalpel in the appropriate fashion along the cutaneous vermilion border and the mucosal lip. The remaining actinically damaged mucosal tissue was excised.  The mucosal advancement flap was then elevated to the gingival sulcus with care taken to preserve the neurovascular structures and advanced into the primary defect. Care was taken to ensure that precise realignment of the vermilion border was achieved. Mohs Case Number: [id number] Undermining Type: Entire Wound Epidermal Sutures: 6-0 Fast Absorbing Gut Deep Sutures: 4-0 Monocryl Detail Level: Detailed Double Island Pedicle Flap Text: The defect edges were debeveled with a #15 scalpel blade.  Given the location of the defect, shape of the defect and the proximity to free margins a double island pedicle advancement flap was deemed most appropriate.  Using a sterile surgical marker, an appropriate advancement flap was drawn incorporating the defect, outlining the appropriate donor tissue and placing the expected incisions within the relaxed skin tension lines where possible.    The area thus outlined was incised deep to adipose tissue with a #15 scalpel blade.  The skin margins were undermined to an appropriate distance in all directions around the primary defect and laterally outward around the island pedicle utilizing iris scissors.  There was minimal undermining beneath the pedicle flap. Bilobed Flap Text: The defect edges were debeveled with a #15 scalpel blade.  Given the location of the defect and the proximity to free margins a bilobe flap was deemed most appropriate.  Using a sterile surgical marker, an appropriate bilobe flap drawn around the defect.    The area thus outlined was incised deep to adipose tissue with a #15 scalpel blade.  The skin margins were undermined to an appropriate distance in all directions utilizing iris scissors. Full Thickness Lip Wedge Repair (Flap) Text: Given the location of the defect and the proximity to free margins a full thickness wedge repair was deemed most appropriate.  Using a sterile surgical marker, the appropriate repair was drawn incorporating the defect and placing the expected incisions perpendicular to the vermilion border.  The vermilion border was also meticulously outlined to ensure appropriate reapproximation during the repair.  The area thus outlined was incised through and through with a #15 scalpel blade.  The muscularis and dermis were reaproximated with deep sutures following hemostasis. Care was taken to realign the vermilion border before proceeding with the superficial closure.  Once the vermilion was realigned the superfical and mucosal closure was finished. Dermal Autograft Text: The defect edges were debeveled with a #15 scalpel blade.  Given the location of the defect, shape of the defect and the proximity to free margins a dermal autograft was deemed most appropriate.  Using a sterile surgical marker, the primary defect shape was transferred to the donor site. The area thus outlined was incised deep to adipose tissue with a #15 scalpel blade.  The harvested graft was then trimmed of adipose and epidermal tissue until only dermis was left.  The skin graft was then placed in the primary defect and oriented appropriately. Melolabial Interpolation Flap Text: A decision was made to reconstruct the defect utilizing an interpolation axial flap and a staged reconstruction.  A telfa template was made of the defect.  This telfa template was then used to outline the melolabial interpolation flap.  The donor area for the pedicle flap was then injected with anesthesia.  The flap was excised through the skin and subcutaneous tissue down to the layer of the underlying musculature.  The pedicle flap was carefully excised within this deep plane to maintain its blood supply.  The edges of the donor site were undermined.   The donor site was closed in a primary fashion.  The pedicle was then rotated into position and sutured.  Once the tube was sutured into place, adequate blood supply was confirmed with blanching and refill.  The pedicle was then wrapped with xeroform gauze and dressed appropriately with a telfa and gauze bandage to ensure continued blood supply and protect the attached pedicle. H Plasty Text: Given the location of the defect, shape of the defect and the proximity to free margins a H-plasty was deemed most appropriate for repair.  Using a sterile surgical marker, the appropriate advancement arms of the H-plasty were drawn incorporating the defect and placing the expected incisions within the relaxed skin tension lines where possible. The area thus outlined was incised deep to adipose tissue with a #15 scalpel blade. The skin margins were undermined to an appropriate distance in all directions utilizing iris scissors.  The opposing advancement arms were then advanced into place in opposite direction and anchored with interrupted buried subcutaneous sutures. Advancement Flap (Single) Text: The defect edges were debeveled with a #15 scalpel blade.  Given the location of the defect and the proximity to free margins a single advancement flap was deemed most appropriate.  Using a sterile surgical marker, an appropriate advancement flap was drawn incorporating the defect and placing the expected incisions within the relaxed skin tension lines where possible.    The area thus outlined was incised deep to adipose tissue with a #15 scalpel blade.  The skin margins were undermined to an appropriate distance in all directions utilizing iris scissors. O-L Flap Text: The defect edges were debeveled with a #15 scalpel blade.  Given the location of the defect, shape of the defect and the proximity to free margins an O-L flap was deemed most appropriate.  Using a sterile surgical marker, an appropriate advancement flap was drawn incorporating the defect and placing the expected incisions within the relaxed skin tension lines where possible.    The area thus outlined was incised deep to adipose tissue with a #15 scalpel blade.  The skin margins were undermined to an appropriate distance in all directions utilizing iris scissors. Consent Type: Consent 1 (Standard) Simple / Intermediate / Complex Repair - Final Wound Length In Cm: 4.5 Localized Dermabrasion With Wire Brush Text: The patient was draped in routine manner.  Localized dermabrasion using 3 x 17 mm wire brush was performed in routine manner to papillary dermis. This spot dermabrasion is being performed to complete skin cancer reconstruction. It also will eliminate the other sun damaged precancerous cells that are known to be part of the regional effect of a lifetime's worth of sun exposure. This localized dermabrasion is therapeutic and should not be considered cosmetic in any regard. Repair Type: Complex Repair Secondary Intention Text (Leave Blank If You Do Not Want): The defect will heal with secondary intention.

## 2021-05-12 ENCOUNTER — APPOINTMENT (OUTPATIENT)
Dept: CV DIAGNOSITCS | Facility: HOSPITAL | Age: 71
End: 2021-05-12

## 2021-05-12 ENCOUNTER — APPOINTMENT (OUTPATIENT)
Dept: HEART FAILURE | Facility: CLINIC | Age: 71
End: 2021-05-12
Payer: MEDICAID

## 2021-05-12 VITALS
DIASTOLIC BLOOD PRESSURE: 93 MMHG | HEART RATE: 85 BPM | BODY MASS INDEX: 27.32 KG/M2 | WEIGHT: 164 LBS | OXYGEN SATURATION: 98 % | HEIGHT: 65 IN | SYSTOLIC BLOOD PRESSURE: 154 MMHG | TEMPERATURE: 98.2 F

## 2021-05-12 PROCEDURE — 99072 ADDL SUPL MATRL&STAF TM PHE: CPT

## 2021-05-12 PROCEDURE — 99215 OFFICE O/P EST HI 40 MIN: CPT

## 2021-05-12 NOTE — PHYSICAL EXAM
[General Appearance - Well Developed] : well developed [General Appearance - Well Nourished] : well nourished [Normal Conjunctiva] : the conjunctiva exhibited no abnormalities [] : no respiratory distress [Respiration, Rhythm And Depth] : normal respiratory rhythm and effort [Heart Rate And Rhythm] : heart rate and rhythm were normal [Heart Sounds] : normal S1 and S2 [Bowel Sounds] : normal bowel sounds [Abdomen Soft] : soft [Abdomen Tenderness] : non-tender [Abnormal Walk] : normal gait [Nail Clubbing] : no clubbing of the fingernails [Cyanosis, Localized] : no localized cyanosis [Impaired Insight] : insight and judgment were intact [Oriented To Time, Place, And Person] : oriented to person, place, and time [FreeTextEntry1] : 1/6 HSM at apex

## 2021-05-12 NOTE — HISTORY OF PRESENT ILLNESS
[FreeTextEntry1] : Referring: Dr. Jeremiah Riddle\par \par Mr. Torrez is a 70 YO M with a history of ACC/AHA Stage C NICM (LV 6.6 cm, LVEF 25-30%) with severe functional MR, history of poorly controlled HTN, and CKD III (baseline Cr 1.6) presenting to HF clinic for further management.\par \par His prior history is unclear, he denies having known history of heart failure but states he was following with Dr. Knapp at [facility] since ~2016. A review of prior noncardiac encounters reveals systolic blood pressures frequently in 170-180 mmHg range. \par \par He was admitted 3/2020 with dyspnea and found to have newly discovered LV dysfunction. R/LHC performed revealing no significant CAD, low filling pressures, and borderline cardiac output. He was found on this admission to also have severe MR though on FLORINDA it was quantified as moderate in severity. He has not had ED visits of hospitalizations since then. I sent for cardiac MRI which revealed nonspecific mesocardial LGE in the septum. \par \par At last visit he was doing well with NYHA I-II symptoms \par \par He presents today for followup. He usually checks his blood pressure at home and usually says is in the -130's systolic. Denies any dyspnea on exertion. Can climb 3-4 flights of stairs without difficulty. Denies orthopnea or lower extremity edema. Denies any dizziness or lightheadedness. Denies chest pain or chest pressure.

## 2021-05-12 NOTE — CARDIOLOGY SUMMARY
[de-identified] : \par Holter 9/2020: 5% PVC burden, HR  bpm, no NSVT\par  [de-identified] : \par EKG 9/21/20: SR, LVH with strain pattern  \par  [de-identified] : \par TTE 9/3/20: LV 6.6 cm, mild concentric LVH, LVEF 20-25%, LVOT VTI 12 cm, restrictive diastolic filling with E/A 3.3 and e' velocities in 3-5 range, normal RV size/function, severe mitral regurgitation, mild TR, mild-moderate AI\par  [de-identified] : \par Cardiac MRI 11/2020:  ml with elevated LV mas index of 95 g/m2, LVEF 22%, normal RV size/function, linear mesocardial LGE along septum and anterior wall c/w nonischemic cardiomyopathy  [de-identified] : \par [facility] 3/2020: minimal non-obstructive CAD\par [facility] 3/2020: RA 1, PA 43/14 (27), PCWP 18mmHg, LVEDP 15 mmHg, Lissa CO/CI 3.8/2.2\par \par

## 2021-05-12 NOTE — DISCUSSION/SUMMARY
[FreeTextEntry1] : # ACC/AHA Stage C NICM\par - Etiology: suspect hypertensive cardiomyopathy, no signs of infiltrative disease on cMRI\par - GDMT: current regimen is carvedilol 12.5 mg BID, entresto  mg BID, spironolactone 25 mg daily. hydralazine 25 mg TID, and isordil 20 mg TID. will increase carvedilol to 25 mg BID. no indication for SGL2i given NYHA 1 symptoms for HF but will consider for CKD. asked to bring meds to next visit \par - Diuretic: euvolemic off diuretics \par - Device: will reassess LVEF for ICD candidacy. however he has seen Dr. Duncan from EP and patient himself was reluctant to proceed. missed TTE appointment today so will reschedule \par - Labs: check today\par \par # LIZBETH on CKD\par - baseline Cr 1.6 and had likely hypovolemic LIZBETH back 2/2021 but he did not get repeat labs\par - repeat labs today\par - will consider Farxiga next visit if renal function stabilizes \par \par # Severe functional MR\par - will reassess on TTE, missed echo appointment today \par \par # HTN\par - controlling with GDMT as above \par \par RTC in 3 months, TTE beforehand \par

## 2021-05-29 LAB
ALBUMIN SERPL ELPH-MCNC: 4 G/DL
ALP BLD-CCNC: 84 U/L
ALT SERPL-CCNC: 12 U/L
ANION GAP SERPL CALC-SCNC: 9 MMOL/L
AST SERPL-CCNC: 23 U/L
BILIRUB SERPL-MCNC: 0.7 MG/DL
BUN SERPL-MCNC: 18 MG/DL
CALCIUM SERPL-MCNC: 9 MG/DL
CHLORIDE SERPL-SCNC: 107 MMOL/L
CO2 SERPL-SCNC: 21 MMOL/L
CREAT SERPL-MCNC: 1.86 MG/DL
GLUCOSE SERPL-MCNC: 128 MG/DL
NT-PROBNP SERPL-MCNC: 65 PG/ML
POTASSIUM SERPL-SCNC: 4.5 MMOL/L
PROT SERPL-MCNC: 7.2 G/DL
SODIUM SERPL-SCNC: 137 MMOL/L

## 2021-06-01 NOTE — PATIENT PROFILE ADULT - PACKS PER DAY
0 Ear Star Wedge Flap Text: The defect edges were debeveled with a #15 blade scalpel.  Given the location of the defect and the proximity to free margins (helical rim) an ear star wedge flap was deemed most appropriate.  Using a sterile surgical marker, the appropriate flap was drawn incorporating the defect and placing the expected incisions between the helical rim and antihelix where possible.  The area thus outlined was incised through and through with a #15 scalpel blade.

## 2021-06-29 ENCOUNTER — APPOINTMENT (OUTPATIENT)
Dept: CV DIAGNOSITCS | Facility: HOSPITAL | Age: 71
End: 2021-06-29

## 2021-06-29 ENCOUNTER — OUTPATIENT (OUTPATIENT)
Dept: OUTPATIENT SERVICES | Facility: HOSPITAL | Age: 71
LOS: 1 days | End: 2021-06-29
Payer: MEDICAID

## 2021-06-29 DIAGNOSIS — I50.22 CHRONIC SYSTOLIC (CONGESTIVE) HEART FAILURE: ICD-10-CM

## 2021-06-29 PROCEDURE — 93306 TTE W/DOPPLER COMPLETE: CPT

## 2021-06-29 PROCEDURE — 93306 TTE W/DOPPLER COMPLETE: CPT | Mod: 26

## 2021-07-02 ENCOUNTER — NON-APPOINTMENT (OUTPATIENT)
Age: 71
End: 2021-07-02

## 2021-07-06 ENCOUNTER — RX RENEWAL (OUTPATIENT)
Age: 71
End: 2021-07-06

## 2021-09-28 NOTE — PATIENT PROFILE ADULT. - SPIRITUAL CULTURAL, RELIGIOUS PRACTICES/VALUES, PROFILE
Pls call pt  The extra views and u/s on R show no abnormality - the screening mammo abnormality was superimposed breast tissue  Just needs screening mammo in a year. 
None stated at this time

## 2021-11-17 ENCOUNTER — APPOINTMENT (OUTPATIENT)
Dept: HEART FAILURE | Facility: CLINIC | Age: 71
End: 2021-11-17

## 2021-12-06 ENCOUNTER — LABORATORY RESULT (OUTPATIENT)
Age: 71
End: 2021-12-06

## 2021-12-06 ENCOUNTER — APPOINTMENT (OUTPATIENT)
Dept: HEART FAILURE | Facility: CLINIC | Age: 71
End: 2021-12-06
Payer: MEDICAID

## 2021-12-06 VITALS
OXYGEN SATURATION: 99 % | WEIGHT: 166 LBS | TEMPERATURE: 98.2 F | BODY MASS INDEX: 27.66 KG/M2 | HEART RATE: 98 BPM | HEIGHT: 65 IN | RESPIRATION RATE: 16 BRPM | SYSTOLIC BLOOD PRESSURE: 160 MMHG | DIASTOLIC BLOOD PRESSURE: 100 MMHG

## 2021-12-06 PROCEDURE — 99214 OFFICE O/P EST MOD 30 MIN: CPT

## 2021-12-06 RX ORDER — HYDRALAZINE HYDROCHLORIDE 25 MG/1
25 TABLET ORAL 3 TIMES DAILY
Qty: 90 | Refills: 0 | Status: DISCONTINUED | COMMUNITY
Start: 2020-09-02 | End: 2021-12-06

## 2021-12-06 RX ORDER — SPIRONOLACTONE 25 MG/1
25 TABLET ORAL DAILY
Qty: 30 | Refills: 3 | Status: DISCONTINUED | COMMUNITY
Start: 2020-12-09 | End: 2021-12-06

## 2021-12-06 RX ORDER — ISOSORBIDE DINITRATE 20 MG/1
20 TABLET ORAL EVERY 8 HOURS
Qty: 90 | Refills: 3 | Status: DISCONTINUED | COMMUNITY
Start: 2021-02-10 | End: 2021-12-06

## 2021-12-06 NOTE — PHYSICAL EXAM
[Well Developed] : well developed [Normal S1, S2] : normal S1, S2 [Clear Lung Fields] : clear lung fields [Normal Gait] : normal gait [Alert and Oriented] : alert and oriented [Well Nourished] : well nourished [No Acute Distress] : no acute distress [No Murmur] : no murmur [No Rub] : no rub [No Gallop] : no gallop [No Respiratory Distress] : no respiratory distress  [Soft] : abdomen soft [Non Tender] : non-tender [No Edema] : no edema [Normal Radial B/L] : normal radial B/L [Moves all extremities] : moves all extremities [No Focal Deficits] : no focal deficits [Normal Speech] : normal speech [Normal memory] : normal memory [de-identified] : JVP 6-8 cm H2O, no HJR [de-identified] : Warm peripherally

## 2021-12-06 NOTE — DISCUSSION/SUMMARY
[FreeTextEntry1] : # ACC/AHA Stage C NICM\par - Etiology: suspect hypertensive cardiomyopathy, no signs of infiltrative disease on cMRI\par - GDMT: unclear how long he has not taken his GDMT; will re-start Coreg at 12.5 mg BID and Entresto at 49/51mg BID (previously he was tolerating carvedilol 25 mg BID, Entresto  mg BID, spironolactone 25 mg daily. hydralazine 25 mg TID, and isordil 20 mg TID). Asked that he start keeping a BP and weight log. Eventual MRA/SGLT2i.\par - Diuretic: euvolemic off diuretics \par - Device: repeat TTE on 6/29 reveal that overall LV dimensions have reduced and LVEF has improved. There is no indication for ICD at this time.\par - Labs: repeat today showed stable renal function and K 4.2, will repeat a BMP 7-10 days after starting Entresto\par \par # CKD\par - renal function stable\par - will consider Farxiga as above\par \par # Severe functional MR\par -  repeat TTE mild-mod MR, will optimize GDMT as above\par \par # HTN\par - controlling with GDMT as above \par \par RTC every 2-3 weeks with the HF NP alternating with Telehealth and clinic visits for medication uptitration, and with Dr. Sparks at his next available appointment.

## 2021-12-06 NOTE — CARDIOLOGY SUMMARY
[de-identified] : \par EKG 9/21/20: SR, LVH with strain pattern  \par  [de-identified] : \par Holter 9/2020: 5% PVC burden, HR  bpm, no NSVT\par  [de-identified] : \par TTE 6/29/2021: LV 5.4 cm, LVEF 35-40% with mild global hypokinesis, mild-moderate MR, mild AI, normal RV size/function, IVC small/collapsing, estimated PASP 18 mmHg\par \par TTE 9/3/20: LV 6.6 cm, mild concentric LVH, LVEF 20-25%, LVOT VTI 12 cm, restrictive diastolic filling with E/A 3.3 and e' velocities in 3-5 range, normal RV size/function, severe mitral regurgitation, mild TR, mild-moderate AI\par  [de-identified] : \par Cardiac MRI 11/2020:  ml with elevated LV mas index of 95 g/m2, LVEF 22%, normal RV size/function, linear mesocardial LGE along septum and anterior wall c/w nonischemic cardiomyopathy  [de-identified] : \par [facility] 3/2020: minimal non-obstructive CAD\par RH 3/2020: RA 1, PA 43/14 (27), PCWP 18mmHg, LVEDP 15 mmHg, Lissa CO/CI 3.8/2.2\par

## 2021-12-06 NOTE — HISTORY OF PRESENT ILLNESS
[FreeTextEntry1] : Referring: Dr. Jeremiah Riddle\par \par Mr. Torrez is a 72 YO M with a history of ACC/AHA Stage C NICM (LV 6.6 cm, LVEF 25-30%) with severe functional MR, history of poorly controlled HTN, and CKD III (baseline Cr 1.6) presenting to HF clinic for further management.\par \par His prior history is unclear, he denies having known history of heart failure but states he was following with Dr. Knapp at [facility] since ~2016. A review of prior noncardiac encounters reveals systolic blood pressures frequently in 170-180 mmHg range. \par \par He was admitted 3/2020 with dyspnea and found to have newly discovered LV dysfunction. R/LHC performed revealing no significant CAD, low filling pressures, and borderline cardiac output. He was found on this admission to also have severe MR though on FLORINDA it was quantified as moderate in severity. He has not had ED visits of hospitalizations since then. I sent for cardiac MRI which revealed nonspecific mesocardial LGE in the septum. \par \par At last visit he was doing well with NYHA I-II symptoms \par \par He presents today for an acute episode of SOB this past Saturday (12/4) while walking about 1 block [place] the [place] work, which prompted him to come to clinic today. He states he ran out of his medications "a few weeks ago" and did not have the number to call the clinic. He does not check his BP regularly, but states his SBP is generally 150-160s and his weight has been "stable." He states he missed his most recent follow up appointment because he wasn’t aware and was unable to travel in time to make it. Of note, he travels to all his clinic appointments via taxi which cost him about $50 one way as he does not have family that is able to provide transportation. He denies CP, palpitations, syncope, LH/dizziness, orthopnea, abdominal discomfort, and LE edema.

## 2021-12-21 ENCOUNTER — APPOINTMENT (OUTPATIENT)
Dept: HEART FAILURE | Facility: CLINIC | Age: 71
End: 2021-12-21
Payer: MEDICAID

## 2021-12-21 VITALS
OXYGEN SATURATION: 98 % | HEIGHT: 65 IN | TEMPERATURE: 98.1 F | BODY MASS INDEX: 26.66 KG/M2 | SYSTOLIC BLOOD PRESSURE: 124 MMHG | DIASTOLIC BLOOD PRESSURE: 82 MMHG | HEART RATE: 71 BPM | RESPIRATION RATE: 20 BRPM | WEIGHT: 160 LBS

## 2021-12-21 PROCEDURE — 99214 OFFICE O/P EST MOD 30 MIN: CPT

## 2021-12-22 LAB
ANION GAP SERPL CALC-SCNC: 14 MMOL/L
BUN SERPL-MCNC: 15 MG/DL
CALCIUM SERPL-MCNC: 9.4 MG/DL
CHLORIDE SERPL-SCNC: 104 MMOL/L
CO2 SERPL-SCNC: 23 MMOL/L
CREAT SERPL-MCNC: 1.66 MG/DL
GLUCOSE SERPL-MCNC: 88 MG/DL
NT-PROBNP SERPL-MCNC: 790 PG/ML
POTASSIUM SERPL-SCNC: 4.3 MMOL/L
SODIUM SERPL-SCNC: 141 MMOL/L

## 2021-12-30 NOTE — PHYSICAL EXAM
[Well Developed] : well developed [Well Nourished] : well nourished [No Acute Distress] : no acute distress [Normal S1, S2] : normal S1, S2 [No Murmur] : no murmur [No Rub] : no rub [No Gallop] : no gallop [Clear Lung Fields] : clear lung fields [No Respiratory Distress] : no respiratory distress  [Soft] : abdomen soft [Non Tender] : non-tender [Normal Gait] : normal gait [No Edema] : no edema [Normal Radial B/L] : normal radial B/L [Moves all extremities] : moves all extremities [No Focal Deficits] : no focal deficits [Normal Speech] : normal speech [Alert and Oriented] : alert and oriented [Normal memory] : normal memory [de-identified] : JVP 6-8 cm H2O, no HJR [de-identified] : Warm peripherally

## 2021-12-30 NOTE — HISTORY OF PRESENT ILLNESS
[FreeTextEntry1] : Referring: Dr. Jeremiah Riddle\par \par Mr. Torrez is a 70 YO M with a history of ACC/AHA Stage C NICM (LV 6.6 cm, LVEF 25-30%) with severe functional MR, history of poorly controlled HTN, and CKD III (baseline Cr 1.6) presenting to HF clinic for further management.\par \par His prior history is unclear, he denies having known history of heart failure but states he was following with Dr. Knapp at [facility] since ~2016. A review of prior noncardiac encounters reveals systolic blood pressures frequently in 170-180 mmHg range. \par \par He was admitted 3/2020 with dyspnea and found to have newly discovered LV dysfunction. R/LHC performed revealing no significant CAD, low filling pressures, and borderline cardiac output. He was found on this admission to also have severe MR though on FLORINDA it was quantified as moderate in severity. He has not had ED visits of hospitalizations since then. I sent for cardiac MRI which revealed nonspecific mesocardial LGE in the septum. \par \par At last visit he was doing well with NYHA I-II symptoms \par \par He presents to clinic today for follow up. 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 He denies orthopnea, No PND. He reports his home weights have ranged from 164- 166lbs and his home SBP readings have ranged from 128-139. He has not typically  recorded his HR.He reports his appetite is fair and does not reports symptoms of early satiety. He has been taking his medications and is tolerating them well.\par He denies CP, palpitations, syncope, LH/dizziness, abdominal discomfort, and LE edema. He has been limiting fluid and sodium in his diet and taking his medications as directed. He does not use NSAIDs. \par \par He received notice to day that the health system is no longer accepting his live insurance coverage. We've discussed the importance of follow up and he and his niece will follow up with potential alternative to coverage options with insurance provider.

## 2021-12-30 NOTE — CARDIOLOGY SUMMARY
[de-identified] : \par EKG 9/21/20: SR, LVH with strain pattern  \par  [de-identified] : \par Holter 9/2020: 5% PVC burden, HR  bpm, no NSVT\par  [de-identified] : \par TTE 6/29/2021: LV 5.4 cm, LVEF 35-40% with mild global hypokinesis, mild-moderate MR, mild AI, normal RV size/function, IVC small/collapsing, estimated PASP 18 mmHg\par \par TTE 9/3/20: LV 6.6 cm, mild concentric LVH, LVEF 20-25%, LVOT VTI 12 cm, restrictive diastolic filling with E/A 3.3 and e' velocities in 3-5 range, normal RV size/function, severe mitral regurgitation, mild TR, mild-moderate AI\par  [de-identified] : \par Cardiac MRI 11/2020:  ml with elevated LV mas index of 95 g/m2, LVEF 22%, normal RV size/function, linear mesocardial LGE along septum and anterior wall c/w nonischemic cardiomyopathy  [de-identified] : \par [facility] 3/2020: minimal non-obstructive CAD\par RH 3/2020: RA 1, PA 43/14 (27), PCWP 18mmHg, LVEDP 15 mmHg, Lissa CO/CI 3.8/2.2\par

## 2021-12-30 NOTE — DISCUSSION/SUMMARY
[FreeTextEntry1] : # ACC/AHA Stage C NICM\par - Etiology: suspect hypertensive cardiomyopathy, no signs of infiltrative disease on cMRI\par - GDMT:   Will increase Entresto to 97/103mg BID: labs today reveal stable Scr of 1.66 from 1.61. Will have him increase Coreg to 25 mg BID in on week if SBP >100; he will call clinic to review BP readings for uptitration of BB; (previously he was tolerating carvedilol 25 mg BID,  Entresto to  mg BID, spironolactone 25 mg daily. hydralazine 25 mg TID, and isordil 20 mg TID). Eventual MRA/SGLT2i.\par - Diuretic: euvolemic off diuretics \par - Device: repeat TTE on 6/29 reveal that overall LV dimensions have reduced and LVEF has improved. There is no indication for ICD at this time.\par - Labs: repeat today showed stable renal function and K 4.3, will repeat a BMP 7-10 days after uptitration of  ARNI\par \par # CKD\par - renal function stable\par - will consider Farxiga as above\par \par # Severe functional MR\par -  repeat TTE mild-mod MR, will optimize GDMT as above\par \par # HTN\par - controlling with GDMT as above \par \par RTC he will determine coverage, in meantime will provide Q2 week telehealth Follow up for medication uptitration with HF NP and Next  available with Dr. Sparks.

## 2021-12-30 NOTE — ASSESSMENT
[FreeTextEntry1] : 70 YO M with a history of ACC/AHA Stage C NICM (LV 6.6 cm, LVEF 25-30%) with severe functional MR, history of poorly controlled HTN, and CKD III (baseline Cr 1.6) presenting to HF clinic for further management. He is currently euvolemic, NYHA class IIIa, and normotensive with room for further up titration of GDMT

## 2022-03-07 ENCOUNTER — APPOINTMENT (OUTPATIENT)
Dept: HEART FAILURE | Facility: CLINIC | Age: 72
End: 2022-03-07
Payer: MEDICAID

## 2022-03-07 VITALS
SYSTOLIC BLOOD PRESSURE: 144 MMHG | DIASTOLIC BLOOD PRESSURE: 68 MMHG | HEIGHT: 65 IN | TEMPERATURE: 98.2 F | WEIGHT: 162 LBS | RESPIRATION RATE: 16 BRPM | OXYGEN SATURATION: 98 % | HEART RATE: 71 BPM | BODY MASS INDEX: 26.99 KG/M2

## 2022-03-07 PROCEDURE — 99214 OFFICE O/P EST MOD 30 MIN: CPT

## 2022-03-08 ENCOUNTER — NON-APPOINTMENT (OUTPATIENT)
Age: 72
End: 2022-03-08

## 2022-03-08 LAB
ANION GAP SERPL CALC-SCNC: 12 MMOL/L
BUN SERPL-MCNC: 10 MG/DL
CALCIUM SERPL-MCNC: 9.5 MG/DL
CHLORIDE SERPL-SCNC: 105 MMOL/L
CO2 SERPL-SCNC: 20 MMOL/L
CREAT SERPL-MCNC: 1.43 MG/DL
EGFR: 52 ML/MIN/1.73M2
GLUCOSE SERPL-MCNC: 113 MG/DL
NT-PROBNP SERPL-MCNC: 396 PG/ML
POTASSIUM SERPL-SCNC: 4 MMOL/L
SODIUM SERPL-SCNC: 137 MMOL/L

## 2022-03-08 NOTE — PHYSICAL EXAM
[Well Developed] : well developed [Well Nourished] : well nourished [No Acute Distress] : no acute distress [Normal S1, S2] : normal S1, S2 [No Murmur] : no murmur [No Rub] : no rub [No Gallop] : no gallop [Clear Lung Fields] : clear lung fields [No Respiratory Distress] : no respiratory distress  [Soft] : abdomen soft [Non Tender] : non-tender [Normal Gait] : normal gait [No Edema] : no edema [Normal Radial B/L] : normal radial B/L [Moves all extremities] : moves all extremities [No Focal Deficits] : no focal deficits [Normal Speech] : normal speech [Alert and Oriented] : alert and oriented [Normal memory] : normal memory [de-identified] : JVP 6 cm H2O, no HJR [de-identified] : Warm peripherally

## 2022-03-08 NOTE — CARDIOLOGY SUMMARY
[de-identified] : \par EKG 9/21/20: SR, LVH with strain pattern  \par  [de-identified] : \par Holter 9/2020: 5% PVC burden, HR  bpm, no NSVT\par  [de-identified] : \par TTE 6/29/2021: LV 5.4 cm, LVEF 35-40% with mild global hypokinesis, mild-moderate MR, mild AI, normal RV size/function, IVC small/collapsing, estimated PASP 18 mmHg\par \par TTE 9/3/20: LV 6.6 cm, mild concentric LVH, LVEF 20-25%, LVOT VTI 12 cm, restrictive diastolic filling with E/A 3.3 and e' velocities in 3-5 range, normal RV size/function, severe mitral regurgitation, mild TR, mild-moderate AI\par  [de-identified] : \par Cardiac MRI 11/2020:  ml with elevated LV mas index of 95 g/m2, LVEF 22%, normal RV size/function, linear mesocardial LGE along septum and anterior wall c/w nonischemic cardiomyopathy  [de-identified] : \par [facility] 3/2020: minimal non-obstructive CAD\par RH 3/2020: RA 1, PA 43/14 (27), PCWP 18mmHg, LVEDP 15 mmHg, Lissa CO/CI 3.8/2.2\par

## 2022-03-08 NOTE — HISTORY OF PRESENT ILLNESS
[FreeTextEntry1] : Referring: Dr. Jeremiah Riddle\par \par Mr. Torrez is a 72 YO M with a history of ACC/AHA Stage C NICM (LV 6.6 cm, LVEF 35-40% with GDMT from 25-30% ) with severe functional MR, history of poorly controlled HTN, and CKD III (baseline Cr 1.6) presenting to HF clinic for further management.\par \par His prior history is unclear, he denies having known history of heart failure but states he was following with Dr. Knapp at [facility] since ~2016. A review of prior noncardiac encounters reveals systolic blood pressures frequently in 170-180 mmHg range. \par \par He was admitted 3/2020 with dyspnea and found to have newly discovered LV dysfunction. R/LHC performed revealing no significant CAD, low filling pressures, and borderline cardiac output. He was found on this admission to also have severe MR though on FLORINDA it was quantified as moderate in severity. He has not had ED visits of hospitalizations since then. I sent for cardiac MRI which revealed nonspecific mesocardial LGE in the septum. \par \par He presents today for routine follow-up. He was last seen in December where Entresto and Coreg were both maximized again following a period of time prior he was off GDMT due to running out of medications. He reports feeling back to his baseline since then. Able to walk >15 blocks without stopping and has no difficulty with climbing 2 flights of stairs. Home BP generally 130/70s. He denies LH/dizziness, CP, palpitations, orthopnea, PND, ABD discomfort and LE swelling. Per Niece Maddy, she feels he has grown forgetful so has been managing his medications. \par

## 2022-03-08 NOTE — DISCUSSION/SUMMARY
[FreeTextEntry1] : # ACC/AHA Stage C NICM\par - Etiology: suspect hypertensive cardiomyopathy, no signs of infiltrative disease on cMRI\par - GDMT: current regimen is Entresto  BID and Coreg 25 mg BID. Resume Spironolactone at 12.5 mg QD and HDZN 25 mg TID. (previously tolerating ISDN 20 mg TID as well). No indication for SGL2i given NYHA 1 symptoms for HF but will consider for CKD at next visit. His niece will be managing his medications. \par - Diuretic: euvolemic off diuretics \par - Device: repeat TTE on 6/29/21 overall LV dimensions have reduced and LVEF has improved. There is no indication for ICD at this time.\par - Labs: today with K 4, Cr 1.4 and pro-BNP is gradually downtrending since resumption of GDMT (now 396 from 790 and 1375). \par \par # CKD\par - renal function stable\par - will consider Farxiga at next visit as above\par \par # Severe functional MR\par -  repeat TTE 6/2021 improved to mild-mod MR, will optimize GDMT as above\par \par # HTN\par - controlling with GDMT as above \par \par He finds travel to [facility] to continue follow-up with Dr. Sparks difficult. As such, follow-up with Dr. Charles at [facility] location (given geographical ease with public transportation) in 2-3 months. \par

## 2022-03-08 NOTE — ASSESSMENT
[FreeTextEntry1] : 72 YO M with a history of ACC/AHA Stage C NICM (LV 6.6 cm, LVEF improved to 35-40% from 25-30%) with severe functional MR, history of poorly controlled HTN, and CKD III (baseline Cr 1.6) presenting to HF clinic for further management. He is currently euvolemic with improvement of NYHA class back to baseline I following resumption of GDMT. He has room for further uptitration.

## 2022-03-09 NOTE — ED ADULT TRIAGE NOTE - ESI TRIAGE ACUITY LEVEL, MLM
Caller: Brandy El    Relationship: Self    Best call back number: [phone]     Requested Prescriptions:       clonazePAM (KlonoPIN) 0.5 MG tablet         Pharmacy where request should be sent:    [facility] [address] [place] [address] RD AT [facility] - [phone]  - [phone]   [phone]  Additional details provided by patient: PATIENT IS CALLING TO REQUEST A NEW 90 DAY PRESCRIPTION WITH REFILLS OF THE ABOVE MEDICATION.    Does the patient have less than a 3 day supply:  [x] Yes  [] No    Denita Martinez, AlvinoSched Rep   03/09/22 12:18 EST         PLEASE ADVISE.     3

## 2022-03-15 LAB
ANION GAP SERPL CALC-SCNC: 11 MMOL/L
BUN SERPL-MCNC: 17 MG/DL
CALCIUM SERPL-MCNC: 9.8 MG/DL
CHLORIDE SERPL-SCNC: 107 MMOL/L
CO2 SERPL-SCNC: 24 MMOL/L
CREAT SERPL-MCNC: 1.56 MG/DL
EGFR: 47 ML/MIN/1.73M2
GLUCOSE SERPL-MCNC: 106 MG/DL
POTASSIUM SERPL-SCNC: 4.4 MMOL/L
SODIUM SERPL-SCNC: 141 MMOL/L

## 2022-05-07 NOTE — PATIENT PROFILE ADULT - BRADEN MOISTURE
[address] [facility] Internal Medicine  Gal Sanders MD; Merline Meng MD; Halie Enriquez MD; Lex Cowden, MD Reeves Saddler, MD; MD YANET DelgadoSaint [facility]   [facility]    HISTORY AND PHYSICAL EXAMINATION            Date:   5/7/2022  Patient name:  Cole Barroso  Date of admission:  5/5/2022  8:35 AM  MRN:   [id number]  Account:  [de-identified]  YOB: 1949  PCP:    Anton Melgar MD  Room:   [de-identified]  Code Status:    Full Code    Chief Complaint:     Chief Complaint   Patient presents with    Shortness of Breath   dyspnea    History Obtained From:     Medical record and nursing staff  Pt is intubated sedated    History of Present Illness:     Cole Barroso is a 75 y.o. Non- / non  male who presents with Shortness of Breath   and is admitted to the hospital for the management of Respiratory failure ([facility] [address]     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 well-known to pulmonary team admitted with increasing cough shortness of breath for 3 to 4 days chest x-ray shows bilateral infiltrate elevated procalcitonin elevated leukocytosis patient was tested positive for COVID-19 pneumonia on May 5  Acute respiratory failure secondary to ALS with multifocal COVID-19 pneumonia intubated and sedated treated in the intensive care unit  5/7   Patient, still intubated, sedated with fentanyl propofol  On tube feeds  Had reading of low blood pressure, patient given 3.5 L of fluid bolus  On Levophed  Past Medical History:     Past Medical History:   Diagnosis Date    ALS (amyotrophic lateral sclerosis) ([address]     Aortic root aneurysm ([address] 11/08/2016    Aortic valve replaced 11/08/2016    Hypertension         Past Surgical History:     Past Surgical History:   Procedure Laterality Date    CARDIOVERSION  06/27/2020    w/    HERNIA REPAIR      PARATHYROID GLAND SURGERY 1980        Medications Prior to Admission:     Prior to Admission medications    Medication Sig Start Date End Date Taking? Authorizing Provider   neomycin-bacitracin-polymyxin (NEOSPORIN) 3.5-400-5000 OINT ointment Apply topically 3 times daily Apply topically 4 times daily. 4/4/22   Jean Lakhani MD   Handicap Placard MISC by Does not apply route Dx: ALS    Duration: 5 years 3/23/22   Jean Lakhani MD   medical marijuana Place under the tongue 2 times daily. Historical Provider, MD   acetaminophen (TYLENOL) 325 MG tablet Take 650 mg by mouth every 6 hours as needed 9/25/20   Historical Provider, MD   fluticasone [facility]) 50 MCG/ACT nasal spray 1 spray by Nasal route daily 6/8/21   Jean Lakhani MD   SPIRULINA PO Take by mouth    Historical Provider, MD   Vitamin E 100 UNITS TABS Take 60 Units by mouth daily    Historical Provider, MD   SELENIUM PO Take 75 mcg by mouth daily    Historical Provider, MD   SOYA LECITHIN PO Take 1,000 mg by mouth daily    Historical Provider, MD   Multiple Vitamins-Minerals (THERAPEUTIC MULTIVITAMIN-MINERALS) tablet Take 1 tablet by mouth daily    Historical Provider, MD   OMEGA-3 FATTY ACIDS-VITAMIN E PO Take 1 tablet by mouth daily    Historical Provider, MD   Probiotic Product (PROBIOTIC PO) Take 1 tablet by mouth daily    Historical Provider, MD   Ascorbic Acid (VITAMIN C) 500 MG tablet Take 120 mg by mouth daily    Historical Provider, MD        Allergies:     Ciprofloxacin, Lasix [furosemide], Penicillins, Sulfa antibiotics, and Duloxetine    Social History:     Tobacco:    reports that he has never smoked. He has never used smokeless tobacco.  Alcohol:      reports current alcohol use of about 1.0 standard drink of alcohol per week. Drug Use:  reports no history of drug use.     Family History:     Family History   Problem Relation Age of Onset    Lung Cancer Mother     Heart Disease Father        Review of Systems:     Unable to get ros  Pt untubated and Rate 20     Total Rate 20          FIO2 40     Peep/Cpap 7     Text for Respiratory RESULTS TO RN    CBC    Collection Time: 05/07/22  5:07 AM   Result Value Ref Range    WBC 8.5 3.5 - 11.0 k/uL    RBC 3.64 (L) 4.5 - 5.9 m/uL    Hemoglobin 11.9 (L) 13.5 - 17.5 g/dL    Hematocrit 35.5 (L) 41 - 53 %    MCV 97.4 80 - 100 fL    MCH 32.8 26 - 34 pg    MCHC 33.7 31 - 37 g/dL    RDW 13.3 11.5 - 14.9 %    Platelets 656 146 - 510 k/uL    MPV 6.9 6.0 - 12.0 fL   Basic Metabolic Panel w/ Reflex to MG    Collection Time: 05/07/22  5:07 AM   Result Value Ref Range    Glucose 259 (H) 70 - 99 mg/dL    BUN 13 8 - 23 mg/dL    CREATININE <0.40 (L) 0.70 - 1.20 mg/dL    Calcium 8.5 (L) 8.6 - 10.4 mg/dL    Sodium 135 135 - 144 mmol/L    Potassium 3.5 (L) 3.7 - 5.3 mmol/L    Chloride 99 98 - 107 mmol/L    CO2 28 20 - 31 mmol/L    Anion Gap 8 (L) 9 - 17 mmol/L    GFR Non-African American Can not be calculated >60 mL/min    GFR  Can not be calculated >60 mL/min    GFR Comment         Magnesium    Collection Time: 05/07/22  5:07 AM   Result Value Ref Range    Magnesium 1.8 1.6 - 2.6 mg/dL   Procalcitonin    Collection Time: 05/07/22  5:07 AM   Result Value Ref Range    Procalcitonin 6.54 (H) <0.09 ng/mL   Triglyceride    Collection Time: 05/07/22  5:07 AM   Result Value Ref Range    Triglycerides 128 <150 mg/dL   Calcium, Ionized    Collection Time: 05/07/22  7:31 AM   Result Value Ref Range    Calcium, Ion 1.10 (L) 1.13 - 1.33 mmol/L       Imaging/Diagnostics:  XR CHEST PORTABLE    Result Date: 5/6/2022  1. New right arm PICC extending toward SVC, but distal tip is obscured by overlying pacemaker leads. 2.  Endotracheal tube and enteric tube remain in place. 3.  Unchanged right basilar consolidation and small right pleural effusion. Mild patchy left basilar opacities.      XR CHEST PORTABLE    Result Date: 5/5/2022  Endotracheal tube in satisfactory position above the chance NG tip and side-port in gastric fundus Otherwise, unchanged chest demonstrating bibasilar opacities suggesting atelectasis or infection     XR CHEST PORTABLE    Result Date: 5/5/2022  Bibasilar opacities with blunting of right costophrenic angle possibly combination of pneumonia and small right pleural effusion. There is what appears to be a left chest tube terminating in the periphery of the upper lung partly obscured by pacemaker electrode. Please correlate clinically. No recent comparisons. Assessment :      Hospital Problems           Last Modified POA    * (Principal) Respiratory failure ([address] 5/5/2022 Yes          Plan:     28-year-old gentleman with a history of ALS  Admitted for multifocal COVID-19 pneumonia with acute respiratory failure, reviewed recent chest x-ray  Possible superimposed bacterial pneumonia with underlying neuromuscular disorder of ALS, Pro-Luis is high  Acute respiratory failure secondary to ALS plus COVID-19 pneumonia intubated sedated  On IV Rocephin and Zithromax for superimposed infection  For COVID-19 patient received Actemra, on Decadron  Hypotension, received 3.5 L, on Levophed  History of coronary artery disease s/p CABG,  On Protonix for gastric prophylaxis  Hypokalemia, will replace potassium  DVT prophylaxis Lovenox  Patient critically sick, high chance of clinical deterioration, seen in ICU  CC time 32 minutes  Shanda Abbott MD  5/7/2022  5:39 PM    Copy sent to Dr. Jose Vazquez MD    Please note that this chart was generated using voice recognition Dragon dictation software. Although every effort was made to ensure the accuracy of this automated transcription, some errors in transcription may have occurred. (4) rarely moist

## 2022-06-08 ENCOUNTER — RESULT CHARGE (OUTPATIENT)
Age: 72
End: 2022-06-08

## 2022-06-10 ENCOUNTER — APPOINTMENT (OUTPATIENT)
Dept: CARDIOLOGY | Facility: CLINIC | Age: 72
End: 2022-06-10
Payer: MEDICAID

## 2022-06-10 ENCOUNTER — NON-APPOINTMENT (OUTPATIENT)
Age: 72
End: 2022-06-10

## 2022-06-10 VITALS
OXYGEN SATURATION: 99 % | BODY MASS INDEX: 27.29 KG/M2 | SYSTOLIC BLOOD PRESSURE: 157 MMHG | HEIGHT: 65 IN | DIASTOLIC BLOOD PRESSURE: 94 MMHG | HEART RATE: 69 BPM

## 2022-06-10 VITALS — DIASTOLIC BLOOD PRESSURE: 85 MMHG | TEMPERATURE: 97.9 F | SYSTOLIC BLOOD PRESSURE: 152 MMHG

## 2022-06-10 VITALS — WEIGHT: 164 LBS | BODY MASS INDEX: 27.29 KG/M2

## 2022-06-10 PROCEDURE — 99214 OFFICE O/P EST MOD 30 MIN: CPT | Mod: 25

## 2022-06-10 PROCEDURE — 93000 ELECTROCARDIOGRAM COMPLETE: CPT

## 2022-06-10 PROCEDURE — 36415 COLL VENOUS BLD VENIPUNCTURE: CPT
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
## 2022-06-13 LAB — NT-PROBNP SERPL-MCNC: 635 PG/ML

## 2022-06-21 NOTE — HISTORY OF PRESENT ILLNESS
[FreeTextEntry1] : Mr. Torrez is a 73 y/o M with a history of ACC/AHA Stage C NICM (LV 6.6 cm, LVEF 35-40% with GDMT from 25-30% ) with severe functional MR, history of poorly controlled HTN, and CKD III (baseline Cr 1.6) presenting to HF clinic for further management. Referred by Dr. Riddle. Previously followed by Dr. Sparks. \par \par His prior history is unclear but states he was following with Dr. Knapp at [facility] since ~2016. \par \par He was admitted 3/2020 with dyspnea and found to have newly discovered LV dysfunction. R/LHC performed revealing no significant CAD, low filling pressures, and borderline cardiac output. He was found on this admission to also have severe MR though on FLORINDA it was quantified as moderate in severity. He has not had ED visits of hospitalizations since then. He had a cardiac MRI which revealed nonspecific mesocardial LGE in the septum. \par \par Since December 2021, he was restarted on heart failure medications after a lapse in medications due to running out of medications. He reports feeling back to his baseline since then.\par \par Patient is here for a follow up today. Last seen in office 3/7/22 and added marco 12.5 mg daily and hydralazine 25 mg tid. He has not had follow up labs. Follows with a PCP close to him but unsure of name. \par \par Able to walk >20 blocks without stopping and has no difficulty with climbing 2 flights of stairs. Home BP generally 116-130/70s and weight is 160 lbs. Office B/P today is 152/85 but he did not take any meds yet today. 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 He previously reports appendix surgery 1/2022 in [facility] and was out of work for 3 months. He denies LH/dizziness, CP, palpitations, orthopnea, PND, ABD discomfort and LE swelling. Pt does not have an ICD. \par \par Admits to some diet indiscretions, eats out at [employer] take out.  States he drinks a lot but thinks it is less than 2 L.\par He lives with his niece Maddy and she is managing his medications.

## 2022-06-21 NOTE — PHYSICAL EXAM
[Well Developed] : well developed [Well Nourished] : well nourished [No Acute Distress] : no acute distress [Normal S1, S2] : normal S1, S2 [No Murmur] : no murmur [No Rub] : no rub [No Gallop] : no gallop [Clear Lung Fields] : clear lung fields [No Respiratory Distress] : no respiratory distress  [Soft] : abdomen soft [Non Tender] : non-tender [Normal Gait] : normal gait [No Edema] : no edema [Normal Radial B/L] : normal radial B/L [Moves all extremities] : moves all extremities [No Focal Deficits] : no focal deficits [Normal Speech] : normal speech [Alert and Oriented] : alert and oriented [Normal memory] : normal memory [de-identified] : JVP 6 cm H2O, no HJR [de-identified] : Warm peripherally

## 2022-06-21 NOTE — REVIEW OF SYSTEMS
[FreeTextEntry2] : see HPI [FreeTextEntry1] : 14 points review of systems and is negative aside from what is mentioned in HPI

## 2022-06-21 NOTE — CARDIOLOGY SUMMARY
[de-identified] : \par 6/10/22 NSR 62, LVH with strain pattern\par \par 9/21/20: SR, LVH with strain pattern   [de-identified] : \par Holter 9/2020: 5% PVC burden, HR  bpm, no NSVT\par  [de-identified] : \par TTE 6/29/2021: LV 5.4 cm, LVEF 35-40% with mild global hypokinesis, mild-moderate MR, mild AI, normal RV size/function, IVC small/collapsing, estimated PASP 18 mmHg\par \par TTE 9/3/20: LV 6.6 cm, mild concentric LVH, LVEF 20-25%, LVOT VTI 12 cm, restrictive diastolic filling with E/A 3.3 and e' velocities in 3-5 range, normal RV size/function, severe mitral regurgitation, mild TR, mild-moderate AI\par  [de-identified] : \par Cardiac MRI 11/2020:  ml with elevated LV mas index of 95 g/m2, LVEF 22%, normal RV size/function, linear mesocardial LGE along septum and anterior wall c/w nonischemic cardiomyopathy  [de-identified] : \par [facility] 3/2020: minimal non-obstructive CAD\par RH 3/2020: RA 1, PA 43/14 (27), PCWP 18mmHg, LVEDP 15 mmHg, Lissa CO/CI 3.8/2.2\par

## 2022-06-21 NOTE — ASSESSMENT
[FreeTextEntry1] : 71 y/o M with a history of ACC/AHA Stage C NICM/HFimpEF (LV 6.6 cm, LVEF improved to 35-40% from 25-30%) with severe functional MR, history of poorly controlled HTN, and CKD III (baseline Cr 1.6) presenting to HF clinic for further management. He is currently euvolemic with improvement of NYHA class back to baseline. He has room for further up titration. \par \par # ACC/AHA Stage C NICM\par - Etiology: suspect hypertensive cardiomyopathy, no signs of infiltrative disease on cMRI\par - continue Entresto  BID\par - continue Coreg 25 mg BID.\par - continue spironolactone 12.5 mg QD\par - continue HDZN 25 mg TID. (previously tolerating ISDN 20 mg TID as well). \par - Diuretic: euvolemic off diuretics \par - Device: repeat TTE on 6/29/21 overall LV dimensions have reduced and LVEF has improved. There is no indication for ICD at this time.\par - labs done today; will start on Farxiga 10 mg daily \par \par # CKD - improved to 1.3 \par - renal function stable\par - will start Farxiga\par \par # Severe functional MR\par -  repeat TTE 6/2021 improved to mild-mod MR, will optimize GDMT as above\par - repeat TTE with next appt in 6 months\par \par # HTN\par - controlling with GDMT as above \par \par Follow-up with NP in 3 months then me in 6 months \par

## 2022-06-22 LAB
ALBUMIN SERPL ELPH-MCNC: 4.6 G/DL
ALP BLD-CCNC: 85 U/L
ALT SERPL-CCNC: 15 U/L
ANION GAP SERPL CALC-SCNC: 16 MMOL/L
AST SERPL-CCNC: 29 U/L
BILIRUB SERPL-MCNC: 1 MG/DL
BUN SERPL-MCNC: 11 MG/DL
CALCIUM SERPL-MCNC: 9.4 MG/DL
CHLORIDE SERPL-SCNC: 102 MMOL/L
CO2 SERPL-SCNC: 20 MMOL/L
CREAT SERPL-MCNC: 1.36 MG/DL
EGFR: 55 ML/MIN/1.73M2
POTASSIUM SERPL-SCNC: 3.6 MMOL/L
PROT SERPL-MCNC: 8.4 G/DL
SODIUM SERPL-SCNC: 137 MMOL/L

## 2022-09-14 ENCOUNTER — APPOINTMENT (OUTPATIENT)
Dept: CARDIOLOGY | Facility: CLINIC | Age: 72
End: 2022-09-14

## 2022-10-19 ENCOUNTER — NON-APPOINTMENT (OUTPATIENT)
Age: 72
End: 2022-10-19

## 2022-10-19 ENCOUNTER — LABORATORY RESULT (OUTPATIENT)
Age: 72
End: 2022-10-19

## 2022-10-19 ENCOUNTER — APPOINTMENT (OUTPATIENT)
Dept: CARDIOLOGY | Facility: CLINIC | Age: 72
End: 2022-10-19

## 2022-10-19 VITALS
DIASTOLIC BLOOD PRESSURE: 80 MMHG | BODY MASS INDEX: 26.99 KG/M2 | SYSTOLIC BLOOD PRESSURE: 132 MMHG | OXYGEN SATURATION: 99 % | WEIGHT: 162 LBS | HEIGHT: 65 IN | HEART RATE: 59 BPM

## 2022-10-19 PROCEDURE — 99214 OFFICE O/P EST MOD 30 MIN: CPT | Mod: 25

## 2022-10-19 PROCEDURE — 93000 ELECTROCARDIOGRAM COMPLETE: CPT

## 2022-10-19 RX ORDER — HYDRALAZINE HYDROCHLORIDE 25 MG/1
25 TABLET ORAL
Qty: 90 | Refills: 5 | Status: DISCONTINUED | COMMUNITY
Start: 2022-03-07 | End: 2022-10-19

## 2022-10-19 RX ORDER — DAPAGLIFLOZIN 10 MG/1
10 TABLET, FILM COATED ORAL
Qty: 30 | Refills: 3 | Status: DISCONTINUED | COMMUNITY
Start: 2022-06-22 | End: 2022-10-19

## 2022-10-21 LAB
ALBUMIN SERPL ELPH-MCNC: 4.7 G/DL
ALP BLD-CCNC: 80 U/L
ALT SERPL-CCNC: 15 U/L
ANION GAP SERPL CALC-SCNC: 13 MMOL/L
AST SERPL-CCNC: 25 U/L
BASOPHILS # BLD AUTO: 0.05 K/UL
BASOPHILS NFR BLD AUTO: 1.1 %
BILIRUB SERPL-MCNC: 0.6 MG/DL
BUN SERPL-MCNC: 14 MG/DL
CALCIUM SERPL-MCNC: 9.6 MG/DL
CHLORIDE SERPL-SCNC: 102 MMOL/L
CO2 SERPL-SCNC: 24 MMOL/L
CREAT SERPL-MCNC: 1.52 MG/DL
EGFR: 48 ML/MIN/1.73M2
EOSINOPHIL # BLD AUTO: 0.12 K/UL
EOSINOPHIL NFR BLD AUTO: 2.7 %
GLUCOSE SERPL-MCNC: 65 MG/DL
HCT VFR BLD CALC: 47.1 %
HGB BLD-MCNC: 15.2 G/DL
IMM GRANULOCYTES NFR BLD AUTO: 0.4 %
LYMPHOCYTES # BLD AUTO: 1.86 K/UL
LYMPHOCYTES NFR BLD AUTO: 41.8 %
MAN DIFF?: NORMAL
MCHC RBC-ENTMCNC: 30 PG
MCHC RBC-ENTMCNC: 32.3 GM/DL
MCV RBC AUTO: 93.1 FL
MONOCYTES # BLD AUTO: 0.53 K/UL
MONOCYTES NFR BLD AUTO: 11.9 %
NEUTROPHILS # BLD AUTO: 1.87 K/UL
NEUTROPHILS NFR BLD AUTO: 42.1 %
NT-PROBNP SERPL-MCNC: 610 PG/ML
PLATELET # BLD AUTO: 158 K/UL
POTASSIUM SERPL-SCNC: 5.4 MMOL/L
PROT SERPL-MCNC: 7.7 G/DL
RBC # BLD: 5.06 M/UL
RBC # FLD: 13.6 %
SODIUM SERPL-SCNC: 139 MMOL/L
TSH SERPL-ACNC: 0.64 UIU/ML
WBC # FLD AUTO: 4.45 K/UL

## 2022-10-21 RX ORDER — DAPAGLIFLOZIN 10 MG/1
10 TABLET, FILM COATED ORAL
Qty: 30 | Refills: 2 | Status: DISCONTINUED | COMMUNITY
Start: 2022-10-21 | End: 2022-10-21

## 2022-10-21 NOTE — HISTORY OF PRESENT ILLNESS
[FreeTextEntry1] : Mr. Torrez is a 71 y/o M with a history of ACC/AHA Stage C NICM (LV 6.6 cm, LVEF 35-40% with GDMT from 25-30% ) with severe functional MR, history of poorly controlled HTN, and CKD III (baseline Cr 1.6) presenting to HF clinic for further management. Referred by Dr. Riddle. Previously followed by Dr. Sparks. \par \par For full initial details pleas refer to note from 6/10/22. \par \par Patient is here for a follow up today. Last seen in office June at which time Farxiga was to be added but states he did not start it. \reji \reji Since last visit, was well until last Tuesday when had sudden shortness of breath so went to [facility]. Reports he stayed for 3 days and was given diuretics. Per niece, he was hypertensive during that time. Reports was recommended an angiogram which he refused. Possibly had a stress test but unknown results. Since has returned back to his baseline. Able to walk >20 blocks without stopping and has no difficulty with climbing 2 flights of stairs. Home BP generally 116-130/70s and weight is 160 lbs. 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 He denies LH/dizziness, CP, palpitations, orthopnea, PND, ABD discomfort and LE swelling. Pt does not have an ICD. \par \par Adherent to low sodium diet. States he drinks a lot but thinks it is less than 2 L. He lives with his niece Maddy and she is managing his medications. \par \par Hasn't checked BP due to monitor not working. \par \reji Has received Covid vaccines (hasn't received bivalent vaccine). Reports he had Covid at the time of his appendix surgery 1/22. Hasn't received flu vaccine.

## 2022-10-21 NOTE — CARDIOLOGY SUMMARY
[de-identified] : \par 10/19/22 - NSR, LVH, LAE\par \par 6/10/22 NSR 62, LVH with strain pattern\par \par 9/21/20: SR, LVH with strain pattern   [de-identified] : \par Holter 9/2020: 5% PVC burden, HR  bpm, no NSVT\par  [de-identified] : \par TTE 6/29/2021: LV 5.4 cm, LVEF 35-40% with mild global hypokinesis, mild-moderate MR, mild AI, normal RV size/function, IVC small/collapsing, estimated PASP 18 mmHg\par \par TTE 9/3/20: LV 6.6 cm, mild concentric LVH, LVEF 20-25%, LVOT VTI 12 cm, restrictive diastolic filling with E/A 3.3 and e' velocities in 3-5 range, normal RV size/function, severe mitral regurgitation, mild TR, mild-moderate AI\par  [de-identified] : \par Cardiac MRI 11/2020:  ml with elevated LV mas index of 95 g/m2, LVEF 22%, normal RV size/function, linear mesocardial LGE along septum and anterior wall c/w nonischemic cardiomyopathy  [de-identified] : \par [facility] 3/2020: minimal non-obstructive CAD\par RH 3/2020: RA 1, PA 43/14 (27), PCWP 18mmHg, LVEDP 15 mmHg, Lissa CO/CI 3.8/2.2\par

## 2022-10-21 NOTE — PHYSICAL EXAM
[Well Developed] : well developed [Well Nourished] : well nourished [No Acute Distress] : no acute distress [Normal S1, S2] : normal S1, S2 [No Murmur] : no murmur [No Rub] : no rub [No Gallop] : no gallop [Clear Lung Fields] : clear lung fields [No Respiratory Distress] : no respiratory distress  [Soft] : abdomen soft [Non Tender] : non-tender [Normal Gait] : normal gait [No Edema] : no edema [Normal Radial B/L] : normal radial B/L [Moves all extremities] : moves all extremities [No Focal Deficits] : no focal deficits [Normal Speech] : normal speech [Alert and Oriented] : alert and oriented [Normal memory] : normal memory [de-identified] : JVP 6 cm H2O, no HJR [de-identified] : Warm peripherally

## 2022-10-21 NOTE — ASSESSMENT
[FreeTextEntry1] : 73 y/o M with a history of ACC/AHA Stage C NICM/HFimpEF (LV 6.6 cm, LVEF improved to 35-40% from 25-30%) with severe functional MR, history of poorly controlled HTN, and CKD III (baseline Cr 1.6) presenting to HF clinic for further management. Recent hospitalization likely secondary to hypertension which may have exacerbated mitral regurgitation. He is currently euvolemic with improvement of NYHA class back to baseline. He has room for further up titration. \par \par # ACC/AHA Stage C NICM\par - Etiology: suspect hypertensive cardiomyopathy, no signs of infiltrative disease on cMRI\par - continue Entresto  BID\par - continue Coreg 25 mg BID\par - continue spironolactone 12.5 mg QD\par - had been taken off HDZN; resume at 25 mg TID\par - Diuretic: euvolemic; reduce lasix to 40 mg every as needed with addition of Farxiga; goal weight 160-162 pounds \par - Device: repeat TTE on 6/29/21 overall LV dimensions have reduced and LVEF has improved. There is no indication for ICD at this time.\par - labs done in office and reviewed\par - start Farxiga 10 mg daily; repeat labs in 2 weeks\par \par # CKD - improved to 1.3-1.5 \par - renal function stable\par - will start Farxiga\par \par # Severe functional MR\par - repeat TTE 6/2021 improved to mild-mod MR, will optimize GDMT as above\par - repeat TTE with next appt in 6 months\par \par # HTN\par - controlling with GDMT as above \par \par Follow-up with NP in 3 months then me in 6 months \par

## 2022-10-21 NOTE — DISCUSSION/SUMMARY
[Patient] : the patient [___ Month(s)] : in [unfilled] month(s) [EKG obtained to assist in diagnosis and management of assessed problem(s)] : EKG obtained to assist in diagnosis and management of assessed problem(s)

## 2022-11-07 ENCOUNTER — APPOINTMENT (OUTPATIENT)
Dept: CARDIOLOGY | Facility: CLINIC | Age: 72
End: 2022-11-07

## 2022-11-18 ENCOUNTER — APPOINTMENT (OUTPATIENT)
Dept: HEART FAILURE | Facility: CLINIC | Age: 72
End: 2022-11-18

## 2022-11-18 PROCEDURE — 36415 COLL VENOUS BLD VENIPUNCTURE: CPT

## 2022-11-23 LAB
ALBUMIN SERPL ELPH-MCNC: 4.4 G/DL
ALP BLD-CCNC: 75 U/L
ALT SERPL-CCNC: 12 U/L
ANION GAP SERPL CALC-SCNC: 13 MMOL/L
AST SERPL-CCNC: 25 U/L
BILIRUB SERPL-MCNC: 0.8 MG/DL
BUN SERPL-MCNC: 21 MG/DL
CALCIUM SERPL-MCNC: 9.7 MG/DL
CHLORIDE SERPL-SCNC: 103 MMOL/L
CO2 SERPL-SCNC: 21 MMOL/L
CREAT SERPL-MCNC: 1.78 MG/DL
EGFR: 40 ML/MIN/1.73M2
ESTIMATED AVERAGE GLUCOSE: 131 MG/DL
GLUCOSE SERPL-MCNC: 98 MG/DL
HBA1C MFR BLD HPLC: 6.2 %
POTASSIUM SERPL-SCNC: 4.1 MMOL/L
PROT SERPL-MCNC: 8.2 G/DL
SODIUM SERPL-SCNC: 137 MMOL/L

## 2022-12-01 NOTE — PHYSICAL THERAPY INITIAL EVALUATION ADULT - GENERAL OBSERVATIONS, REHAB EVAL
[facility] -   Infectious diseases evaluation  admission date 10/31/2022    reason for consultation:   Antibiotic Management    Impression :   Current:  VRE Faecalis UTI  ESRD on HD M-W-F  DM II    Other:    Discussion / summary of stay / plan of care   Patient has received a 5-day course of antibiotics. Has been adequately treated. Recommendations   Stop Ampicillin. Patient has received a 5-day course. Supportive care. Discussed with patient, RN. 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 Denies any dysuria at this time. Does not remember why a urine sample was obtained. She is in a lot of pain now stating her stomach and bottom hurt because she is so constipated. States her last BM was 3 days ago. Denies any fever, chills, nausea, vomiting, diarrhea. Interval changes  12/1/2022   Awake and alert. LUE AVF. Denies any fever, chills, nausea, vomiting, diarrhea. C/o severe abdominal pain, pain to her bottom from constipation.       Summary of relevant labs:  Labs:    Micro:  11/21/22 Urine CX-VRE Faecalis  Susceptibility    Vancomycin resistant enterococcus faecalis (1)    Antibiotic Interpretation Microscan Method Status    ampicillin Sensitive <=2 BACTERIAL SUSCEPTIBILITY PANEL JEAN CARLOS Final    ciprofloxacin Resistant >=8 BACTERIAL SUSCEPTIBILITY PANEL JEAN CARLOS Final    levofloxacin Resistant >=8 BACTERIAL SUSCEPTIBILITY PANEL JEAN CARLOS Final    linezolid Sensitive 2 BACTERIAL SUSCEPTIBILITY PANEL JEAN CARLOS Final    nitrofurantoin Sensitive <=16 BACTERIAL SUSCEPTIBILITY PANEL JEAN CARLOS Final    tetracycline Resistant >=16 BACTERIAL SUSCEPTIBILITY PANEL JEAN CARLOS Final    vancomycin Resistant >=32 BACTERIAL SUSCEPTIBILITY PANEL JEAN CARLOS Final          Specimen Collected: 11/21/22 18:00 EST        Imaging:      I have personally reviewed the past medical history, past surgical history, medications, social history, and family history, and I haveupdated the database accordingly. Allergies:   Morphine and Januvia [sitagliptin]     Review of Systems:     Review of Systems   Gastrointestinal:  Positive for abdominal pain and constipation. Genitourinary:  Negative for dysuria and flank pain. All other systems reviewed and are negative. Physical Examination :       Physical Exam  Vitals and nursing note reviewed. Constitutional:       Appearance: She is not ill-appearing. Comments: Tearful. C/o constipation with associated abdominal pain. HENT:      Head: Normocephalic and atraumatic. Right Ear: External ear normal.      Left Ear: External ear normal.      Nose: Nose normal. No congestion. Mouth/Throat:      Mouth: Mucous membranes are moist.      Pharynx: Oropharynx is clear. Eyes:      Extraocular Movements: Extraocular movements intact. Conjunctiva/sclera: Conjunctivae normal.      Pupils: Pupils are equal, round, and reactive to light. Cardiovascular:      Rate and Rhythm: Normal rate and regular rhythm. Heart sounds: Normal heart sounds. No murmur heard. Pulmonary:      Effort: Pulmonary effort is normal.      Breath sounds: Normal breath sounds. No wheezing or rhonchi. Comments: RA  Abdominal:      General: Bowel sounds are normal. There is no distension. Palpations: Abdomen is soft. Tenderness: There is no abdominal tenderness. There is no right CVA tenderness or left CVA tenderness. Genitourinary:     Comments: No barros. Musculoskeletal:         General: Normal range of motion. Cervical back: Normal range of motion. No rigidity. Right lower leg: No edema.       Left Pt. received seated at edge of bed lower leg: No edema. Skin:     General: Skin is warm and dry. Capillary Refill: Capillary refill takes less than 2 seconds. Findings: No erythema. Neurological:      Mental Status: She is alert and oriented to person, place, and time. Psychiatric:      Comments: Tearful from pain.        Past Medical History:     Past Medical History:   Diagnosis Date    Asthma     Atherosclerosis of native arteries of extremities with rest pain, unspecified extremity ([address]     Depression 10/25/2022    Diabetes mellitus ([address]     since 11 y/o, unsure if type 1 or 2    Headache, migraine     for 2 years    Hypertension     Lichen simplex chronicus     Noninflammatory disorder of vagina, unspecified     Patient's noncompliance with other medical treatment and regimen for other reason     Type 2 diabetes mellitus without complication ([facility] 45/91/1684       Past Surgical  History:     Past Surgical History:   Procedure Laterality Date    OVARY REMOVAL      July 2010    OVARY REMOVAL      UPPER GASTROINTESTINAL ENDOSCOPY N/A 10/28/2022    EGD BIOPSY performed by Candido Valentin MD at [facility]       Medications:       Social History:     Social History     Socioeconomic History    Marital status: Single     Spouse name: Not on file    Number of children: Not on file    Years of education: Not on file    Highest education level: Not on file   Occupational History    Not on file   Tobacco Use    Smoking status: Never    Smokeless tobacco: Never   Vaping Use    Vaping Use: Never used   Substance and Sexual Activity    Alcohol use: No    Drug use: No    Sexual activity: Not on file   Other Topics Concern    Not on file   Social History Narrative    Not on file     Social Determinants of Health     Financial Resource Strain: Not on file   Food Insecurity: Not on file   Transportation Needs: Not on file   Physical Activity: Not on file   Stress: Not on file   Social Connections: Not on file   Intimate Partner Violence: Not on file Housing Stability: Not on file       Family History:     Family History   Problem Relation Age of Onset    High Blood Pressure Mother     Diabetes Mother     Other Mother 27        Lung cancer    High Blood Pressure Father     Diabetes Father     Other Father 27        Prostate cancer      Medical Decision Making:   I have independently reviewed/ordered the following labs:    CBC with Differential: No results for input(s): WBC, HGB, HCT, PLT, SEGSPCT, BANDSPCT, LYMPHOPCT, MONOPCT, EOSPCT in the last 72 hours. BMP:No results for input(s): NA, K, CL, CO2, BUN, CREATININE, CA, MG in the last 72 hours. Hepatic Function Panel: No results for input(s): PROT, LABALBU, BILIDIR, IBILI, BILITOT, ALKPHOS, ALT, AST in the last 72 hours. No results for input(s): RPR in the last 72 hours. No results for input(s): HIV in the last 72 hours. No results for input(s): BC in the last 72 hours. Lab Results   Component Value Date/Time    CREATININE 3.57 11/11/2022 05:10 PM    GLUCOSE 103 11/11/2022 05:10 PM    GLUCOSE 492 05/07/2019 11:18 AM       Detailed results: Thank you for allowing us to participate in the care of this patient. Please call with questions. This note is created with the assistance of a speech recognition program.  While intending to generate adocument that actually reflects the content of the visit, the document can still have some errors including those of syntax and sound a like substitutions which may escape proof reading. It such instances, actual meaningcan be extrapolated by contextual diversion.     CUAUHTEMOC Leblanc - CNP  Office: [phone]  Perfect serve / office [phone]

## 2022-12-14 ENCOUNTER — APPOINTMENT (OUTPATIENT)
Dept: CARDIOLOGY | Facility: CLINIC | Age: 72
End: 2022-12-14

## 2023-01-18 ENCOUNTER — APPOINTMENT (OUTPATIENT)
Dept: HEART FAILURE | Facility: CLINIC | Age: 73
End: 2023-01-18

## 2023-02-22 ENCOUNTER — APPOINTMENT (OUTPATIENT)
Dept: HEART FAILURE | Facility: CLINIC | Age: 73
End: 2023-02-22
Payer: MEDICAID

## 2023-02-22 ENCOUNTER — NON-APPOINTMENT (OUTPATIENT)
Age: 73
End: 2023-02-22

## 2023-02-22 ENCOUNTER — INPATIENT (INPATIENT)
Facility: HOSPITAL | Age: 73
LOS: 6 days | Discharge: HOME HEALTH | End: 2023-03-01
Attending: STUDENT IN AN ORGANIZED HEALTH CARE EDUCATION/TRAINING PROGRAM | Admitting: STUDENT IN AN ORGANIZED HEALTH CARE EDUCATION/TRAINING PROGRAM
Payer: MEDICAID

## 2023-02-22 VITALS
SYSTOLIC BLOOD PRESSURE: 139 MMHG | HEART RATE: 85 BPM | RESPIRATION RATE: 18 BRPM | DIASTOLIC BLOOD PRESSURE: 99 MMHG | TEMPERATURE: 98 F | OXYGEN SATURATION: 98 %

## 2023-02-22 VITALS
HEART RATE: 76 BPM | HEIGHT: 65 IN | WEIGHT: 165 LBS | SYSTOLIC BLOOD PRESSURE: 151 MMHG | DIASTOLIC BLOOD PRESSURE: 78 MMHG | OXYGEN SATURATION: 98 % | BODY MASS INDEX: 27.49 KG/M2

## 2023-02-22 DIAGNOSIS — R06.02 SHORTNESS OF BREATH: ICD-10-CM

## 2023-02-22 LAB
ALBUMIN SERPL ELPH-MCNC: 4.4 G/DL — SIGNIFICANT CHANGE UP (ref 3.3–5)
ALP SERPL-CCNC: 98 U/L — SIGNIFICANT CHANGE UP (ref 40–120)
ALT FLD-CCNC: 17 U/L — SIGNIFICANT CHANGE UP (ref 4–41)
ANION GAP SERPL CALC-SCNC: 10 MMOL/L — SIGNIFICANT CHANGE UP (ref 7–14)
APTT BLD: 29.5 SEC — SIGNIFICANT CHANGE UP (ref 27–36.3)
AST SERPL-CCNC: 25 U/L — SIGNIFICANT CHANGE UP (ref 4–40)
BASE EXCESS BLDV CALC-SCNC: 1.8 MMOL/L — SIGNIFICANT CHANGE UP (ref -2–3)
BASOPHILS # BLD AUTO: 0.04 K/UL — SIGNIFICANT CHANGE UP (ref 0–0.2)
BASOPHILS NFR BLD AUTO: 0.7 % — SIGNIFICANT CHANGE UP (ref 0–2)
BILIRUB SERPL-MCNC: 0.7 MG/DL — SIGNIFICANT CHANGE UP (ref 0.2–1.2)
BUN SERPL-MCNC: 17 MG/DL — SIGNIFICANT CHANGE UP (ref 7–23)
CA-I SERPL-SCNC: 1.23 MMOL/L — SIGNIFICANT CHANGE UP (ref 1.15–1.33)
CALCIUM SERPL-MCNC: 9.8 MG/DL — SIGNIFICANT CHANGE UP (ref 8.4–10.5)
CHLORIDE BLDV-SCNC: 103 MMOL/L — SIGNIFICANT CHANGE UP (ref 96–108)
CHLORIDE SERPL-SCNC: 105 MMOL/L — SIGNIFICANT CHANGE UP (ref 98–107)
CO2 BLDV-SCNC: 31.1 MMOL/L — HIGH (ref 22–26)
CO2 SERPL-SCNC: 26 MMOL/L — SIGNIFICANT CHANGE UP (ref 22–31)
CREAT SERPL-MCNC: 1.53 MG/DL — HIGH (ref 0.5–1.3)
EGFR: 48 ML/MIN/1.73M2 — LOW
EOSINOPHIL # BLD AUTO: 0.15 K/UL — SIGNIFICANT CHANGE UP (ref 0–0.5)
EOSINOPHIL NFR BLD AUTO: 2.6 % — SIGNIFICANT CHANGE UP (ref 0–6)
FLUAV AG NPH QL: SIGNIFICANT CHANGE UP
FLUBV AG NPH QL: SIGNIFICANT CHANGE UP
GAS PNL BLDV: 136 MMOL/L — SIGNIFICANT CHANGE UP (ref 136–145)
GAS PNL BLDV: SIGNIFICANT CHANGE UP
GAS PNL BLDV: SIGNIFICANT CHANGE UP
GLUCOSE BLDV-MCNC: 99 MG/DL — SIGNIFICANT CHANGE UP (ref 70–99)
GLUCOSE SERPL-MCNC: 94 MG/DL — SIGNIFICANT CHANGE UP (ref 70–99)
HCO3 BLDV-SCNC: 29 MMOL/L — SIGNIFICANT CHANGE UP (ref 22–29)
HCT VFR BLD CALC: 47.3 % — SIGNIFICANT CHANGE UP (ref 39–50)
HCT VFR BLDA CALC: 46 % — SIGNIFICANT CHANGE UP (ref 39–51)
HGB BLD CALC-MCNC: 15.3 G/DL — SIGNIFICANT CHANGE UP (ref 12.6–17.4)
HGB BLD-MCNC: 14.7 G/DL — SIGNIFICANT CHANGE UP (ref 13–17)
IANC: 2.2 K/UL — SIGNIFICANT CHANGE UP (ref 1.8–7.4)
IMM GRANULOCYTES NFR BLD AUTO: 0.2 % — SIGNIFICANT CHANGE UP (ref 0–0.9)
INR BLD: 1.14 RATIO — SIGNIFICANT CHANGE UP (ref 0.88–1.16)
LACTATE BLDV-MCNC: 2.1 MMOL/L — HIGH (ref 0.5–2)
LYMPHOCYTES # BLD AUTO: 2.79 K/UL — SIGNIFICANT CHANGE UP (ref 1–3.3)
LYMPHOCYTES # BLD AUTO: 47.8 % — HIGH (ref 13–44)
MAGNESIUM SERPL-MCNC: 2.4 MG/DL — SIGNIFICANT CHANGE UP (ref 1.6–2.6)
MCHC RBC-ENTMCNC: 28.3 PG — SIGNIFICANT CHANGE UP (ref 27–34)
MCHC RBC-ENTMCNC: 31.1 GM/DL — LOW (ref 32–36)
MCV RBC AUTO: 91.1 FL — SIGNIFICANT CHANGE UP (ref 80–100)
MONOCYTES # BLD AUTO: 0.65 K/UL — SIGNIFICANT CHANGE UP (ref 0–0.9)
MONOCYTES NFR BLD AUTO: 11.1 % — SIGNIFICANT CHANGE UP (ref 2–14)
NEUTROPHILS # BLD AUTO: 2.2 K/UL — SIGNIFICANT CHANGE UP (ref 1.8–7.4)
NEUTROPHILS NFR BLD AUTO: 37.6 % — LOW (ref 43–77)
NRBC # BLD: 0 /100 WBCS — SIGNIFICANT CHANGE UP (ref 0–0)
NRBC # FLD: 0 K/UL — SIGNIFICANT CHANGE UP (ref 0–0)
NT-PROBNP SERPL-SCNC: 229 PG/ML — SIGNIFICANT CHANGE UP
PCO2 BLDV: 57 MMHG — HIGH (ref 42–55)
PH BLDV: 7.32 — SIGNIFICANT CHANGE UP (ref 7.32–7.43)
PLATELET # BLD AUTO: 238 K/UL — SIGNIFICANT CHANGE UP (ref 150–400)
PO2 BLDV: 29 MMHG — SIGNIFICANT CHANGE UP (ref 25–45)
POTASSIUM BLDV-SCNC: 4.2 MMOL/L — SIGNIFICANT CHANGE UP (ref 3.5–5.1)
POTASSIUM SERPL-MCNC: 4.5 MMOL/L — SIGNIFICANT CHANGE UP (ref 3.5–5.3)
POTASSIUM SERPL-SCNC: 4.5 MMOL/L — SIGNIFICANT CHANGE UP (ref 3.5–5.3)
PROT SERPL-MCNC: 8.3 G/DL — SIGNIFICANT CHANGE UP (ref 6–8.3)
PROTHROM AB SERPL-ACNC: 13.2 SEC — SIGNIFICANT CHANGE UP (ref 10.5–13.4)
RBC # BLD: 5.19 M/UL — SIGNIFICANT CHANGE UP (ref 4.2–5.8)
RBC # FLD: 13.8 % — SIGNIFICANT CHANGE UP (ref 10.3–14.5)
RSV RNA NPH QL NAA+NON-PROBE: SIGNIFICANT CHANGE UP
SAO2 % BLDV: 46 % — LOW (ref 67–88)
SARS-COV-2 RNA SPEC QL NAA+PROBE: SIGNIFICANT CHANGE UP
SODIUM SERPL-SCNC: 141 MMOL/L — SIGNIFICANT CHANGE UP (ref 135–145)
TROPONIN T, HIGH SENSITIVITY RESULT: 16 NG/L — SIGNIFICANT CHANGE UP
TROPONIN T, HIGH SENSITIVITY RESULT: 18 NG/L — SIGNIFICANT CHANGE UP
WBC # BLD: 5.84 K/UL — SIGNIFICANT CHANGE UP (ref 3.8–10.5)
WBC # FLD AUTO: 5.84 K/UL — SIGNIFICANT CHANGE UP (ref 3.8–10.5)

## 2023-02-22 PROCEDURE — 99215 OFFICE O/P EST HI 40 MIN: CPT | Mod: 25

## 2023-02-22 PROCEDURE — 99285 EMERGENCY DEPT VISIT HI MDM: CPT

## 2023-02-22 PROCEDURE — 71045 X-RAY EXAM CHEST 1 VIEW: CPT | Mod: 26

## 2023-02-22 PROCEDURE — 99232 SBSQ HOSP IP/OBS MODERATE 35: CPT

## 2023-02-22 PROCEDURE — 93000 ELECTROCARDIOGRAM COMPLETE: CPT

## 2023-02-22 PROCEDURE — 99223 1ST HOSP IP/OBS HIGH 75: CPT

## 2023-02-22 RX ORDER — SPIRONOLACTONE 25 MG/1
25 TABLET ORAL
Qty: 45 | Refills: 1 | Status: DISCONTINUED | COMMUNITY
Start: 2022-03-07 | End: 2023-02-22

## 2023-02-22 RX ORDER — SACUBITRIL AND VALSARTAN 97; 103 MG/1; MG/1
97-103 TABLET, FILM COATED ORAL TWICE DAILY
Qty: 180 | Refills: 1 | Status: DISCONTINUED | COMMUNITY
Start: 2020-09-17 | End: 2023-02-22

## 2023-02-22 RX ORDER — ASPIRIN/CALCIUM CARB/MAGNESIUM 324 MG
324 TABLET ORAL ONCE
Refills: 0 | Status: DISCONTINUED | OUTPATIENT
Start: 2023-02-22 | End: 2023-02-23

## 2023-02-22 NOTE — ED PROVIDER NOTE - ATTENDING CONTRIBUTION TO CARE
I have personally seen and examined this patient.  I have fully participated in the care of this patient. I performed a substantive portion of the visit including all aspects of the medical decision making. I have reviewed all pertinent clinical information, including history, physical exam, plan and the Resident’s note and agree except as noted. - MD Mio.    see dispo charts I have personally seen and examined this patient.  I have fully participated in the care of this patient. I have reviewed all pertinent clinical information, including history, physical exam, plan and the Fellow’s note and agree except as noted. - MD Mio.     see dispo charts

## 2023-02-22 NOTE — H&P ADULT - NSHPPHYSICALEXAM_GEN_ALL_CORE
Vital Signs Last 24 Hrs  T(C): 36.7 (22 Feb 2023 18:09), Max: 36.7 (22 Feb 2023 14:39)  T(F): 98.1 (22 Feb 2023 18:09), Max: 98.1 (22 Feb 2023 18:09)  HR: 74 (22 Feb 2023 18:54) (70 - 85)  BP: 136/92 (22 Feb 2023 18:54) (136/92 - 139/99)  BP(mean): --  RR: 16 (22 Feb 2023 18:54) (16 - 18)  SpO2: 99% (22 Feb 2023 18:54) (98% - 100%)    Parameters below as of 22 Feb 2023 18:54  Patient On (Oxygen Delivery Method): room air

## 2023-02-22 NOTE — H&P ADULT - TIME BILLING
Reviewed admission imaging reports, and admission labs prior to the encounter with  the patient   Reviewed Triage and ED course/ documentation   Reviewed consultants notes, including::: EP  Performed full physical exam and ROS  Reviewed outpatient records on Alls[employer]ripts - progress note dated 2/22/2023 by Dr. Rajesh Charles, Cardiology: CHF, recent hospitalization likely secondary to hypertension which may have exacerbated mitral regurgitation. S/P hospitalization 12/19/22at [facility] secondary to seizure/cardiac arrest at home and then again when he arrived at hospital.. He is currently euvolemic and hypertensive and does not have an ICD;  Obtained list of home medications via Allscripts:  off [place] and Entresto and was put on ASA and Plavix per Dr. Rajesh Charles  Performed home medications reconciliation on EMR  Discussed plan with with the patient  Ordered or reviewed new admission medications and placed or reviewed all hospitalization admission orders  Managed (continued, held or adjusted doses) of home medications   Ordered  or reviewed orders of  new imaging and new lab w/up Reviewed admission imaging reports, and admission labs prior to the encounter with  the patient   Reviewed Triage and ED course/ documentation   Reviewed consultants notes, including::: EP  Performed full physical exam and ROS  Reviewed outpatient records on Alls[employer]ripts - progress note dated 2/22/2023 by Dr. Rajesh Charles, Cardiology: CHF, recent hospitalization likely secondary to hypertension which may have exacerbated mitral regurgitation. S/P hospitalization 12/19/22at [facility] secondary to seizure/cardiac arrest at home and then again when he arrived at hospital.. He is currently euvolemic and hypertensive and does not have an ICD;  Obtained list of home medications via Allscripts:  off [place] and Entresto and was put on ASA and Plavix per Dr. Rajesh Charles  Performed home medications reconciliation on EMR  Discussed plan with with the patient  Ordered or reviewed new admission medications and placed or reviewed all hospitalization admission orders  Managed (continued, held or adjusted doses) of home medications   Ordered  or reviewed orders of  new imaging and new lab w/up  Requested new consultations including::: CHF Team

## 2023-02-22 NOTE — HISTORY OF PRESENT ILLNESS
[FreeTextEntry1] : Mr. Torrez is a 71 y/o M with a history of ACC/AHA Stage C NICM (LV 6.6 cm, LVEF 35-40% with GDMT from 25-30% ) with severe functional MR, history of poorly controlled HTN, and CKD III (baseline Cr 1.6) presenting to HF clinic for further management. Referred by Dr. Riddle. Previously followed by Dr. Sparks. S/P hospitalization 12/19/22at [facility] secondary to seizure/cardiac arrest. \par \par For full initial details pleas refer to note from 6/10/22. \par \par Patient is here for a follow up today with his niece Maddy. Since last visit, S/P hospitalization 12/19/22 at [facility] secondary to seizure/cardiac arrest.at home and then another seizure with cardiac arrest when he got to [facility].  States he was in a coma for 3 days. Told he was too weak to have a PPM/defib. He went to rehab in January and was d/c 2/9/23.\par \par He is off marco and Entresto and was put on ASA and plavix and told he should follow with neuro and GI. States he did not have an angiogram or other heart tests. \par \par Currently, states he can walk approx 1 block with a cane since getting out of rehab earlier this week. He walked a flight of stairs without dyspnea. He sleeps with 2 pillows with no orthopnea/\par \par  BP today 151/78 and weight is 165 lbs. today Reports his appetite is good. \par \par  He denies LH/dizziness, CP, palpitations, orthopnea, PND, ABD discomfort and LE swelling. Pt does not have an ICD. \par \par Adherent to low sodium diet. States he drinks a lot but thinks it is less than 2 L. He lives with his niece Maddy and she is managing his medications. \par \reji Has received Covid vaccines (hasn't received bivalent vaccine). Reports he had Covid at the time of his appendix surgery 1/22. Hasn't received flu vaccine.

## 2023-02-22 NOTE — ED PROVIDER NOTE - OBJECTIVE STATEMENT
72-year-old man with history of hypertension, CKD, recent long-term hospitalization status post CVA, cardiac arrest, with noted diminishing ejection fraction presents referred from cardiology for shortness of breath and ICD placement.  He reports after long ICU stay status post cardiac arrest, his cardiologist had referred him for ICD, which she declined at the time.  Today during a follow-up evaluation with cardiology, the importance of an ICD placement was again stressed, at which point he agreed to the procedure.  His cardiologist (Dr. Charles) referred him to the ED for further evaluation, as well as admission for ICD placement.  He denies any chest pain, fevers, chills, worsening lower extremity edema, shortness of breath at this time.

## 2023-02-22 NOTE — H&P ADULT - NSHPLABSRESULTS_GEN_ALL_CORE
.    -Personally interpreted EKG: pending    -Personally interpreted CXR:    -Personally reviewed labs below:                        14.7   5.84  )-----------( 238      ( 22 Feb 2023 18:57 )             47.3   02-22    141  |  105  |  17  ----------------------------<  94  4.5   |  26  |  1.53<H>    Ca    9.8      22 Feb 2023 18:57  Mg     2.40     02-22    TPro  8.3  /  Alb  4.4  /  TBili  0.7  /  DBili  x   /  AST  25  /  ALT  17  /  AlkPhos  98  02-22    18:57 - VBG - pH: 7.32  | pCO2: 57    | pO2: 29    | Lactate: 2.1      SARS-CoV-2 Result: NotDetec (02-22-23 @ 18:57)  Influenza A Result: NotDetec (02-22-23 @ 18:57)  Influenza B Result: NotDetec (02-22-23 @ 18:57)  Resp Syn Virus Result: NotDetec (02-22-23 @ 18:57) .    -Personally interpreted EKG: pending    -Personally interpreted CXR:    -Personally reviewed labs below:                        14.7   5.84  )-----------( 238      ( 22 Feb 2023 18:57 )             47.3   02-22    141  |  105  |  17  ----------------------------<  94  4.5   |  26  |  1.53<H>    Ca    9.8      22 Feb 2023 18:57  Mg     2.40     02-22    TPro  8.3  /  Alb  4.4  /  TBili  0.7  /  DBili  x   /  AST  25  /  ALT  17  /  AlkPhos  98  02-22    18:57 - VBG - pH: 7.32  | pCO2: 57    | pO2: 29    | Lactate: 2.1      SARS-CoV-2 Result: NotDetec (02-22-23 @ 18:57)  Influenza A Result: NotDetec (02-22-23 @ 18:57)  Influenza B Result: NotDetec (02-22-23 @ 18:57)  Resp Syn Virus Result: NotDetec (02-22-23 @ 18:57)    -Personally reviewed: MR CARD MORPH FUNCTION WAW IC    PROCEDURE DATE: 11/12/2020  INTERPRETATION: CARDIAC MRI WITH AND WITHOUT CONTRAST  CLINICAL HISTORY: CHF.  IMPRESSION:  Dilated LV with diffuse hypokinesis. Left ventricular ejection fraction measures 22%. Linear, mid-myocardial enhancement noted along the septum and anterior wall. Findings are consistent with dilated cardiomyopathy. .    -Personally interpreted EKG: NSR, 77bpm, qtc 457, Tw inv I, biphasic aVL, V5-V6, II, aVF, flattening III, no acute ST changes, no PACs or PVCs    -Personally interpreted CXR: clear lungs, no pleural effusions, no PTX     -Personally reviewed labs below:                        14.7   5.84  )-----------( 238      ( 22 Feb 2023 18:57 )             47.3   02-22    141  |  105  |  17  ----------------------------<  94  4.5   |  26  |  1.53<H>    Ca    9.8      22 Feb 2023 18:57  Mg     2.40     02-22    TPro  8.3  /  Alb  4.4  /  TBili  0.7  /  DBili  x   /  AST  25  /  ALT  17  /  AlkPhos  98  02-22    18:57 - VBG - pH: 7.32  | pCO2: 57    | pO2: 29    | Lactate: 2.1      SARS-CoV-2 Result: NotDetec (02-22-23 @ 18:57)  Influenza A Result: NotDetec (02-22-23 @ 18:57)  Influenza B Result: NotDetec (02-22-23 @ 18:57)  Resp Syn Virus Result: NotDetec (02-22-23 @ 18:57)    -Personally reviewed: MR CARD MORPH FUNCTION WAW IC    PROCEDURE DATE: 11/12/2020  INTERPRETATION: CARDIAC MRI WITH AND WITHOUT CONTRAST  CLINICAL HISTORY: CHF.  IMPRESSION:  Dilated LV with diffuse hypokinesis. Left ventricular ejection fraction measures 22%. Linear, mid-myocardial enhancement noted along the septum and anterior wall. Findings are consistent with dilated cardiomyopathy. .    -Personally interpreted EKG: NSR, 77bpm, qtc 457, Tw inv I, biphasic aVL, V5-V6, II, aVF, flattening III, no acute ST changes, no PACs or PVCs    -Personally interpreted CXR: clear lungs, no pleural effusions, no PTX     -Personally reviewed labs below:                        14.7   5.84  )-----------( 238      ( 22 Feb 2023 18:57 )             47.3   02-22    141  |  105  |  17  ----------------------------<  94  4.5   |  26  |  1.53<H>    Ca    9.8      22 Feb 2023 18:57  Mg     2.40     02-22    TPro  8.3  /  Alb  4.4  /  TBili  0.7  /  DBili  x   /  AST  25  /  ALT  17  /  AlkPhos  98  02-22    18:57 - VBG - pH: 7.32  | pCO2: 57    | pO2: 29    | Lactate: 2.1      SARS-CoV-2 Result: NotDetec (02-22-23 @ 18:57)  Influenza A Result: NotDetec (02-22-23 @ 18:57)  Influenza B Result: NotDetec (02-22-23 @ 18:57)  Resp Syn Virus Result: NotDetec (02-22-23 @ 18:57)    -Personally reviewed: MR CARD MORPH FUNCTION WAW IC    PROCEDURE DATE: 11/12/2020  INTERPRETATION: CARDIAC MRI WITH AND WITHOUT CONTRAST  CLINICAL HISTORY: CHF.  IMPRESSION:  Dilated LV with diffuse hypokinesis. Left ventricular ejection fraction measures 22%. Linear, mid-myocardial enhancement noted along the septum and anterior wall. Findings are consistent with dilated cardiomyopathy.    -Personally reviewed: TTE, 6/29/2021    Conclusions:  1. Tethered mitral valve leaflets with normal opening.  Mild-moderate mitral regurgitation.  2. Calcified trileaflet aortic valve with normal opening.  Mild aortic regurgitation.  3. Mildly dilated left atrium.  LA volume index = 36 cc/m2.  4. Moderate global  left ventricular systolic dysfunction.  5. Normal right ventricular size and systolic function.

## 2023-02-22 NOTE — ASSESSMENT
[FreeTextEntry1] : 73 y/o M with a history of ACC/AHA Stage C NICM/HFimpEF (LV 6.6 cm, LVEF improved to 35-40% from 25-30%) with severe functional MR, history of poorly controlled HTN, and CKD III (baseline Cr 1.6) presenting to HF clinic for further management. Recent hospitalization likely secondary to hypertension which may have exacerbated mitral regurgitation. S/P hospitalization 12/19/22at [facility] secondary to seizure/cardiac arrest at home and then again when he arrived at hospital.. He is currently euvolemic and hypertensive and does not have an ICD. \par \par # ACC/AHA Stage C NICM\par - Etiology: suspect hypertensive cardiomyopathy, no signs of infiltrative disease on cMRI\par - continue metoprolol 25 mg daily ( previously on)  Coreg 25 mg BID\par = Pt is off marco and Entresto \par - pt is on HDZN  25 mg TID\par - Diuretic: euvolemic; pt is taking lasix to 40 mg daily every as needed with addition of Farxiga; goal weight 160-162 pounds \par - Prior TTE on 6/29/21 overall LV dimensions have reduced and LVEF has improved. There is no indication for ICD at that time. Pt has had caridac arrest x 2, will repeat TTE and will call EP for consult for ICD. \par - labs to be done in ED CMP, CBC, serum pro BNP, trop \par \par # CKD - \par -recheck  renal function \par \par # Severe functional MR\par - TTE 6/2021 improved to mild-mod MR, will optimize GDMT as above\par - repeat TTE during admissison\par \par # HTN\par - controlling with GDMT as above \par \par Pt seen by Dr. Charles and inpatient HF team and will admit for evaluation and for ICD after cardiac arrest x 2\par

## 2023-02-22 NOTE — H&P ADULT - NSHPSOCIALHISTORY_GEN_ALL_CORE
works at a [employer]  lives with his niece  former smoker, 1/2PPD  x 20 years  denies any alcohol or illicit drug use worked at a [employer]  lives with his niece  former smoker, 1/2PPD  x 20 years  denies any alcohol or illicit drug use  ambulatory with a cane

## 2023-02-22 NOTE — H&P ADULT - ASSESSMENT
71yo man with MHx of NICM, severe sys HF (LVEF 35-40% with GDMT from 25-30% ), severe functional MR, HTN, and CKD III (baseline Cr 1.6),  seizure/cardiac arrest, recent prolonged hospitalization at [facility] c/b a coma a/w BARROW, being evaluated for ICD by EP;       Conclusions:  1. Tethered mitral valve leaflets with normal opening.  Mild-moderate mitral regurgitation.  2. Calcified trileaflet aortic valve with normal opening.  Mild aortic regurgitation.  3. Mildly dilated left atrium.  LA volume index = 36 cc/m2.  4. Moderate global  left ventricular systolic dysfunction.  5. Normal right ventricular size and systolic function.

## 2023-02-22 NOTE — CONSULT NOTE ADULT - ASSESSMENT
This is 71 year old man with pmhx of NICM, HF (LVEF 35-40% with GDMT from 25-30% ) with severe functional MR, uncontrolled HTN, and CKD III (baseline Cr 1.6), and seizure/cardiac arrest and was hospitalized at [facility] 2/19/2022 to 1/6/2023 and course c/b a coma for 3 days per the nieceMaddy who was sent to [facility] for ICD evaluation. Ep was consulted for ICD evaluation. ICD therapy is recommended for secondary prevention. The alterative risks and benefits was explained in detail which included but not limited to bleeding requiring transfusion, infection, stroke, plural effusion, esophageal injury, pericardial effusion, cardiac tamponade requiring chest tube, intubation, and death. I also explained what it means to live with an ICD. Patient and his niece expressed understanding and all questions were answered. Patient is consented for ICD.     Plan:  - Continuous telemetric monitoring  - Monitor electrolytes and replete K to 4 and Mg to 2  - F/u Labs  -TTE to assess for structural heart disease  - Consult HF for HF management  - Continue care per primary team    Celia Boogie PA-C  Patient to be staffed with attending. Please await attending addendum This is 71 year old man with pmhx of NICM, HF (LVEF 35-40% with GDMT from 25-30% ) with severe functional MR, uncontrolled HTN, and CKD III (baseline Cr 1.6), and seizure/cardiac arrest and was hospitalized at [facility] 2/19/2022 to 1/6/2023 and course c/b a coma for 3 days per the nieceMaddy who was sent to [facility] for ICD evaluation. Of note, patient was seen by Dr. Duncan in 9/21/2020 and was recommended ICD for primary prevention of sudden cardiac death given his severe LV dysfunction despite being on GDMT for more than 3 months. Ep was consulted for ICD evaluation. ICD therapy is recommended for secondary prevention. The alterative, risks, and benefits was explained in detail which included but not limited to bleeding requiring transfusion, infection, stroke, plural effusion, esophageal injury, pericardial effusion, cardiac tamponade requiring chest tube, intubation, and death. I also explained what it means to live with an ICD. Patient and his niece expressed understanding and all questions were answered. Patient is consented for ICD.     Plan:  - Continuous telemetric monitoring  - Monitor electrolytes and replete K to 4 and Mg to 2  - F/u Labs  -TTE to assess for structural heart disease  - Consult HF for HF management  - Continue care per primary team    Celia Boogie PA-C  Patient to be staffed with attending. Please await attending addendum

## 2023-02-22 NOTE — ED ADULT NURSE NOTE - OBJECTIVE STATEMENT
Pt received rm 5, a&ox4 brought to ED by daughter who said pts cardiologist recommend he been seen in ED for worsening CHF symptoms including SOB, and BLE swelling. Pmhx HTN, CKD, cardiac arrest (December, 2022). Pt denies cp, sob, n/v, headache, fever/chills at this time. Breathing even, unlabored. No pitting edema present to bilateral lower extremities on assessment. Pending MD order.

## 2023-02-22 NOTE — CONSULT NOTE ADULT - SUBJECTIVE AND OBJECTIVE BOX
Source: patient and Chart    HPI:  This is 71 year old man with pmhx of NICM, HF (LVEF 35-40% with GDMT from 25-30% ) with severe functional MR, uncontrolled HTN, and CKD III (baseline Cr 1.6), and seizure/cardiac arrest and was hospitalized at [facility] 2/19/2022 to 1/6/2023 and course c/b a coma for 3 days per the niece, Maddy who was sent to [facility] for ICD evaluation.     According the patient, he was at [facility] since 1/6/2023 and was discharge on 2/20/2023. He was told by the rehab to make an appointment with his HF doctor. Dr. Charles. Patient had an appointment with him today and was recommended to come to [facility] for an ICD evaluation. He is able to walk 1-2 block and can walk up 2-3 flights of stairs with his cane with SOB. Patient admitted to SOB since 2/2020 but stated that it had improved. His Holter monitor in 9/2020 showed a 5% PVC burden, HR  bpm, no NSVT. TTE from 6/29/2021 showed an EF 35-40% with mild global hypokinesis, mild-moderate MR, mild AI, normal RV size/function, IVC small/collapsing, estimated PASP 18 mmHg. LHC form 3/2020 was minimal non-obstructive CAD. Patient stated that he is complaint with his medication and that he is on Coreg 25 mg po qd, Jardiance 10mg, hydralazine 25mg po qd, and Lasix 40mg qd and stated that he is no longer taking Entresto which was stopped at [facility]. Patient denied fever, chills, diaphoresis, HA, lightheadedness, dizziness, changes in visions, cold like symptoms  (sore throat, cough, conjunctivitis runny nose), CP, palpitation, abdominal pain, n/v/d, hematuria, melena, hematochezia, LE edema, numbness and tingling.     PAST MEDICAL & SURGICAL HISTORY:  HTN (hypertension)  Chronic kidney disease (CKD)    No significant past surgical history    MEDICATIONS  (STANDING):    MEDICATIONS  (PRN):      FAMILY HISTORY:  No pertinent family history in first degree relatives    SOCIAL HISTORY:  LIVING SITUATION: Lives with his niece  CIGARETTES: Former smoker, smoked 20 year 1/2 pack per day and quit 20 year ago  ALCOHOL: denied   ILLICIT DRUG USES: denied    REVIEW OF SYSTEMS:  CONSTITUTIONAL: No fever, weight loss, chills, shakes, or fatigue  RESPIRATORY: per HPI  CARDIOVASCULAR: per HPI  GASTROINTESTINAL: No abdominal  or epigastric pain, nausea, vomiting, hematemesis, diarrhea, constipation, melena or bright red blood.  GENITOURINARY: No dysuria, nocturia, hematuria, or urinary incontinence  NEUROLOGICAL: No headaches, memory loss, slurred speech, limb weakness, loss of strength, numbness, or tremors  MUSCULOSKELETAL: No joint pain or swelling, muscle, back, or extremity pain          Vital Signs Last 24 Hrs  T(C): 36.7 (22 Feb 2023 14:39), Max: 36.7 (22 Feb 2023 14:39)  T(F): 98 (22 Feb 2023 14:39), Max: 98 (22 Feb 2023 14:39)  HR: 85 (22 Feb 2023 14:39) (85 - 85)  BP: 139/99 (22 Feb 2023 14:39) (139/99 - 139/99)  BP(mean): --  RR: 18 (22 Feb 2023 14:39) (18 - 18)  SpO2: 98% (22 Feb 2023 14:39) (98% - 98%)    Parameters below as of 22 Feb 2023 14:39  Patient On (Oxygen Delivery Method): room air        PHYSICAL EXAM:  GENERAL: Well appearing, speaking in full sentence, in NAD  HEART: S1S2 RRR  PULMONARY:CTABL, normal respiratory effort  ABDOMEN: Bowel sounds present, soft  EXTREMITIES:  Warm, well -perfused, no pedal edema, distal pulses present  NEUROLOGICAL:AOx3     INTERPRETATION OF TELEMETRY: SR with HR 77    ECG:    I&O's Detail      LABS:    < from: Transthoracic Echocardiogram (06.29.21 @ 11:20) >  Dimensions:    Normal Values:  LA:     3.4    2.0 - 4.0 cm  Ao:     2.9    2.0- 3.8 cm  SEPTUM: 0.9    0.6 - 1.2 cm  PWT:    1.0    0.6 - 1.1 cm  LVIDd:  5.4    3.0 - 5.6 cm  LVIDs:  4.6    1.8 - 4.0 cm  Derived variables:  LVMI: 107 g/m2  RWT: 0.37  Fractional short: 15 %  EF (Bates Rule): 37 %  ------------------------------------------------------------------------  Observations:  Mitral Valve: Tethered mitral valve leaflets with normal  opening. Mild-moderate mitral regurgitation.  Aortic Valve/Aorta: Calcified trileaflet aortic valve with  normal opening. Mild aortic regurgitation.  Aortic Root: 2.9 cm.  Left Atrium: Mildly dilated left atrium.  LA volume index =  36 cc/m2.  Left Ventricle: Moderate global  left ventricular systolic  dysfunction. Normal left ventricular internal dimensions  and wall thicknesses.  Right Heart: Normal right atrium. Normal right ventricular  size and systolic function. Normal tricuspid valve. Mild  tricuspid regurgitation. Normal pulmonic valve.  Pericardium/Pleura: Normal pericardium with no pericardial  effusion.  Hemodynamic: Estimated right ventricular systolic pressure  equals 19 mm Hg, assuming right atrial pressure equals 3 mm  Hg, consistent with normal pulmonary pressures.  ------------------------------------------------------------------------  Conclusions:  1. Tethered mitral valve leaflets with normal opening.  Mild-moderate mitral regurgitation.  2. Calcified trileaflet aortic valve with normal opening.  Mild aortic regurgitation.  3. Mildly dilated left atrium.  LA volume index = 36 cc/m2.  4. Moderate global  left ventricular systolic dysfunction.  5. Normal right ventricular size and systolic function.  *** No previous Echo exam.  ------------------------------------------------------------------------  Confirmed on  7/1/2021 - 14:14:12 by JEANCARLOS Chew  ------------------------------------------------------------------------    < end of copied text >        RADIOLOGY & ADDITIONAL STUDIES:     Source: patient and Chart    HPI:  This is 71 year old man with pmhx of NICM, HF (LVEF 35-40% with GDMT from 25-30% ) with severe functional MR, uncontrolled HTN, and CKD III (baseline Cr 1.6), and seizure/cardiac arrest and was hospitalized at [facility] 2/19/2022 to 1/6/2023 and course c/b a coma for 3 days per the niece, Maddy who was sent to [facility] for ICD evaluation.     According the patient, he was at [facility] since 1/6/2023 and was discharge on 2/20/2023. He was told by the rehab to make an appointment with his HF doctor. Dr. Charles. Patient had an appointment with him today and was recommended to come to [facility] for an ICD evaluation. He is able to walk 1-2 block and can walk up 2-3 flights of stairs with his cane with SOB. Patient admitted to SOB since 2/2020 but stated that it had improved. His Holter monitor in 9/2020 showed a 5% PVC burden, HR  bpm, no NSVT. TTE from 6/29/2021 showed an EF 35-40% with mild global hypokinesis, mild-moderate MR, mild AI, normal RV size/function, IVC small/collapsing, estimated PASP 18 mmHg. LHC form 3/2020 was minimal non-obstructive CAD. Patient stated that he is complaint with his medication and that he is on Coreg 25 mg po qd, Jardiance 10mg, hydralazine 25mg po qd, and Lasix 40mg qd and stated that he is no longer taking Entresto which was stopped at [facility]. Patient denied fever, chills, diaphoresis, HA, lightheadedness, dizziness, changes in visions, cold like symptoms  (sore throat, cough, conjunctivitis runny nose), CP, palpitation, abdominal pain, n/v/d, hematuria, melena, hematochezia, LE edema, numbness and tingling.     PAST MEDICAL & SURGICAL HISTORY:  HTN (hypertension)  Chronic kidney disease (CKD)    No significant past surgical history    MEDICATIONS  (STANDING):    MEDICATIONS  (PRN):      FAMILY HISTORY:  No pertinent family history in first degree relatives    SOCIAL HISTORY:  LIVING SITUATION: Lives with his niece  CIGARETTES: Former smoker, smoked 20 year 1/2 pack per day and quit 20 year ago  ALCOHOL: denied   ILLICIT DRUG USES: denied    REVIEW OF SYSTEMS:  CONSTITUTIONAL: No fever, weight loss, chills, shakes, or fatigue  RESPIRATORY: per HPI  CARDIOVASCULAR: per HPI  GASTROINTESTINAL: No abdominal  or epigastric pain, nausea, vomiting, hematemesis, diarrhea, constipation, melena or bright red blood.  GENITOURINARY: No dysuria, nocturia, hematuria, or urinary incontinence  NEUROLOGICAL: No headaches, memory loss, slurred speech, limb weakness, loss of strength, numbness, or tremors  MUSCULOSKELETAL: No joint pain or swelling, muscle, back, or extremity pain          Vital Signs Last 24 Hrs  T(C): 36.7 (22 Feb 2023 14:39), Max: 36.7 (22 Feb 2023 14:39)  T(F): 98 (22 Feb 2023 14:39), Max: 98 (22 Feb 2023 14:39)  HR: 85 (22 Feb 2023 14:39) (85 - 85)  BP: 139/99 (22 Feb 2023 14:39) (139/99 - 139/99)  BP(mean): --  RR: 18 (22 Feb 2023 14:39) (18 - 18)  SpO2: 98% (22 Feb 2023 14:39) (98% - 98%)    Parameters below as of 22 Feb 2023 14:39  Patient On (Oxygen Delivery Method): room air        PHYSICAL EXAM:  GENERAL: Well appearing, speaking in full sentence, in NAD, patient was seen in the amb area in the ED  HEART: S1S2 RRR  PULMONARY:CTABL, normal respiratory effort  ABDOMEN: Bowel sounds present, soft  EXTREMITIES:  Warm, well -perfused, no pedal edema, distal pulses present  NEUROLOGICAL:AOx3     INTERPRETATION OF TELEMETRY: Patient not a telemetry    ECG:SR with HR 77    I&O's Detail      LABS:    < from: Transthoracic Echocardiogram (06.29.21 @ 11:20) >  Dimensions:    Normal Values:  LA:     3.4    2.0 - 4.0 cm  Ao:     2.9    2.0- 3.8 cm  SEPTUM: 0.9    0.6 - 1.2 cm  PWT:    1.0    0.6 - 1.1 cm  LVIDd:  5.4    3.0 - 5.6 cm  LVIDs:  4.6    1.8 - 4.0 cm  Derived variables:  LVMI: 107 g/m2  RWT: 0.37  Fractional short: 15 %  EF (Bates Rule): 37 %  ------------------------------------------------------------------------  Observations:  Mitral Valve: Tethered mitral valve leaflets with normal  opening. Mild-moderate mitral regurgitation.  Aortic Valve/Aorta: Calcified trileaflet aortic valve with  normal opening. Mild aortic regurgitation.  Aortic Root: 2.9 cm.  Left Atrium: Mildly dilated left atrium.  LA volume index =  36 cc/m2.  Left Ventricle: Moderate global  left ventricular systolic  dysfunction. Normal left ventricular internal dimensions  and wall thicknesses.  Right Heart: Normal right atrium. Normal right ventricular  size and systolic function. Normal tricuspid valve. Mild  tricuspid regurgitation. Normal pulmonic valve.  Pericardium/Pleura: Normal pericardium with no pericardial  effusion.  Hemodynamic: Estimated right ventricular systolic pressure  equals 19 mm Hg, assuming right atrial pressure equals 3 mm  Hg, consistent with normal pulmonary pressures.  ------------------------------------------------------------------------  Conclusions:  1. Tethered mitral valve leaflets with normal opening.  Mild-moderate mitral regurgitation.  2. Calcified trileaflet aortic valve with normal opening.  Mild aortic regurgitation.  3. Mildly dilated left atrium.  LA volume index = 36 cc/m2.  4. Moderate global  left ventricular systolic dysfunction.  5. Normal right ventricular size and systolic function.  *** No previous Echo exam.  ------------------------------------------------------------------------  Confirmed on  7/1/2021 - 14:14:12 by JEANCARLOS Chew  ------------------------------------------------------------------------    < end of copied text >        RADIOLOGY & ADDITIONAL STUDIES:     Source: patient and Chart    HPI:  This is 71 year old man with pmhx of NICM, HF (LVEF 35-40% with GDMT from 25-30% ) with severe functional MR, uncontrolled HTN, and CKD III (baseline Cr 1.6), and seizure/cardiac arrest and was hospitalized at [facility] 2/19/2022 to 1/6/2023 and course c/b a coma for 3 days per the niece, Maddy who was sent to [facility] for ICD evaluation.     According the patient, he was at [facility] since 1/6/2023 and was discharge on 2/20/2023. He was told by the rehab to make an appointment with his HF doctor. Dr. Charles. Patient had an appointment with him today and was recommended to come to [facility] for an ICD evaluation. He is able to walk 1-2 block and can walk up 2-3 flights of stairs with his cane with SOB. Patient admitted to SOB since 2/2020 but stated that it had improved. His Holter monitor in 9/2020 showed a 5% PVC burden, HR  bpm, no NSVT. TTE from 6/29/2021 showed an EF 35-40% with mild global hypokinesis, mild-moderate MR, mild AI, normal RV size/function, IVC small/collapsing, estimated PASP 18 mmHg. LHC form 3/2020 was minimal non-obstructive CAD. Patient stated that he is complaint with his medication and that he is on Coreg 25 mg po qd, Jardiance 10mg, hydralazine 25mg po qd, and Lasix 40mg qd and stated that he is no longer taking Entresto which was stopped at [facility]. Patient denied fever, chills, diaphoresis, HA, lightheadedness, dizziness, changes in visions, cold like symptoms  (sore throat, cough, conjunctivitis runny nose), CP, palpitation, abdominal pain, n/v/d, hematuria, melena, hematochezia, LE edema, numbness and tingling. Of note, patient was seen by Dr. Duncan in 9/21/2020 and was recommended ICD for primary prevention of sudden cardiac death given his severe LV dysfunction despite being on GDMT for more than 3 months.  Ep was consulted for ICD evaluation. ICD therapy is recommended for secondary prevention. The alterative, risks, and benefits was explained in detail which included but not limited to bleeding requiring transfusion, infection, stroke, plural effusion, esophageal injury, pericardial effusion, cardiac tamponade requiring chest tube, intubation, and death. I also explained what it means to live with an ICD. Patient and his niece expressed understanding and all questions were answered. Patient is consented for ICD.     PAST MEDICAL & SURGICAL HISTORY:  HTN (hypertension)  Chronic kidney disease (CKD)    No significant past surgical history    MEDICATIONS  (STANDING):    MEDICATIONS  (PRN):      FAMILY HISTORY:  No pertinent family history in first degree relatives    SOCIAL HISTORY:  LIVING SITUATION: Lives with his niece  CIGARETTES: Former smoker, smoked 20 year 1/2 pack per day and quit 20 year ago  ALCOHOL: denied   ILLICIT DRUG USES: denied    REVIEW OF SYSTEMS:  CONSTITUTIONAL: No fever, weight loss, chills, shakes, or fatigue  RESPIRATORY: per HPI  CARDIOVASCULAR: per HPI  GASTROINTESTINAL: No abdominal  or epigastric pain, nausea, vomiting, hematemesis, diarrhea, constipation, melena or bright red blood.  GENITOURINARY: No dysuria, nocturia, hematuria, or urinary incontinence  NEUROLOGICAL: No headaches, memory loss, slurred speech, limb weakness, loss of strength, numbness, or tremors  MUSCULOSKELETAL: No joint pain or swelling, muscle, back, or extremity pain          Vital Signs Last 24 Hrs  T(C): 36.7 (22 Feb 2023 14:39), Max: 36.7 (22 Feb 2023 14:39)  T(F): 98 (22 Feb 2023 14:39), Max: 98 (22 Feb 2023 14:39)  HR: 85 (22 Feb 2023 14:39) (85 - 85)  BP: 139/99 (22 Feb 2023 14:39) (139/99 - 139/99)  BP(mean): --  RR: 18 (22 Feb 2023 14:39) (18 - 18)  SpO2: 98% (22 Feb 2023 14:39) (98% - 98%)    Parameters below as of 22 Feb 2023 14:39  Patient On (Oxygen Delivery Method): room air        PHYSICAL EXAM:  GENERAL: Well appearing, speaking in full sentence, in NAD, patient was seen in the amb area in the ED  HEART: S1S2 RRR  PULMONARY:CTABL, normal respiratory effort  ABDOMEN: Bowel sounds present, soft  EXTREMITIES:  Warm, well -perfused, no pedal edema, distal pulses present  NEUROLOGICAL:AOx3     INTERPRETATION OF TELEMETRY: Patient not a telemetry    ECG:SR with HR 77    I&O's Detail      LABS:    < from: Transthoracic Echocardiogram (06.29.21 @ 11:20) >  Dimensions:    Normal Values:  LA:     3.4    2.0 - 4.0 cm  Ao:     2.9    2.0- 3.8 cm  SEPTUM: 0.9    0.6 - 1.2 cm  PWT:    1.0    0.6 - 1.1 cm  LVIDd:  5.4    3.0 - 5.6 cm  LVIDs:  4.6    1.8 - 4.0 cm  Derived variables:  LVMI: 107 g/m2  RWT: 0.37  Fractional short: 15 %  EF (Bates Rule): 37 %  ------------------------------------------------------------------------  Observations:  Mitral Valve: Tethered mitral valve leaflets with normal  opening. Mild-moderate mitral regurgitation.  Aortic Valve/Aorta: Calcified trileaflet aortic valve with  normal opening. Mild aortic regurgitation.  Aortic Root: 2.9 cm.  Left Atrium: Mildly dilated left atrium.  LA volume index =  36 cc/m2.  Left Ventricle: Moderate global  left ventricular systolic  dysfunction. Normal left ventricular internal dimensions  and wall thicknesses.  Right Heart: Normal right atrium. Normal right ventricular  size and systolic function. Normal tricuspid valve. Mild  tricuspid regurgitation. Normal pulmonic valve.  Pericardium/Pleura: Normal pericardium with no pericardial  effusion.  Hemodynamic: Estimated right ventricular systolic pressure  equals 19 mm Hg, assuming right atrial pressure equals 3 mm  Hg, consistent with normal pulmonary pressures.  ------------------------------------------------------------------------  Conclusions:  1. Tethered mitral valve leaflets with normal opening.  Mild-moderate mitral regurgitation.  2. Calcified trileaflet aortic valve with normal opening.  Mild aortic regurgitation.  3. Mildly dilated left atrium.  LA volume index = 36 cc/m2.  4. Moderate global  left ventricular systolic dysfunction.  5. Normal right ventricular size and systolic function.  *** No previous Echo exam.  ------------------------------------------------------------------------  Confirmed on  7/1/2021 - 14:14:12 by JEANCARLOS Chew  ------------------------------------------------------------------------    < end of copied text >        RADIOLOGY & ADDITIONAL STUDIES:     Source: patient and Chart    HPI:  This is 71 year old man with pmhx of NICM, HF (LVEF 35-40% with GDMT from 25-30% ) with severe functional MR, uncontrolled HTN, and CKD III (baseline Cr 1.6), and seizure/cardiac arrest and was hospitalized at [facility] 2/19/2022 to 1/6/2023 and course c/b a coma for 3 days per the niece, Maddy who was sent to [facility] for ICD evaluation.     According the patient, he was at [facility] since 1/6/2023 and was discharge on 2/20/2023. He was told by the rehab to make an appointment with his HF doctor. Dr. Charles. Patient had an appointment with him today and was recommended to come to [facility] for an ICD evaluation. He is able to walk 1-2 block and can walk up 2-3 flights of stairs with his cane with SOB. Patient admitted to SOB since 2/2020 but stated that it had improved. His Holter monitor in 9/2020 showed a 5% PVC burden, HR  bpm, no NSVT. TTE from 6/29/2021 showed an EF 35-40% with mild global hypokinesis, mild-moderate MR, mild AI, normal RV size/function, IVC small/collapsing, estimated PASP 18 mmHg. LHC form 3/2020 was minimal non-obstructive CAD. Patient stated that he is complaint with his medication and that he is on Coreg 25 mg po qd, Jardiance 10mg, hydralazine 25mg po qd, and Lasix 40mg qd and stated that he is no longer taking Entresto which was stopped at [facility]. Patient denied fever, chills, diaphoresis, HA, lightheadedness, dizziness, changes in visions, cold like symptoms  (sore throat, cough, conjunctivitis runny nose), CP, palpitation, abdominal pain, n/v/d, hematuria, melena, hematochezia, LE edema, numbness and tingling. Of note, patient was seen by Dr. Duncan in 9/21/2020 and was recommended ICD for primary prevention of sudden cardiac death given his severe LV dysfunction despite being on GDMT for more than 3 months.  Ep was consulted for ICD evaluation. ICD therapy is recommended for secondary prevention. The alterative, risks, and benefits was explained in detail which included but not limited to bleeding requiring transfusion, infection, stroke, plural effusion, esophageal injury, pericardial effusion, cardiac tamponade requiring chest tube, intubation, and death. I also explained what it means to live with an ICD. Patient and his niece expressed understanding and all questions were answered. Patient is consented for ICD.     PAST MEDICAL & SURGICAL HISTORY:  HTN (hypertension)  Chronic kidney disease (CKD)    No significant past surgical history    MEDICATIONS  (STANDING):    MEDICATIONS  (PRN):      FAMILY HISTORY:  No pertinent family history in first degree relatives    SOCIAL HISTORY:  LIVING SITUATION: Lives with his niece  CIGARETTES: Former smoker, smoked 20 year 1/2 pack per day and quit 20 year ago  ALCOHOL: denied   ILLICIT DRUG USES: denied    REVIEW OF SYSTEMS:  CONSTITUTIONAL: No fever, weight loss, chills, shakes, or fatigue  RESPIRATORY: per HPI  CARDIOVASCULAR: per HPI  GASTROINTESTINAL: No abdominal  or epigastric pain, nausea, vomiting, hematemesis, diarrhea, constipation, melena or bright red blood.  GENITOURINARY: No dysuria, nocturia, hematuria, or urinary incontinence  NEUROLOGICAL: No headaches, memory loss, slurred speech, limb weakness, loss of strength, numbness, or tremors  MUSCULOSKELETAL: No joint pain or swelling, muscle, back, or extremity pain          Vital Signs Last 24 Hrs  T(C): 36.7 (22 Feb 2023 14:39), Max: 36.7 (22 Feb 2023 14:39)  T(F): 98 (22 Feb 2023 14:39), Max: 98 (22 Feb 2023 14:39)  HR: 85 (22 Feb 2023 14:39) (85 - 85)  BP: 139/99 (22 Feb 2023 14:39) (139/99 - 139/99)  BP(mean): --  RR: 18 (22 Feb 2023 14:39) (18 - 18)  SpO2: 98% (22 Feb 2023 14:39) (98% - 98%)    Parameters below as of 22 Feb 2023 14:39  Patient On (Oxygen Delivery Method): room air        PHYSICAL EXAM:  GENERAL: Well appearing, speaking in full sentence, in NAD, patient was seen in the amb area in the ED  HEART: S1S2 RRR  PULMONARY:CTABL, normal respiratory effort  ABDOMEN: Bowel sounds present, soft  EXTREMITIES:  Warm, well -perfused, no pedal edema, distal pulses present  NEUROLOGICAL:AOx3     INTERPRETATION OF TELEMETRY: Patient is not on telemetry    ECG:SR with HR 77    I&O's Detail      LABS:    < from: Transthoracic Echocardiogram (06.29.21 @ 11:20) >  Dimensions:    Normal Values:  LA:     3.4    2.0 - 4.0 cm  Ao:     2.9    2.0- 3.8 cm  SEPTUM: 0.9    0.6 - 1.2 cm  PWT:    1.0    0.6 - 1.1 cm  LVIDd:  5.4    3.0 - 5.6 cm  LVIDs:  4.6    1.8 - 4.0 cm  Derived variables:  LVMI: 107 g/m2  RWT: 0.37  Fractional short: 15 %  EF (Bates Rule): 37 %  ------------------------------------------------------------------------  Observations:  Mitral Valve: Tethered mitral valve leaflets with normal  opening. Mild-moderate mitral regurgitation.  Aortic Valve/Aorta: Calcified trileaflet aortic valve with  normal opening. Mild aortic regurgitation.  Aortic Root: 2.9 cm.  Left Atrium: Mildly dilated left atrium.  LA volume index =  36 cc/m2.  Left Ventricle: Moderate global  left ventricular systolic  dysfunction. Normal left ventricular internal dimensions  and wall thicknesses.  Right Heart: Normal right atrium. Normal right ventricular  size and systolic function. Normal tricuspid valve. Mild  tricuspid regurgitation. Normal pulmonic valve.  Pericardium/Pleura: Normal pericardium with no pericardial  effusion.  Hemodynamic: Estimated right ventricular systolic pressure  equals 19 mm Hg, assuming right atrial pressure equals 3 mm  Hg, consistent with normal pulmonary pressures.  ------------------------------------------------------------------------  Conclusions:  1. Tethered mitral valve leaflets with normal opening.  Mild-moderate mitral regurgitation.  2. Calcified trileaflet aortic valve with normal opening.  Mild aortic regurgitation.  3. Mildly dilated left atrium.  LA volume index = 36 cc/m2.  4. Moderate global  left ventricular systolic dysfunction.  5. Normal right ventricular size and systolic function.  *** No previous Echo exam.  ------------------------------------------------------------------------  Confirmed on  7/1/2021 - 14:14:12 by JEANCARLOS Chew  ------------------------------------------------------------------------    < end of copied text >        RADIOLOGY & ADDITIONAL STUDIES:

## 2023-02-22 NOTE — CARDIOLOGY SUMMARY
[de-identified] : 2/22/23 NSR 76, LVH, NSST\par \par 10/19/22 - NSR, LVH, LAE\par \par 6/10/22 NSR 62, LVH with strain pattern\par \par 9/21/20: SR, LVH with strain pattern   [de-identified] : \par Holter 9/2020: 5% PVC burden, HR  bpm, no NSVT\par  [de-identified] : \par TTE 6/29/2021: LV 5.4 cm, LVEF 35-40% with mild global hypokinesis, mild-moderate MR, mild AI, normal RV size/function, IVC small/collapsing, estimated PASP 18 mmHg\par \par TTE 9/3/20: LV 6.6 cm, mild concentric LVH, LVEF 20-25%, LVOT VTI 12 cm, restrictive diastolic filling with E/A 3.3 and e' velocities in 3-5 range, normal RV size/function, severe mitral regurgitation, mild TR, mild-moderate AI\par  [de-identified] : \par Cardiac MRI 11/2020:  ml with elevated LV mas index of 95 g/m2, LVEF 22%, normal RV size/function, linear mesocardial LGE along septum and anterior wall c/w nonischemic cardiomyopathy  [de-identified] : \par [facility] 3/2020: minimal non-obstructive CAD\par RH 3/2020: RA 1, PA 43/14 (27), PCWP 18mmHg, LVEDP 15 mmHg, Lissa CO/CI 3.8/2.2\par

## 2023-02-22 NOTE — PHYSICAL EXAM
[Well Developed] : well developed [Well Nourished] : well nourished [No Acute Distress] : no acute distress [Normal S1, S2] : normal S1, S2 [No Murmur] : no murmur [No Rub] : no rub [No Gallop] : no gallop [Clear Lung Fields] : clear lung fields [No Respiratory Distress] : no respiratory distress  [Soft] : abdomen soft [Non Tender] : non-tender [Normal Gait] : normal gait [No Edema] : no edema [Normal Radial B/L] : normal radial B/L [Moves all extremities] : moves all extremities [No Focal Deficits] : no focal deficits [Normal Speech] : normal speech [Alert and Oriented] : alert and oriented [Normal memory] : normal memory [de-identified] : JVP 8 cm H2O, no HJR [de-identified] : abdomianl yovani [de-identified] : Warm peripherally

## 2023-02-22 NOTE — H&P ADULT - HISTORY OF PRESENT ILLNESS
71yo man with MHx of NICM, severe sys HF (LVEF 35-40% with GDMT from 25-30% ), severe functional MR, HTN, and CKD III (baseline Cr 1.6),  seizure/cardiac arrest, prolonged hospitalizzation at [facility] 2/19/2022 to 1/6/2023 course c/b a coma (per the niece, Maddy) Per the patient, he was at [facility] since 1/6/2023 and was discharge on 2/20/2023. He was told by the rehab to make an appointment with his HF doctor. Dr. Charles. Patient had an appointment with him today and was recommended to come to [facility] for an ICD evaluation. He is able to walk 1-2 block and can walk up 2-3 flights of stairs with his cane with SOB. States that no longer on Entresto - stopped at [facility]. Reports no fever, chills, diaphoresis, HA, lightheadedness, dizziness, changes in visions, CP, palpitation, abdominal pain, n/v/d, hematuria, melena, hematochezia  Of note, patient was seen by Dr. Duncan in 9/21/2020 and was recommended ICD for primary prevention of sudden cardiac death given his severe LV dysfunction despite being on GDMT for more than 3 months.    ED course: EP was consulted for ICD evaluation.  73yo man with MHx of NICM, severe sys HF (LVEF 35-40% with GDMT from 25-30% ), severe functional MR, HTN, and CKD III (baseline Cr 1.6),  seizure/cardiac arrest, prolonged hospitalization at [facility] 2/19/2022 to 1/6/2023 course c/b a coma (per the niece, Maddy) Per the patient, he was at [facility] since 1/6/2023 and was discharge on 2/20/2023. He was told by the rehab to make an appointment with his HF doctor. Dr. Charles. Patient had an appointment with him today and was recommended to come to [facility] for an ICD evaluation. He is able to walk 1-2 block and can walk up 2-3 flights of stairs with his cane with SOB. States that no longer on Entresto - stopped at [facility]. Reports no fever, chills, diaphoresis, HA, lightheadedness, dizziness, changes in visions, CP, palpitation, abdominal pain, n/v/d, hematuria, melena, hematochezia  Of note, patient was seen by Dr. Duncan in 9/21/2020 and was recommended ICD for primary prevention of sudden cardiac death given his severe LV dysfunction despite being on GDMT for more than 3 months.    ED course: EP was consulted for ICD evaluation.  71yo man with MHx of NICM, severe sys HF (LVEF 35-40% with GDMT from 25-30% ), severe functional MR, HTN, and CKD III (baseline Cr 1.6),  seizure/cardiac arrest, prolonged hospitalization at [facility] 12/19/2022 was d/c to rehab on 1/6/2023; course was c/b a coma (per [employer] review) Per the patient, he was at [facility] since 1/6/2023 and was discharged home on 2/20/2023. He was told by the rehab to make an appointment with his HF doctor. Dr. Charles. Patient had an appointment with him today and was recommended to come to [facility] for an ICD evaluation. He is able to walk 1 block due to BARROW and can walk up 2-3 flights of stairs with his cane before getting SOB. Sleeps with 2 pillows at baseline;  States that no longer on Entresto - stopped at [facility]. Reports no fever, chills, diaphoresis, HA, lightheadedness, dizziness, changes in visions, CP, palpitation, abdominal pain, n/v/d, hematuria, melena, hematochezia  Of note, patient was seen by Dr. Duncan in 9/21/2020 and was recommended ICD for primary prevention of sudden cardiac death given his severe LV dysfunction despite being on GDMT for more than 3 months.    ED course: EP was consulted for ICD evaluation.

## 2023-02-22 NOTE — ED ADULT TRIAGE NOTE - CHIEF COMPLAINT QUOTE
p/t with hx CHF, Renal insufficiency sent from cardiology clinic for eval, increased sob and swelling to BLE

## 2023-02-22 NOTE — H&P ADULT - PROBLEM SELECTOR PLAN 2
Moderate global  left ventricular systolic dysfunction per TTE 2021  Dilated LV with diffuse hypokinesis. Left ventricular ejection fraction measures 22%. Linear, mid-myocardial enhancement noted along the septum and anterior wall. Dilated cardiomyopathy per Cardiac MRI   -EP formal evaluation for implantation of ICD pending  -TTE ordered  -Telemetry  -Daily weight, strict I/Os, Fluid restriction 1.5L/day   -c/w Lasix PO pending CHF team recs   -c/w Carvedilol BID   -Clarify with CHF team if also to be on Metoprolol ER (held at this time)  -c/w DAPT, Statin   -Jardiance for CHF held while inpt

## 2023-02-22 NOTE — ED PROVIDER NOTE - CLINICAL SUMMARY MEDICAL DECISION MAKING FREE TEXT BOX
72-year-old man with history of hypertension, CKD, recent long-term ICU admission status postcardiac arrest and CVA referred by cardiologist for evaluation of ICD placement admission.  Last recorded echo from prior notes notable for an ejection fraction of 37% 2 years ago, likely worsening since that time.  Plan for labs, EKG, and inpatient admission with cardiology evaluation.

## 2023-02-22 NOTE — H&P ADULT - PROBLEM SELECTOR PLAN 1
Multifactorial due chronic sys CHF and to recent hospitalization deconditioning (s/p rehab); Ambulates with a cane;  Pro-BNP not suggestive of acute fluid overload; Minimal trace LE edema;  -CXR: clear lungs   - Multifactorial due chronic sys CHF and to recent hospitalization deconditioning (s/p rehab); Ambulates with a cane;  Pro-BNP not suggestive of acute fluid overload; Minimal trace LE edema;  -CXR: clear lungs   -ProBNP 229, Trop 18-->16, low suspicion of ACS  -TTE ordered   -Formal CHF team for HF management in AM (per EP recs)   -VS q6h  -Plan as below   -PT eval   -TSH, Lipid panel, A1c in AM  -Fall, aspiration precautions

## 2023-02-23 ENCOUNTER — TRANSCRIPTION ENCOUNTER (OUTPATIENT)
Age: 73
End: 2023-02-23

## 2023-02-23 DIAGNOSIS — R06.09 OTHER FORMS OF DYSPNEA: ICD-10-CM

## 2023-02-23 DIAGNOSIS — I50.22 CHRONIC SYSTOLIC (CONGESTIVE) HEART FAILURE: ICD-10-CM

## 2023-02-23 DIAGNOSIS — Z29.9 ENCOUNTER FOR PROPHYLACTIC MEASURES, UNSPECIFIED: ICD-10-CM

## 2023-02-23 LAB
A1C WITH ESTIMATED AVERAGE GLUCOSE RESULT: 5.6 % — SIGNIFICANT CHANGE UP (ref 4–5.6)
ANION GAP SERPL CALC-SCNC: 9 MMOL/L — SIGNIFICANT CHANGE UP (ref 7–14)
BUN SERPL-MCNC: 19 MG/DL — SIGNIFICANT CHANGE UP (ref 7–23)
CALCIUM SERPL-MCNC: 9.3 MG/DL — SIGNIFICANT CHANGE UP (ref 8.4–10.5)
CHLORIDE SERPL-SCNC: 106 MMOL/L — SIGNIFICANT CHANGE UP (ref 98–107)
CHOLEST SERPL-MCNC: 72 MG/DL — SIGNIFICANT CHANGE UP
CO2 SERPL-SCNC: 23 MMOL/L — SIGNIFICANT CHANGE UP (ref 22–31)
CREAT SERPL-MCNC: 1.53 MG/DL — HIGH (ref 0.5–1.3)
EGFR: 48 ML/MIN/1.73M2 — LOW
ESTIMATED AVERAGE GLUCOSE: 114 — SIGNIFICANT CHANGE UP
GLUCOSE SERPL-MCNC: 103 MG/DL — HIGH (ref 70–99)
HDLC SERPL-MCNC: 34 MG/DL — LOW
LIPID PNL WITH DIRECT LDL SERPL: 14 MG/DL — SIGNIFICANT CHANGE UP
MAGNESIUM SERPL-MCNC: 2.1 MG/DL — SIGNIFICANT CHANGE UP (ref 1.6–2.6)
NON HDL CHOLESTEROL: 38 MG/DL — SIGNIFICANT CHANGE UP
PHOSPHATE SERPL-MCNC: 3.2 MG/DL — SIGNIFICANT CHANGE UP (ref 2.5–4.5)
POTASSIUM SERPL-MCNC: 4.4 MMOL/L — SIGNIFICANT CHANGE UP (ref 3.5–5.3)
POTASSIUM SERPL-SCNC: 4.4 MMOL/L — SIGNIFICANT CHANGE UP (ref 3.5–5.3)
SODIUM SERPL-SCNC: 138 MMOL/L — SIGNIFICANT CHANGE UP (ref 135–145)
TRIGL SERPL-MCNC: 119 MG/DL — SIGNIFICANT CHANGE UP
TSH SERPL-MCNC: 0.79 UIU/ML — SIGNIFICANT CHANGE UP (ref 0.27–4.2)

## 2023-02-23 PROCEDURE — 93306 TTE W/DOPPLER COMPLETE: CPT | Mod: 26

## 2023-02-23 PROCEDURE — 99223 1ST HOSP IP/OBS HIGH 75: CPT

## 2023-02-23 PROCEDURE — 99233 SBSQ HOSP IP/OBS HIGH 50: CPT

## 2023-02-23 PROCEDURE — 99232 SBSQ HOSP IP/OBS MODERATE 35: CPT

## 2023-02-23 RX ORDER — ACETAMINOPHEN 500 MG
650 TABLET ORAL EVERY 6 HOURS
Refills: 0 | Status: DISCONTINUED | OUTPATIENT
Start: 2023-02-23 | End: 2023-03-01

## 2023-02-23 RX ORDER — CLOPIDOGREL BISULFATE 75 MG/1
75 TABLET, FILM COATED ORAL DAILY
Refills: 0 | Status: DISCONTINUED | OUTPATIENT
Start: 2023-02-24 | End: 2023-03-01

## 2023-02-23 RX ORDER — HYDRALAZINE HCL 50 MG
25 TABLET ORAL EVERY 8 HOURS
Refills: 0 | Status: DISCONTINUED | OUTPATIENT
Start: 2023-02-23 | End: 2023-03-01

## 2023-02-23 RX ORDER — HYDRALAZINE HCL 50 MG
1 TABLET ORAL
Qty: 0 | Refills: 0 | DISCHARGE

## 2023-02-23 RX ORDER — HYDRALAZINE HCL 50 MG
0 TABLET ORAL
Qty: 0 | Refills: 0 | DISCHARGE

## 2023-02-23 RX ORDER — CARVEDILOL PHOSPHATE 80 MG/1
25 CAPSULE, EXTENDED RELEASE ORAL EVERY 12 HOURS
Refills: 0 | Status: DISCONTINUED | OUTPATIENT
Start: 2023-02-23 | End: 2023-03-01

## 2023-02-23 RX ORDER — LEVETIRACETAM 250 MG/1
500 TABLET, FILM COATED ORAL
Refills: 0 | Status: DISCONTINUED | OUTPATIENT
Start: 2023-02-23 | End: 2023-03-01

## 2023-02-23 RX ORDER — CLOPIDOGREL BISULFATE 75 MG/1
0 TABLET, FILM COATED ORAL
Qty: 0 | Refills: 0 | DISCHARGE

## 2023-02-23 RX ORDER — ATORVASTATIN CALCIUM 80 MG/1
40 TABLET, FILM COATED ORAL AT BEDTIME
Refills: 0 | Status: DISCONTINUED | OUTPATIENT
Start: 2023-02-23 | End: 2023-03-01

## 2023-02-23 RX ORDER — EMPAGLIFLOZIN 10 MG/1
1 TABLET, FILM COATED ORAL
Qty: 0 | Refills: 0 | DISCHARGE

## 2023-02-23 RX ORDER — METOPROLOL TARTRATE 50 MG
0 TABLET ORAL
Qty: 0 | Refills: 0 | DISCHARGE

## 2023-02-23 RX ORDER — SACUBITRIL AND VALSARTAN 24; 26 MG/1; MG/1
1 TABLET, FILM COATED ORAL
Refills: 0 | Status: DISCONTINUED | OUTPATIENT
Start: 2023-02-23 | End: 2023-03-01

## 2023-02-23 RX ORDER — CLOPIDOGREL BISULFATE 75 MG/1
1 TABLET, FILM COATED ORAL
Qty: 0 | Refills: 0 | DISCHARGE

## 2023-02-23 RX ORDER — LANOLIN ALCOHOL/MO/W.PET/CERES
3 CREAM (GRAM) TOPICAL AT BEDTIME
Refills: 0 | Status: DISCONTINUED | OUTPATIENT
Start: 2023-02-23 | End: 2023-03-01

## 2023-02-23 RX ORDER — FUROSEMIDE 40 MG
40 TABLET ORAL DAILY
Refills: 0 | Status: DISCONTINUED | OUTPATIENT
Start: 2023-02-23 | End: 2023-02-23

## 2023-02-23 RX ORDER — ATORVASTATIN CALCIUM 80 MG/1
0 TABLET, FILM COATED ORAL
Qty: 0 | Refills: 0 | DISCHARGE

## 2023-02-23 RX ORDER — LEVETIRACETAM 250 MG/1
0 TABLET, FILM COATED ORAL
Qty: 0 | Refills: 0 | DISCHARGE

## 2023-02-23 RX ORDER — ASPIRIN/CALCIUM CARB/MAGNESIUM 324 MG
0 TABLET ORAL
Qty: 0 | Refills: 0 | DISCHARGE

## 2023-02-23 RX ORDER — LEVETIRACETAM 250 MG/1
1 TABLET, FILM COATED ORAL
Qty: 0 | Refills: 0 | DISCHARGE

## 2023-02-23 RX ORDER — ASPIRIN/CALCIUM CARB/MAGNESIUM 324 MG
81 TABLET ORAL DAILY
Refills: 0 | Status: DISCONTINUED | OUTPATIENT
Start: 2023-02-23 | End: 2023-03-01

## 2023-02-23 RX ADMIN — Medication 25 MILLIGRAM(S): at 05:47

## 2023-02-23 RX ADMIN — Medication 25 MILLIGRAM(S): at 21:05

## 2023-02-23 RX ADMIN — LEVETIRACETAM 500 MILLIGRAM(S): 250 TABLET, FILM COATED ORAL at 05:47

## 2023-02-23 RX ADMIN — LEVETIRACETAM 500 MILLIGRAM(S): 250 TABLET, FILM COATED ORAL at 18:25

## 2023-02-23 RX ADMIN — ATORVASTATIN CALCIUM 40 MILLIGRAM(S): 80 TABLET, FILM COATED ORAL at 21:06

## 2023-02-23 RX ADMIN — Medication 40 MILLIGRAM(S): at 05:47

## 2023-02-23 RX ADMIN — CARVEDILOL PHOSPHATE 25 MILLIGRAM(S): 80 CAPSULE, EXTENDED RELEASE ORAL at 05:47

## 2023-02-23 RX ADMIN — CARVEDILOL PHOSPHATE 25 MILLIGRAM(S): 80 CAPSULE, EXTENDED RELEASE ORAL at 18:25

## 2023-02-23 RX ADMIN — Medication 650 MILLIGRAM(S): at 16:06

## 2023-02-23 RX ADMIN — Medication 25 MILLIGRAM(S): at 15:38

## 2023-02-23 RX ADMIN — Medication 81 MILLIGRAM(S): at 15:35

## 2023-02-23 NOTE — PROGRESS NOTE ADULT - ASSESSMENT
This is 71 year old man with pmhx of NICM, HF (LVEF 35-40% with GDMT from 25-30% ) with severe functional MR, uncontrolled HTN, and CKD III (baseline Cr 1.6), and seizure/cardiac arrest and was hospitalized at [facility] 2/19/2022 to 1/6/2023 and course c/b a coma for 3 days per the nieceMaddy who was sent to [facility] for ICD evaluation. Of note, patient was seen by Dr. Duncan in 9/21/2020 and was recommended ICD for primary prevention of sudden cardiac death given his severe LV dysfunction despite being on GDMT for more than 3 months. Ep was consulted for ICD evaluation. ICD therapy is recommended for secondary prevention. The alterative, risks, and benefits was explained in detail which included but not limited to bleeding requiring transfusion, infection, stroke, plural effusion, esophageal injury, pericardial effusion, cardiac tamponade requiring chest tube, intubation, and death. I also explained what it means to live with an ICD. Patient and his niece expressed understanding and all questions were answered. Patient is consented for ICD.     Plan:  - Continuous telemetric monitoring  - Monitor electrolytes and replete K to 4 and Mg to 2  - TTE to assess for structural heart disease  - Consult HF for HF management  - Continue care per primary team    Celia Boogie PA-C  Patient to be staffed with attending. Please await attending addendum This is 71 year old man with pmhx of NICM, HF (LVEF 35-40% with GDMT from 25-30% ) with severe functional MR, uncontrolled HTN, and CKD III (baseline Cr 1.6), and seizure/cardiac arrest and was hospitalized at [facility] 2/19/2022 to 1/6/2023 and course c/b a coma for 3 days per the nieceMaddy who was sent to [facility] for ICD evaluation. Of note, patient was seen by Dr. Duncan in 9/21/2020 and was recommended ICD for primary prevention of sudden cardiac death given his severe LV dysfunction despite being on GDMT for more than 3 months. Ep was consulted for ICD evaluation. ICD therapy is recommended for secondary prevention. The alterative, risks, and benefits was explained in detail which included but not limited to bleeding requiring transfusion, infection, stroke, plural effusion, esophageal injury, pericardial effusion, cardiac tamponade requiring chest tube, intubation, and death. I also explained what it means to live with an ICD. Patient and his niece expressed understanding and all questions were answered. Patient is consented for ICD.     Plan:  - Continuous telemetric monitoring  - Monitor electrolytes and replete K to 4 and Mg to 2  - TTE to assess for structural heart disease  - Consult HF for HF management  - NPO after MN for ICD implantation on 2/24/2023  - COVID-19 negative 2/22/2023  - Continue care per primary team    Celia Boogie PA-C  Patient to be staffed with attending. Please await attending addendum

## 2023-02-23 NOTE — DISCHARGE NOTE PROVIDER - NSDCMRMEDTOKEN_GEN_ALL_CORE_FT
aspirin 81 mg oral delayed release tablet: 1 tab(s) orally once a day  ATORVASTATIN 40MG TAB:   CARVEDILOL 25MG TAB:   CLOPIDOGREL 75MG TAB: 1 tab(s) orally once a day  HYDRALAZINE 25MG TAB: 1 tab(s) orally every 8 hours  Jardiance 10 mg oral tablet: 1 tab(s) orally once a day (in the morning)  Lasix 40 mg oral tablet: 1 tab(s) orally once a day   LEVETIRACETA 500MG TAB: 1 tab(s) orally 2 times a day  METOPROL SUC 25MG ER TAB: 1 tab(s) orally once a day   acetaminophen 325 mg oral tablet: 2 tab(s) orally every 6 hours, As needed, Temp greater or equal to 38C (100.4F), Mild Pain (1 - 3)  aspirin 81 mg oral delayed release tablet: 1 tab(s) orally once a day  ATORVASTATIN 40MG TAB:   carvedilol 25 mg oral tablet: 1 tab(s) orally every 12 hours  CLOPIDOGREL 75MG TAB: 1 tab(s) orally once a day  HYDRALAZINE 25MG TAB: 1 tab(s) orally every 8 hours  Jardiance 10 mg oral tablet: 1 tab(s) orally once a day (in the morning)  Lasix 40 mg oral tablet: 1 tab(s) orally once a day   LEVETIRACETA 500MG TAB: 1 tab(s) orally 2 times a day  melatonin 3 mg oral tablet: 1 tab(s) orally once a day (at bedtime), As needed, Insomnia  sacubitril-valsartan 24 mg-26 mg oral tablet: 1 tab(s) orally 2 times a day    Please test for coverage   yuri [id number]   acetaminophen 325 mg oral tablet: 2 tab(s) orally every 6 hours, As needed, Temp greater or equal to 38C (100.4F), Mild Pain (1 - 3)  aspirin 81 mg oral delayed release tablet: 1 tab(s) orally once a day  ATORVASTATIN 40MG TAB:   carvedilol 25 mg oral tablet: 1 tab(s) orally every 12 hours  CLOPIDOGREL 75MG TAB: 1 tab(s) orally once a day  HYDRALAZINE 25MG TAB: 1 tab(s) orally every 8 hours  Jardiance 10 mg oral tablet: 1 tab(s) orally once a day (in the morning)  LEVETIRACETA 500MG TAB: 1 tab(s) orally 2 times a day  melatonin 3 mg oral tablet: 1 tab(s) orally once a day (at bedtime), As needed, Insomnia  sacubitril-valsartan 24 mg-26 mg oral tablet: 1 tab(s) orally 2 times a day    Please test for coverage   yuri [id number]   acetaminophen 325 mg oral tablet: 2 tab(s) orally every 6 hours, As needed, Temp greater or equal to 38C (100.4F), Mild Pain (1 - 3)  aspirin 81 mg oral delayed release tablet: 1 tab(s) orally once a day  ATORVASTATIN 40MG TAB:   carvedilol 25 mg oral tablet: 1 tab(s) orally every 12 hours  CLOPIDOGREL 75MG TAB: 1 tab(s) orally once a day  HYDRALAZINE 25MG TAB: 1 tab(s) orally every 8 hours  Jardiance 10 mg oral tablet: 1 tab(s) orally once a day (in the morning)  Lasix 40 mg oral tablet: 1 tab(s) orally once a day, As Needed -for shortness of breath and/or fluid retention  LEVETIRACETA 500MG TAB: 1 tab(s) orally 2 times a day  melatonin 3 mg oral tablet: 1 tab(s) orally once a day (at bedtime), As needed, Insomnia  oxyCODONE 5 mg oral tablet: 1 tab(s) orally every 8 hours, As Needed -for severe pain MDD:3 tablets (15mg)   sacubitril-valsartan 24 mg-26 mg oral tablet: 1 tab(s) orally 2 times a day    Please test for coverage   yuri [id number]   acetaminophen 325 mg oral tablet: 2 tab(s) orally every 6 hours, As needed, Temp greater or equal to 38C (100.4F), Mild Pain (1 - 3)  aspirin 81 mg oral delayed release tablet: 1 tab(s) orally once a day  ATORVASTATIN 40MG TAB:   carvedilol 25 mg oral tablet: 1 tab(s) orally every 12 hours  CLOPIDOGREL 75MG TAB: 1 tab(s) orally once a day  HYDRALAZINE 25MG TAB: 1 tab(s) orally every 8 hours  Jardiance 10 mg oral tablet: 1 tab(s) orally once a day (in the morning)  Lasix 40 mg oral tablet: 1 tab(s) orally once a day, As Needed -for shortness of breath and/or fluid retention  LEVETIRACETA 500MG TAB: 1 tab(s) orally 2 times a day  melatonin 3 mg oral tablet: 1 tab(s) orally once a day (at bedtime), As needed, Insomnia  oxyCODONE 5 mg oral tablet: 1 tab(s) orally 3 times a day, As Needed -for severe pain MDD:3 tabs (15 mg)   sacubitril-valsartan 24 mg-26 mg oral tablet: 1 tab(s) orally 2 times a day    Please test for coverage   yuri [id number]

## 2023-02-23 NOTE — CONSULT NOTE ADULT - PROBLEM SELECTOR RECOMMENDATION 9
Lasix 40mg daily; Please discontinue   Please restart Entresto 24/26mg BID  (hold SBP<90)  Coreg 25mg BID (hold SBP<90)  Hydralazine 25mg TID   Strict I/O  Daily standing weights  Monitor lytes replete K>4.0 and Mg>2.0  Management per primary team   Appreciate EP recommendations; Plan for ICD in AM  Pending final recommendations from HF attending Lasix 40mg daily; Please discontinue   Please restart Entresto 24/26mg BID  (hold SBP<90)  Coreg 25mg BID (hold SBP<90)  Hydralazine 25mg TID   Strict I/O  Daily standing weights  Monitor lytes replete K>4.0 and Mg>2.0  TTE completed; report above   Management per primary team   Appreciate EP recommendations; Plan for ICD in AM  Pending final recommendations from HF attending

## 2023-02-23 NOTE — PROGRESS NOTE ADULT - SUBJECTIVE AND OBJECTIVE BOX
Subjective: Denies HA, lightheadedness, dizziness, CP, palpitation, SOB, abdominal pain, and N/V.      Interval Events:  - Sent to [facility] for ICD evaluation by Dr. Charles his HF physician.  According the patient, he was at [facility] since 1/6/2023 and was discharge on 2/20/2023. He was told by the rehab to make an appointment with Dr. Charles. Patient had an appointment with Dr. Charles on 2/22/2023 and was recommended to come to [facility] for an ICD evaluation. Patient pro- BNp was 229 on admission.  -Of note, patient was seen by Dr. Duncan in 9/21/2020 and was recommended ICD for primary prevention of sudden cardiac death given his severe LV dysfunction despite being on GDMT for more than 3 months. His Holter monitor in 9/2020 showed a 5% PVC burden, HR  bpm, no NSVT. TTE from 6/29/2021 showed an EF 35-40% with mild global hypokinesis, mild-moderate MR, mild AI, normal RV size/function, IVC small/collapsing, estimated PASP 18 mmHg. LHC form 3/2020 was minimal non-obstructive CAD. Patient stated that he is complaint with his medication and that he is on Coreg 25 mg po qd, Jardiance 10mg, hydralazine 25mg po qd, and Lasix 40mg qd and stated that he is no longer taking Entresto which was stopped at [facility].   -  Ep was consulted for ICD evaluation. ICD therapy is recommended for secondary prevention. The alterative, risks, and benefits was explained in detail which included but not limited to bleeding requiring transfusion, infection, stroke, plural effusion, esophageal injury, pericardial effusion, cardiac tamponade requiring chest tube, intubation, and death. I also explained what it means to live with an ICD. Patient and his niece expressed understanding and all questions were answered. Patient is consented for ICD.     MEDICATIONS  (STANDING):  aspirin enteric coated 81 milliGRAM(s) Oral daily  atorvastatin 40 milliGRAM(s) Oral at bedtime  carvedilol 25 milliGRAM(s) Oral every 12 hours  furosemide    Tablet 40 milliGRAM(s) Oral daily  hydrALAZINE 25 milliGRAM(s) Oral every 8 hours  levETIRAcetam 500 milliGRAM(s) Oral two times a day    MEDICATIONS  (PRN):  acetaminophen     Tablet .. 650 milliGRAM(s) Oral every 6 hours PRN Temp greater or equal to 38C (100.4F), Mild Pain (1 - 3)  melatonin 3 milliGRAM(s) Oral at bedtime PRN Insomnia      Vital Signs Last 24 Hrs  T(C): 36.4 (23 Feb 2023 06:56), Max: 36.7 (22 Feb 2023 14:39)  T(F): 97.6 (23 Feb 2023 06:56), Max: 98.1 (22 Feb 2023 18:09)  HR: 64 (23 Feb 2023 08:40) (64 - 90)  BP: 103/72 (23 Feb 2023 08:40) (103/72 - 142/97)  BP(mean): --  RR: 16 (23 Feb 2023 08:40) (16 - 20)  SpO2: 99% (23 Feb 2023 08:40) (98% - 100%)    Parameters below as of 23 Feb 2023 08:40  Patient On (Oxygen Delivery Method): room air      I&O's Detail      Physical Exam:  GENERAL: Lying in bed, well appearing, speaking in full sentence, in NAD  HEART: S1S2 RRR  PULMONARY: CTABL, normal respiratory effort  ABDOMEN: + Bowel sounds present, soft  EXTREMITIES:  Warm, well -perfused, no pedal edema, distal pulses present  NEUROLOGICAL:AOx3     TELEMETERIC: SR HR 60-80                            14.7   5.84  )-----------( 238      ( 22 Feb 2023 18:57 )             47.3     PT/INR - ( 22 Feb 2023 18:57 )   PT: 13.2 sec;   INR: 1.14 ratio         PTT - ( 22 Feb 2023 18:57 )  PTT:29.5 sec  02-23    138  |  106  |  19  ----------------------------<  103<H>  4.4   |  23  |  1.53<H>    Ca    9.3      23 Feb 2023 05:35  Phos  3.2     02-23  Mg     2.10     02-23    TPro  8.3  /  Alb  4.4  /  TBili  0.7  /  DBili  x   /  AST  25  /  ALT  17  /  AlkPhos  98  02-22      < from: Xray Chest 1 View- PORTABLE-Urgent (Xray Chest 1 View- PORTABLE-Urgent .) (02.22.23 @ 18:43) >    IMPRESSION:  No focal consolidations.    < from: MR Cardiac w/wo IV Cont (11.12.20 @ 14:20) >  IMPRESSION:  Dilated LV with diffuse hypokinesis. Left ventricular ejection fraction measures 22%. Linear, mid-myocardial enhancement noted along the septum and anterior wall. Findings are consistent with dilated cardiomyopathy.    < from: Transthoracic Echocardiogram (06.29.21 @ 11:20) >  Dimensions:    Normal Values:  LA:     3.4    2.0 - 4.0 cm  Ao:     2.9    2.0- 3.8 cm  SEPTUM: 0.9    0.6 - 1.2 cm  PWT:    1.0    0.6 - 1.1 cm  LVIDd:  5.4    3.0 - 5.6 cm  LVIDs:  4.6    1.8 - 4.0 cm  Derived variables:  LVMI: 107 g/m2  RWT: 0.37  Fractional short: 15 %  EF (Bates Rule): 37 %  ------------------------------------------------------------------------  Observations:  Mitral Valve: Tethered mitral valve leaflets with normal  opening. Mild-moderate mitral regurgitation.  Aortic Valve/Aorta: Calcified trileaflet aortic valve with  normal opening. Mild aortic regurgitation.  Aortic Root: 2.9 cm.  Left Atrium: Mildly dilated left atrium.  LA volume index =  36 cc/m2.  Left Ventricle: Moderate global  left ventricular systolic  dysfunction. Normal left ventricular internal dimensions  and wall thicknesses.  Right Heart: Normal right atrium. Normal right ventricular  size and systolic function. Normal tricuspid valve. Mild  tricuspid regurgitation. Normal pulmonic valve.  Pericardium/Pleura: Normal pericardium with no pericardial  effusion.  Hemodynamic: Estimated right ventricular systolic pressure  equals 19 mm Hg, assuming right atrial pressure equals 3 mm  Hg, consistent with normal pulmonary pressures.  ------------------------------------------------------------------------  Conclusions:  1. Tethered mitral valve leaflets with normal opening.  Mild-moderate mitral regurgitation.  2. Calcified trileaflet aortic valve with normal opening.  Mild aortic regurgitation.  3. Mildly dilated left atrium.  LA volume index = 36 cc/m2.  4. Moderate global  left ventricular systolic dysfunction.  5. Normal right ventricular size and systolic function.  *** No previous Echo exam.  ------------------------------------------------------------------------  Confirmed on  7/1/2021 - 14:14:12 by JEANCARLOS Chew  ------------------------------------------------------------------------    < end of copied text >

## 2023-02-23 NOTE — CONSULT NOTE ADULT - SUBJECTIVE AND OBJECTIVE BOX
[facility]   MD ROSMERY FLOERZ MD KRISTINE SOLTANPOUR, DO ANGELA WONG, PA        TEL:  OFFICE: [phone]  From 5pm-7am answering service [phone]    --- INITIAL RENAL CONSULT NOTE ---date of service 02-23-23 @ 15:44    HPI:  73yo man with MHx of NICM, severe sys HF (LVEF 35-40% with GDMT from 25-30% ), severe functional MR, HTN, and CKD III (baseline Cr 1.6),  seizure/cardiac arrest, prolonged hospitalization at [facility] 12/19/2022 was d/c to rehab on 1/6/2023; course was c/b a coma (per Allscripts review) Per the patient, he was at [facility] since 1/6/2023 and was discharged home on 2/20/2023. He was told by the rehab to make an appointment with his HF doctor. Dr. Charles. Patient had an appointment with him today and was recommended to come to [facility] for an ICD evaluation. He is able to walk 1 block due to BARROW and can walk up 2-3 flights of stairs with his cane before getting SOB. Sleeps with 2 pillows at baseline;  States that no longer on Entresto - stopped at [facility]. Reports no fever, chills, diaphoresis, HA, lightheadedness, dizziness, changes in visions, CP, palpitation, abdominal pain, n/v/d, hematuria, melena, hematochezia      Allergies:  No Known Allergies      PAST MEDICAL & SURGICAL HISTORY:  HTN (hypertension)      Chronic kidney disease (CKD)      No significant past surgical history          Home Medications Reviewed    Hospital Medications:   MEDICATIONS  (STANDING):  aspirin enteric coated 81 milliGRAM(s) Oral daily  atorvastatin 40 milliGRAM(s) Oral at bedtime  carvedilol 25 milliGRAM(s) Oral every 12 hours  furosemide    Tablet 40 milliGRAM(s) Oral daily  hydrALAZINE 25 milliGRAM(s) Oral every 8 hours  levETIRAcetam 500 milliGRAM(s) Oral two times a day      SOCIAL HISTORY:  Denies ETOh, Smoking,     FAMILY HISTORY:  No pertinent family history in first degree relatives        REVIEW OF SYSTEMS:  CONSTITUTIONAL: No weakness, fevers or chills  EYES/ENT: No visual changes;  No vertigo or throat pain   NECK: No pain or stiffness  RESPIRATORY: No cough, wheezing, hemoptysis; No shortness of breath  CARDIOVASCULAR: see hpi  GASTROINTESTINAL: No abdominal or epigastric pain. No nausea, vomiting, or hematemesis; No diarrhea or constipation. No melena or hematochezia.  GENITOURINARY: No dysuria, frequency, foamy urine, urinary urgency, incontinence or hematuria  NEUROLOGICAL: No numbness or weakness  SKIN: No itching, burning, rashes, or lesions   VASCULAR: No bilateral lower extremity edema.   All other review of systems is negative unless indicated above.    VITALS:  T(F): 97.6 (02-23-23 @ 06:56), Max: 98.1 (02-22-23 @ 18:09)  HR: 78 (02-23-23 @ 13:53)  BP: 110/73 (02-23-23 @ 13:53)  RR: 18 (02-23-23 @ 13:53)  SpO2: 100% (02-23-23 @ 13:53)  Wt(kg): --    Height (cm): 165.1 (02-23 @ 00:10)  Weight (kg): 74.843 (02-22 @ 23:48)  BMI (kg/m2): 27.5 (02-23 @ 00:10)  BSA (m2): 1.82 (02-23 @ 00:10)    PHYSICAL EXAM:  General: NAD  HEENT: anicteric sclera, oropharynx clear, MMM  Neck: No JVD  Respiratory: CTAB, no wheezes, rales or rhonchi  Cardiovascular: S1, S2, RRR  Gastrointestinal: BS+, soft, NT/ND  Extremities: No cyanosis or clubbing. No peripheral edema  Neurological: A/O x 3, no focal deficits  Psychiatric: Normal mood, normal affect  : No CVA tenderness. No granados.   Skin: No rashes      LABS:  02-23    138  |  106  |  19  ----------------------------<  103<H>  4.4   |  23  |  1.53<H>    Ca    9.3      23 Feb 2023 05:35  Phos  3.2     02-23  Mg     2.10     02-23    TPro  8.3  /  Alb  4.4  /  TBili  0.7  /  DBili      /  AST  25  /  ALT  17  /  AlkPhos  98  02-22    Creatinine Trend: 1.53 <--, 1.53 <--                        14.7   5.84  )-----------( 238      ( 22 Feb 2023 18:57 )             47.3     Urine Studies:        RADIOLOGY & ADDITIONAL STUDIES:                 [facility]   MD ROSMERY FLOREZ MD KRISTINE SOLTANPOUR, DO ANGELA WONG, PA        TEL:  OFFICE: [phone]  From 5pm-7am answering service [phone]    --- INITIAL RENAL CONSULT NOTE ---date of service 02-23-23 @ 15:44    HPI:  73yo man with MHx of NICM, severe sys HF (LVEF 35-40% with GDMT from 25-30% ), severe functional MR, HTN, and CKD III (baseline Cr 1.6),  seizure/cardiac arrest, prolonged hospitalization at [facility] 12/19/2022 was d/c to rehab on 1/6/2023; course was c/b a coma (per Allscripts review) Per the patient, he was at [facility] since 1/6/2023 and was discharged home on 2/20/2023. He was told by the rehab to make an appointment with his HF doctor. Dr. Charles. Patient had an appointment with him today and was recommended to come to [facility] for an ICD evaluation. He is able to walk 1 block due to BARROW and can walk up 2-3 flights of stairs with his cane before getting SOB. Sleeps with 2 pillows at baseline;  States that no longer on Entresto - stopped at [facility]. Reports no fever, chills, diaphoresis, HA, lightheadedness, dizziness, changes in visions, CP, palpitation, abdominal pain, n/v/d, hematuria, melena, hematochezia. Nephrology consulted for elevated serum creatinine.       Allergies:  No Known Allergies      PAST MEDICAL & SURGICAL HISTORY:  HTN (hypertension)  Chronic kidney disease (CKD)      No significant past surgical history        Home Medications:  ATORVASTATIN 40MG TAB:  (23 Feb 2023 00:40)  CARVEDILOL 25MG TAB:  (23 Feb 2023 00:40)  CLOPIDOGREL 75MG TAB: 1 tab(s) orally once a day (23 Feb 2023 00:40)  HYDRALAZINE 25MG TAB: 1 tab(s) orally every 8 hours (23 Feb 2023 00:40)  Jardiance 10 mg oral tablet: 1 tab(s) orally once a day (in the morning) (23 Feb 2023 00:40)  LEVETIRACETA 500MG TAB: 1 tab(s) orally 2 times a day (23 Feb 2023 00:40)  METOPROL SUC 25MG ER TAB: 1 tab(s) orally once a day (23 Feb 2023 00:40)    Home Medications Reviewed    Hospital Medications:   MEDICATIONS  (STANDING):  aspirin enteric coated 81 milliGRAM(s) Oral daily  atorvastatin 40 milliGRAM(s) Oral at bedtime  carvedilol 25 milliGRAM(s) Oral every 12 hours  furosemide    Tablet 40 milliGRAM(s) Oral daily  hydrALAZINE 25 milliGRAM(s) Oral every 8 hours  levETIRAcetam 500 milliGRAM(s) Oral two times a day      SOCIAL HISTORY:  Denies ETOh, Smoking,     FAMILY HISTORY:  No pertinent family history in first degree relatives        REVIEW OF SYSTEMS:  CONSTITUTIONAL: No weakness, fevers or chills  EYES/ENT: No visual changes;  No vertigo or throat pain   NECK: No pain or stiffness  RESPIRATORY: No cough, wheezing, hemoptysis; No shortness of breath  CARDIOVASCULAR: see hpi  GASTROINTESTINAL: No abdominal or epigastric pain. No nausea, vomiting, or hematemesis; No diarrhea or constipation. No melena or hematochezia.  GENITOURINARY: No dysuria, frequency, foamy urine, urinary urgency, incontinence or hematuria  NEUROLOGICAL: No numbness or weakness  SKIN: No itching, burning, rashes, or lesions   VASCULAR: No bilateral lower extremity edema.   All other review of systems is negative unless indicated above.    VITALS:  T(F): 97.6 (02-23-23 @ 06:56), Max: 98.1 (02-22-23 @ 18:09)  HR: 78 (02-23-23 @ 13:53)  BP: 110/73 (02-23-23 @ 13:53)  RR: 18 (02-23-23 @ 13:53)  SpO2: 100% (02-23-23 @ 13:53)  Wt(kg): --    Height (cm): 165.1 (02-23 @ 00:10)  Weight (kg): 74.843 (02-22 @ 23:48)  BMI (kg/m2): 27.5 (02-23 @ 00:10)  BSA (m2): 1.82 (02-23 @ 00:10)    PHYSICAL EXAM:  General: NAD  HEENT: anicteric sclera, oropharynx clear, MMM  Neck: No JVD  Respiratory: CTAB, no wheezes, rales or rhonchi  Cardiovascular: S1, S2, RRR  Gastrointestinal: BS+, soft, NT/ND  Extremities: No cyanosis or clubbing. No peripheral edema  Neurological: A/O x 3, no focal deficits  Psychiatric: Normal mood, normal affect  : No CVA tenderness. No granados.   Skin: No rashes      LABS:  02-23    138  |  106  |  19  ----------------------------<  103<H>  4.4   |  23  |  1.53<H>    Ca    9.3      23 Feb 2023 05:35  Phos  3.2     02-23  Mg     2.10     02-23    TPro  8.3  /  Alb  4.4  /  TBili  0.7  /  DBili      /  AST  25  /  ALT  17  /  AlkPhos  98  02-22    Creatinine Trend: 1.53 <--, 1.53 <--                        14.7   5.84  )-----------( 238      ( 22 Feb 2023 18:57 )             47.3     Urine Studies:        RADIOLOGY & ADDITIONAL STUDIES:  Patient name: JACQUES, SAINTONGE  YOB: 1950   Age: 72 (M)   MR#: [id number]  Study Date: 2/23/2023  [facility]  Study quality: Technically Difficult  Referring Physician: Naresh Taylor MD  Blood Pressure: 103/72 mmHg  Height: 165 cm  Weight: 74 kg  BSA: 1.8 m2  ------------------------------------------------------------------------  PROCEDURE: Transthoracic echocardiogram with 2-D, M-Mode  and complete spectral and color flow Doppler.  INDICATION: Abnormal electrocardiogram (ECG) (EKG) (R94.31)  ------------------------------------------------------------------------  DIMENSIONS:  Dimensions:     Normal Values:  LA:     3.4 cm    2.0 - 4.0 cm  Ao:     3.4 cm    2.0 - 3.8 cm  SEPTUM: 0.8 cm    0.6 - 1.2 cm  PWT:    0.8 cm    0.6 - 1.1 cm  LVIDd:  4.6 cm    3.0 - 5.6 cm  LVIDs:  4.0 cm    1.8 - 4.0 cm  Derived Variables:  LVMI: 65 g/m2  RWT: 0.34  Fractional short: 13 %  Ejection Fraction (Modified Bates Rule): 32 %  ------------------------------------------------------------------------  OBSERVATIONS:  Mitral Valve: Mitral annular calcification, otherwise  normal mitral valve. Mild mitral regurgitation.  Aortic Root: Normal aortic root.  Aortic Valve: Aortic valve leaflet morphology not well  visualized. Mild aortic regurgitation.  Left Atrium: Normal left atrium.  LA volume index = 24  cc/m2.  Left Ventricle: Moderate global left ventricular systolic  dysfunction. Normal left ventricular internal dimensions  and wall thicknesses. Mild diastolic dysfunction (Stage I).  Right Heart: Normal right atrium. Normal right ventricular  size and function. Normal tricuspid valve. Minimal  tricuspid regurgitation. Normal pulmonic valve.  Pericardium/PleuraNormal pericardium with no pericardial  effusion.  ------------------------------------------------------------------------  CONCLUSIONS:  1. Mitral annular calcification, otherwise normal mitral  valve. Mild mitral regurgitation.  2. Aortic valve leaflet morphology not well visualized.  Mild aortic regurgitation.  3. Normal left ventricular internal dimensions and wall  thicknesses.  4. Moderate global left ventricular systolic dysfunction.  5. Mild diastolic dysfunction (Stage I).  6. Normal right ventricular size and function.  ------------------------------------------------------------------------  Confirmed on  2/23/2023 - 15:31:07 by Nilton Rust M.D.,  [facility], [facility]

## 2023-02-23 NOTE — CONSULT NOTE ADULT - SUBJECTIVE AND OBJECTIVE BOX
Patient is a 72y old  Male who presents with a chief complaint of Increased SOB, referred for ICD evaluation (23 Feb 2023 09:20)      HPI:  72 year old Male with PMH of HTN, and CKD III (baseline Cr 1.6), seizure/cardiac arrest with recent prolonged hospitalization at [facility] c/b coma, and recent c/o SOB/BARROW. patient presented for ICD evaluation.        PAST MEDICAL & SURGICAL HISTORY:  HTN (hypertension)      Chronic kidney disease (CKD)      No significant past surgical history          FAMILY HISTORY:  No pertinent family history in first degree relatives        Home Medications:  ATORVASTATIN 40MG TAB:  (23 Feb 2023 00:40)  CARVEDILOL 25MG TAB:  (23 Feb 2023 00:40)  CLOPIDOGREL 75MG TAB: 1 tab(s) orally once a day (23 Feb 2023 00:40)  HYDRALAZINE 25MG TAB: 1 tab(s) orally every 8 hours (23 Feb 2023 00:40)  Jardiance 10 mg oral tablet: 1 tab(s) orally once a day (in the morning) (23 Feb 2023 00:40)  LEVETIRACETA 500MG TAB: 1 tab(s) orally 2 times a day (23 Feb 2023 00:40)  METOPROL SUC 25MG ER TAB: 1 tab(s) orally once a day (23 Feb 2023 00:40)      Medications:  acetaminophen     Tablet .. 650 milliGRAM(s) Oral every 6 hours PRN  aspirin enteric coated 81 milliGRAM(s) Oral daily  atorvastatin 40 milliGRAM(s) Oral at bedtime  carvedilol 25 milliGRAM(s) Oral every 12 hours  furosemide    Tablet 40 milliGRAM(s) Oral daily  hydrALAZINE 25 milliGRAM(s) Oral every 8 hours  levETIRAcetam 500 milliGRAM(s) Oral two times a day  melatonin 3 milliGRAM(s) Oral at bedtime PRN      Review of systems:  10 point review of systems completed and are negative unless noted in HPI    Vitals:  T(C): 36.4 (02-23-23 @ 06:56), Max: 36.7 (02-22-23 @ 14:39)  HR: 64 (02-23-23 @ 08:40) (64 - 90)  BP: 103/72 (02-23-23 @ 08:40) (103/72 - 142/97)  BP(mean): --  RR: 16 (02-23-23 @ 08:40) (16 - 20)  SpO2: 99% (02-23-23 @ 08:40) (98% - 100%)    Daily     Daily     Weight (kg): 74.843 (02-22 @ 23:48)    I&O's Summary      Physical Exam:  Appearance: No Acute Distress  Neck: No JVD  Cardiovascular: Normal S1 S2  Respiratory: Clear to auscultation bilaterally  Gastrointestinal: Soft, Non-tender	  Skin: No cyanosis	  Neurologic: Non-focal  Extremities: No LE edema  Psychiatry: A & O x 3, Mood & affect appropriate    Labs:                        14.7   5.84  )-----------( 238      ( 22 Feb 2023 18:57 )             47.3     02-23    138  |  106  |  19  ----------------------------<  103<H>  4.4   |  23  |  1.53<H>    Ca    9.3      23 Feb 2023 05:35  Phos  3.2     02-23  Mg     2.10     02-23    TPro  8.3  /  Alb  4.4  /  TBili  0.7  /  DBili  x   /  AST  25  /  ALT  17  /  AlkPhos  98  02-22    PT/INR - ( 22 Feb 2023 18:57 )   PT: 13.2 sec;   INR: 1.14 ratio         PTT - ( 22 Feb 2023 18:57 )  PTT:29.5 sec      Serum Pro-Brain Natriuretic Peptide: 229:  (02.22.23 @ 18:57)    Blood Gas Venous - Lactate: 2.1: Elevated lactate. Consider ordering follow-up lactate to trend. mmol/L (02.22.23 @ 18:57)    Troponin T, High Sensitivity Result: 16:  (02.22.23 @ 20:30)              TELEMETRY: Sinus Rhythm     Echocardiogram:  Transthoracic Echocardiogram (06.29.21 @ 11:20)     LA:     3.4    2.0 - 4.0 cm  Ao:     2.9    2.0- 3.8 cm  SEPTUM: 0.9    0.6 - 1.2 cm  PWT:    1.0    0.6 - 1.1 cm  LVIDd:  5.4    3.0 - 5.6 cm  LVIDs:  4.6    1.8 - 4.0 cm  Derived variables:  LVMI: 107 g/m2  RWT: 0.37  Fractional short: 15 %  EF (Bates Rule): 37 %  ------------------------------------------------------------------------  Observations:  Mitral Valve: Tethered mitral valve leaflets with normal  opening. Mild-moderate mitral regurgitation.  Aortic Valve/Aorta: Calcified trileaflet aortic valve with  normal opening. Mild aortic regurgitation.  Aortic Root: 2.9 cm.  Left Atrium: Mildly dilated left atrium.  LA volume index =  36 cc/m2.  Left Ventricle: Moderate global  left ventricular systolic  dysfunction. Normal left ventricular internal dimensions  and wall thicknesses.  Right Heart: Normal right atrium. Normal right ventricular  size and systolic function. Normal tricuspid valve. Mild  tricuspid regurgitation. Normal pulmonic valve.  Pericardium/Pleura: Normal pericardium with no pericardial  effusion.  Hemodynamic: Estimated right ventricular systolic pressure  equals 19 mm Hg, assuming right atrial pressure equals 3 mm  Hg, consistent with normal pulmonary pressures.  ------------------------------------------------------------------------  Conclusions:  1. Tethered mitral valve leaflets with normal opening.  Mild-moderate mitral regurgitation.  2. Calcified trileaflet aortic valve with normal opening.  Mild aortic regurgitation.  3. Mildly dilated left atrium.  LA volume index = 36 cc/m2.  4. Moderate global  left ventricular systolic dysfunction.  5. Normal right ventricular size and systolic function.  *** No previous Echo exam.        EKG: < from: 12 Lead ECG (02.22.23 @ 14:52)     Ventricular Rate 77 BPM    Atrial Rate 77 BPM    P-R Interval 174 ms    QRS Duration 106 ms    Q-T Interval 404 ms    QTC Calculation(Bazett) 457 ms    P Axis 71 degrees    R Axis 15 degrees    T Axis 168 degrees    Diagnosis Line Normal sinus rhythm  ST & T wave abnormality, consider inferolateral ischemia  Abnormal ECG        Xray Chest 1 View- PORTABLE-Urgent (Xray Chest 1 View- PORTABLE-Urgent .) (02.22.23 @ 18:43)     FINDINGS:    The heart is normal in size.  No focal consolidations.  There is no pneumothorax or pleural effusion.  No acute bony abnormalities. Suture anchor overlies the left humeral head.    IMPRESSION:    No focal consolidations.         Patient is a 72y old  Male who presents with a chief complaint of Increased SOB, referred for ICD evaluation (23 Feb 2023 09:20)      HPI:  72 year old Male with PMH of HTN, and CKD III (baseline Cr 1.6), seizure/cardiac arrest with recent prolonged hospitalization at [facility] c/b coma, and recent c/o SOB/BARROW. Patient presented from HF office for ICD evaluation.        PAST MEDICAL & SURGICAL HISTORY:  HTN (hypertension)      Chronic kidney disease (CKD)      No significant past surgical history          FAMILY HISTORY:  No pertinent family history in first degree relatives        Home Medications:  ATORVASTATIN 40MG TAB:  (23 Feb 2023 00:40)  CARVEDILOL 25MG TAB:  (23 Feb 2023 00:40)  CLOPIDOGREL 75MG TAB: 1 tab(s) orally once a day (23 Feb 2023 00:40)  HYDRALAZINE 25MG TAB: 1 tab(s) orally every 8 hours (23 Feb 2023 00:40)  Jardiance 10 mg oral tablet: 1 tab(s) orally once a day (in the morning) (23 Feb 2023 00:40)  LEVETIRACETA 500MG TAB: 1 tab(s) orally 2 times a day (23 Feb 2023 00:40)  METOPROL SUC 25MG ER TAB: 1 tab(s) orally once a day (23 Feb 2023 00:40)      Medications:  acetaminophen     Tablet .. 650 milliGRAM(s) Oral every 6 hours PRN  aspirin enteric coated 81 milliGRAM(s) Oral daily  atorvastatin 40 milliGRAM(s) Oral at bedtime  carvedilol 25 milliGRAM(s) Oral every 12 hours  furosemide    Tablet 40 milliGRAM(s) Oral daily  hydrALAZINE 25 milliGRAM(s) Oral every 8 hours  levETIRAcetam 500 milliGRAM(s) Oral two times a day  melatonin 3 milliGRAM(s) Oral at bedtime PRN      Review of systems:  10 point review of systems completed and are negative unless noted in HPI    Vitals:  T(C): 36.4 (02-23-23 @ 06:56), Max: 36.7 (02-22-23 @ 14:39)  HR: 64 (02-23-23 @ 08:40) (64 - 90)  BP: 103/72 (02-23-23 @ 08:40) (103/72 - 142/97)  BP(mean): --  RR: 16 (02-23-23 @ 08:40) (16 - 20)  SpO2: 99% (02-23-23 @ 08:40) (98% - 100%)    Daily     Daily     Weight (kg): 74.843 (02-22 @ 23:48)    I&O's Summary      Physical Exam:  Appearance: No Acute Distress  Neck: No JVD  Cardiovascular: Normal S1 S2  Respiratory: Clear to auscultation bilaterally  Gastrointestinal: Soft, Non-tender	  Skin: No cyanosis	  Neurologic: Non-focal  Extremities: No LE edema  Psychiatry: A & O x 3, Mood & affect appropriate    Labs:                        14.7   5.84  )-----------( 238      ( 22 Feb 2023 18:57 )             47.3     02-23    138  |  106  |  19  ----------------------------<  103<H>  4.4   |  23  |  1.53<H>    Ca    9.3      23 Feb 2023 05:35  Phos  3.2     02-23  Mg     2.10     02-23    TPro  8.3  /  Alb  4.4  /  TBili  0.7  /  DBili  x   /  AST  25  /  ALT  17  /  AlkPhos  98  02-22    PT/INR - ( 22 Feb 2023 18:57 )   PT: 13.2 sec;   INR: 1.14 ratio         PTT - ( 22 Feb 2023 18:57 )  PTT:29.5 sec      Serum Pro-Brain Natriuretic Peptide: 229:  (02.22.23 @ 18:57)    Blood Gas Venous - Lactate: 2.1: Elevated lactate. Consider ordering follow-up lactate to trend. mmol/L (02.22.23 @ 18:57)    Troponin T, High Sensitivity Result: 16:  (02.22.23 @ 20:30)              TELEMETRY: Sinus Rhythm     Echocardiogram:  Transthoracic Echocardiogram (06.29.21 @ 11:20)     LA:     3.4    2.0 - 4.0 cm  Ao:     2.9    2.0- 3.8 cm  SEPTUM: 0.9    0.6 - 1.2 cm  PWT:    1.0    0.6 - 1.1 cm  LVIDd:  5.4    3.0 - 5.6 cm  LVIDs:  4.6    1.8 - 4.0 cm  Derived variables:  LVMI: 107 g/m2  RWT: 0.37  Fractional short: 15 %  EF (Bates Rule): 37 %  ------------------------------------------------------------------------  Observations:  Mitral Valve: Tethered mitral valve leaflets with normal  opening. Mild-moderate mitral regurgitation.  Aortic Valve/Aorta: Calcified trileaflet aortic valve with  normal opening. Mild aortic regurgitation.  Aortic Root: 2.9 cm.  Left Atrium: Mildly dilated left atrium.  LA volume index =  36 cc/m2.  Left Ventricle: Moderate global  left ventricular systolic  dysfunction. Normal left ventricular internal dimensions  and wall thicknesses.  Right Heart: Normal right atrium. Normal right ventricular  size and systolic function. Normal tricuspid valve. Mild  tricuspid regurgitation. Normal pulmonic valve.  Pericardium/Pleura: Normal pericardium with no pericardial  effusion.  Hemodynamic: Estimated right ventricular systolic pressure  equals 19 mm Hg, assuming right atrial pressure equals 3 mm  Hg, consistent with normal pulmonary pressures.  ------------------------------------------------------------------------  Conclusions:  1. Tethered mitral valve leaflets with normal opening.  Mild-moderate mitral regurgitation.  2. Calcified trileaflet aortic valve with normal opening.  Mild aortic regurgitation.  3. Mildly dilated left atrium.  LA volume index = 36 cc/m2.  4. Moderate global  left ventricular systolic dysfunction.  5. Normal right ventricular size and systolic function.  *** No previous Echo exam.        EKG: < from: 12 Lead ECG (02.22.23 @ 14:52)     Ventricular Rate 77 BPM    Atrial Rate 77 BPM    P-R Interval 174 ms    QRS Duration 106 ms    Q-T Interval 404 ms    QTC Calculation(Bazett) 457 ms    P Axis 71 degrees    R Axis 15 degrees    T Axis 168 degrees    Diagnosis Line Normal sinus rhythm  ST & T wave abnormality, consider inferolateral ischemia  Abnormal ECG        Xray Chest 1 View- PORTABLE-Urgent (Xray Chest 1 View- PORTABLE-Urgent .) (02.22.23 @ 18:43)     FINDINGS:    The heart is normal in size.  No focal consolidations.  There is no pneumothorax or pleural effusion.  No acute bony abnormalities. Suture anchor overlies the left humeral head.    IMPRESSION:    No focal consolidations.

## 2023-02-23 NOTE — CONSULT NOTE ADULT - ASSESSMENT
71yo man with MHx of NICM, severe sys HF (LVEF 35-40% with GDMT from 25-30% ), severe functional MR, HTN, and CKD III (baseline Cr 1.6),  seizure/cardiac arrest, prolonged hospitalization at [facility] 12/19/2022 was d/c to rehab on 1/6/2023; course was c/b a coma (per Allscripts review) Per the patient, he was at [facility] since 1/6/2023 and was discharged home on 2/20/2023. He was told by the rehab to make an appointment with his HF doctor. Dr. Charles. Patient had an appointment with him today and was recommended to come to [facility] for an ICD evaluation    CKD [id number]  baseline ~ 1.5-1.6  follows up with Dr. Ye  renal function stable  monitor  avoid nephrotoxic agents    HF  f/u team    htn  controlled  monitor 73yo man with MHx of NICM, severe sys HF (LVEF 35-40% with GDMT from 25-30% ), severe functional MR, HTN, and CKD III (baseline Cr 1.6),  seizure/cardiac arrest, prolonged hospitalization at [facility] 12/19/2022 was d/c to rehab on 1/6/2023; course was c/b a coma (per Allscripts review) Per the patient, he was at [facility] since 1/6/2023 and was discharged home on 2/20/2023. He was told by the rehab to make an appointment with his HF doctor. Dr. Charles. Patient had an appointment with him today and was recommended to come to [facility] for an ICD evaluation    CKD [id number]  baseline ~ 1.5-1.6  follows up with Dr. Ye  renal function stable  monitor  avoid nephrotoxic agents    HFrEF  EF of 32% on Echo 2/23/22  f/u team    HTN  controlled  monitor

## 2023-02-23 NOTE — DISCHARGE NOTE PROVIDER - HOSPITAL COURSE
Admission HPI: 73yo man with MHx of NICM, severe sys HF (LVEF 35-40% with GDMT from 25-30% ), severe functional MR, HTN, and CKD III (baseline Cr 1.6),  seizure/cardiac arrest, prolonged hospitalization at [facility] 12/19/2022 was d/c to rehab on 1/6/2023; course was c/b a coma (per Allscripts review) Per the patient, he was at [facility] [place] since 1/6/2023 and was discharged home on 2/20/2023. He was told by the rehab to make an appointment with his HF doctor. Dr. Charles. Patient had an appointment with him today and was recommended to come to [facility] for an ICD evaluation. He is able to walk 1 block due to BARROW and can walk up 2-3 flights of stairs with his cane before getting SOB. Sleeps with 2 pillows at baseline;  States that no longer on Entresto - stopped at [facility]. Reports no fever, chills, diaphoresis, HA, lightheadedness, dizziness, changes in visions, CP, palpitation, abdominal pain, n/v/d, hematuria, melena, hematochezia  Of note, patient was seen by Dr. Duncan in 9/21/2020 and was recommended ICD for primary prevention of sudden cardiac death given his severe LV dysfunction despite being on GDMT for more than 3 months.    ED course: EP was consulted for ICD evaluation.     Hospital course: Admission HPI: 73yo man with MHx of NICM, severe sys HF (LVEF 35-40% with GDMT from 25-30% ), severe functional MR, HTN, and CKD III (baseline Cr 1.6),  seizure/cardiac arrest, prolonged hospitalization at [facility] 12/19/2022 was d/c to rehab on 1/6/2023; course was c/b a coma (per Allscripts review) Per the patient, he was at [facility] since 1/6/2023 and was discharged home on 2/20/2023. He was told by the rehab to make an appointment with his HF doctor. Dr. Charles. Patient had an appointment with him today and was recommended to come to [facility] for an ICD evaluation. He is able to walk 1 block due to BARROW and can walk up 2-3 flights of stairs with his cane before getting SOB. Sleeps with 2 pillows at baseline;  States that no longer on Entresto - stopped at [facility]. Reports no fever, chills, diaphoresis, HA, lightheadedness, dizziness, changes in visions, CP, palpitation, abdominal pain, n/v/d, hematuria, melena, hematochezia  Of note, patient was seen by Dr. Duncan in 9/21/2020 and was recommended ICD for primary prevention of sudden cardiac death given his severe LV dysfunction despite being on GDMT for more than 3 months.    ED course: EP was consulted for ICD evaluation.     Hospital course:  Pt evaluated by heart failure team and EP.  ICD implanted on Admission HPI: 71yo man with MHx of NICM, severe sys HF (LVEF 35-40% with GDMT from 25-30% ), severe functional MR, HTN, and CKD III (baseline Cr 1.6),  seizure/cardiac arrest, prolonged hospitalization at [facility] 12/19/2022 was d/c to rehab on 1/6/2023; course was c/b a coma (per Allscripts review) Per the patient, he was at [facility] since 1/6/2023 and was discharged home on 2/20/2023. He was told by the rehab to make an appointment with his HF doctor. Dr. Charles. Patient had an appointment with him today and was recommended to come to [facility] for an ICD evaluation. He is able to walk 1 block due to BARROW and can walk up 2-3 flights of stairs with his cane before getting SOB. Sleeps with 2 pillows at baseline;  States that no longer on Entresto - stopped at [facility]. Reports no fever, chills, diaphoresis, HA, lightheadedness, dizziness, changes in visions, CP, palpitation, abdominal pain, n/v/d, hematuria, melena, hematochezia  Of note, patient was seen by Dr. Duncan in 9/21/2020 and was recommended ICD for primary prevention of sudden cardiac death given his severe LV dysfunction despite being on GDMT for more than 3 months.    ED course: EP was consulted for ICD evaluation.     Hospital course:  Pt evaluated by heart failure team and EP.  ICD implanted on 2/27      Problem/Plan - 1:  ·  Problem: Chest wall tenderness.   ·  Plan: complaining of chest wall tenderness at site of ICD placement  Will trail Oxycodone 5mg ONCE- if no improvement will do trop and EKG, however likely due to surgical site pain   If oxy 5mg helps- can give oxy 5mg Q8H prn x 3 days  monitor for signs of fever, swelling or drainage at surgical site.  Improved with oxycodone.      Problem/Plan - 2:  ·  Problem: BARROW (dyspnea on exertion).   ·  Plan: Multifactorial due chronic sys CHF and to recent hospitalization deconditioning (s/p rehab); Ambulates with a cane;  Pro-BNP not suggestive of acute fluid overload; Minimal trace LE edema;  -CXR: clear lungs   -ProBNP 229, Trop 18-->16, low suspicion of ACS  -TTE: LVEF 32%  -appreciate CHF team recs: Resume Lasix 40 mg po daily at home.   Continue Entresto 24/26 mg po BID- he was on this at home.   Continue Coreg 25 mg po BID.   Continue hydral 25 mg po TID.     Problem/Plan - 3:  ·  Problem: Chronic systolic congestive heart failure.   ·  Plan: Moderate global  left ventricular systolic dysfunction per TTE 2021  -s/p ICD placement on 2/27  -Telemetry  -Daily weight, strict I/Os, Fluid restriction 1.5L/day   -c/w Carvedilol BID, entresto, lasix   -c/w DAPT, Statin   -Jardiance for CHF continue on DC.      Problem/Plan - 4:  ·  Problem: Stage 3 chronic kidney disease.  ·  Plan: CKD [id number]  His baseline ~ 1.5-1.6 and follows up with Dr. Gutiérrez.   His renal function is stable  as per renal ; stable from renal for dc. pt advised to follow up with dr. gutiérrez in 1-2wks post hd  - monitor BMP.  - avoid nephrotoxic agents.     Problem/Plan - 5:  ·  Problem: Encounter for deep vein thrombosis (DVT) prophylaxis.   ·  Plan: SCDs for now.

## 2023-02-23 NOTE — CONSULT NOTE ADULT - NS ATTEND AMEND GEN_ALL_CORE FT
Scr is at baseline. Will continue to monitor. Pt has HFrEF defer diuretics to heart failure team.
71 year old man with pmhx of NICM, HF (LVEF 35-40% with GDMT from 25-30% ) with severe functional MR, uncontrolled HTN, and CKD III (baseline Cr 1.6), and seizure/cardiac arrest and was hospitalized at [facility] 2/19/2022 to 1/6/2023 and course c/b a coma for 3 days per the niece. Seen by HF today and sent to ED for further management.     #HFrEF  #Cardiac arrest  - Patient meets criteria for a secondary prevention ICD. Plan for on this admission once optimized from HF stand point.   - Discussed plan with Dr Charles from heart failure.
72 year old Male with h/o HTN,, CKD III (baseline Cr 1.6), HFrEF/NICM (EF 30-35%, LVEDD 6.6 cm), recent seizure/cardiac arrest with prolonged hospitalization at [facility] c/b coma (12/22) who was referred to ED for expedited evaluation with repeat TTE and consideration of ICD implant. Per niece, there was a loud noise and she went to tend to him. Performed CPR and when EMS came, he came to but when brought to hospital had another cardiac arrest requiring shock. Was debilitated and required rehab. Prior to rehab was considered too "weak" to undergoing ICD implant. Wasn't given Lifevest. Currently denies CP/SoB. Labs reivewed - Cr 1.5 (baseline), . Overall appears euvolemic but requires ICD for secondary prevention.  - resume Entresto at 24/26 twice/day  - c/w coreg 25 mg twice/day  - c/w hydralazine 25 mg three timesd/ay  - d/c lasix  - repeat TTE reviewed  - possible ICD implant tomorrow

## 2023-02-23 NOTE — CONSULT NOTE ADULT - ASSESSMENT
72 year old Male with PMH of HTN, and CKD III (baseline Cr 1.6), seizure/cardiac arrest with recent prolonged hospitalization at [facility] c/b coma, and recent c/o SOB/BARROW. patient presented for ICD evaluation.        Pertinent Labs: BNP  229   Lactate 2.1 repeat pending    Troponin 18/23/16     CXR; Clear lungs     EKG: Normal sinus rhythm ST & T wave abnormality, consider inferolateral ischemia      Cardiac Cath Lab - Adult (03.03.20 @ 14:10)     VENTRICLES: No left ventriculogram was performed.  CORONARY VESSELS: The coronary circulation is right dominant.  LM:   --  LM: Normal.  LAD:   --  LAD: Angiography showed minor luminal irregularities with no  flow limiting lesions.  CX:   --  Circumflex: Angiography showed minor luminal irregularities with  no flow limiting lesions.  --  OM1: Angiography showed minor luminal irregularities with no flow  limiting lesions.  RCA:   --  RCA: Angiography showed minor luminal irregularities with no  flow limiting lesions.  COMPLICATIONS: There were no complications.  DIAGNOSTIC IMPRESSIONS: There is mild irregularity of the coronary anatomy.  DIAGNOSTIC RECOMMENDATIONS: Medical therapy    Pressures:  -- Pulmonary Artery (S/D/M): 43/14/27  Pressures:  -- Pulmonary Capillary Wedge: 18/18/18  Pressures:  -- Pulmonary Capillary Wedge: 18/22/17  Pressures:  -- Right Atrium (a/v/M): 4/1/1  Outputs:  -- OUTPUTS: CO by Lissa: 3.84  Outputs:  -- OUTPUTS: Lissa cardiac index: 2.17  Outputs:  -- RESISTANCES: Systemic vascular resistance (dsc): 1959.22       72 year old Male with PMH of HTN, and CKD III (baseline Cr 1.6), seizure/cardiac arrest with recent prolonged hospitalization at [facility] c/b coma, and recent c/o SOB/BARROW. Patient presented from HF office  for ICD evaluation.        Pertinent Labs: BNP  229   Lactate 2.1 repeat pending    Troponin 18/23/16     CXR; Clear lungs     EKG: Normal sinus rhythm ST & T wave abnormality, consider inferolateral ischemia      Cardiac Cath Lab - Adult (03.03.20 @ 14:10)     VENTRICLES: No left ventriculogram was performed.  CORONARY VESSELS: The coronary circulation is right dominant.  LM:   --  LM: Normal.  LAD:   --  LAD: Angiography showed minor luminal irregularities with no  flow limiting lesions.  CX:   --  Circumflex: Angiography showed minor luminal irregularities with  no flow limiting lesions.  --  OM1: Angiography showed minor luminal irregularities with no flow  limiting lesions.  RCA:   --  RCA: Angiography showed minor luminal irregularities with no  flow limiting lesions.  COMPLICATIONS: There were no complications.  DIAGNOSTIC IMPRESSIONS: There is mild irregularity of the coronary anatomy.  DIAGNOSTIC RECOMMENDATIONS: Medical therapy    Pressures:  -- Pulmonary Artery (S/D/M): 43/14/27  Pressures:  -- Pulmonary Capillary Wedge: 18/18/18  Pressures:  -- Pulmonary Capillary Wedge: 18/22/17  Pressures:  -- Right Atrium (a/v/M): 4/1/1  Outputs:  -- OUTPUTS: CO by Lissa: 3.84  Outputs:  -- OUTPUTS: Lissa cardiac index: 2.17  Outputs:  -- RESISTANCES: Systemic vascular resistance (dsc): 1959.22       72 year old Male with PMH of HTN, and CKD III (baseline Cr 1.6), seizure/cardiac arrest with recent prolonged hospitalization at [facility] c/b coma, and recent c/o SOB/BARROW. Patient presented from HF office  for ICD evaluation.        Pertinent Labs: BNP  229   Lactate 2.1 repeat pending    Troponin 18/23/16     CXR; Clear lungs     EKG: Normal sinus rhythm ST & T wave abnormality, consider inferolateral ischemia      Cardiac Cath Lab - Adult (03.03.20 @ 14:10)     VENTRICLES: No left ventriculogram was performed.  CORONARY VESSELS: The coronary circulation is right dominant.  LM:   --  LM: Normal.  LAD:   --  LAD: Angiography showed minor luminal irregularities with no  flow limiting lesions.  CX:   --  Circumflex: Angiography showed minor luminal irregularities with  no flow limiting lesions.  --  OM1: Angiography showed minor luminal irregularities with no flow  limiting lesions.  RCA:   --  RCA: Angiography showed minor luminal irregularities with no  flow limiting lesions.  COMPLICATIONS: There were no complications.  DIAGNOSTIC IMPRESSIONS: There is mild irregularity of the coronary anatomy.  DIAGNOSTIC RECOMMENDATIONS: Medical therapy    Pressures:  -- Pulmonary Artery (S/D/M): 43/14/27  Pressures:  -- Pulmonary Capillary Wedge: 18/18/18  Pressures:  -- Pulmonary Capillary Wedge: 18/22/17  Pressures:  -- Right Atrium (a/v/M): 4/1/1  Outputs:  -- OUTPUTS: CO by Lissa: 3.84  Outputs:  -- OUTPUTS: Lissa cardiac index: 2.17  Outputs:  -- RESISTANCES: Systemic vascular resistance (dsc): 1959.22      Transthoracic Echocardiogram (02.23.23 @ 14:19)   DIMENSIONS:  Dimensions:     Normal Values:  LA:     3.4 cm    2.0 - 4.0 cm  Ao:     3.4 cm    2.0 - 3.8 cm  SEPTUM: 0.8 cm    0.6 - 1.2 cm  PWT:    0.8 cm    0.6 - 1.1 cm  LVIDd:  4.6 cm    3.0 - 5.6 cm  LVIDs:  4.0 cm    1.8 - 4.0 cm  Derived Variables:  LVMI: 65 g/m2  RWT: 0.34  Fractional short: 13 %  Ejection Fraction (Modified Bates Rule): 32 %  ------------------------------------------------------------------------  OBSERVATIONS:  Mitral Valve: Mitral annular calcification, otherwise  normal mitral valve. Mild mitral regurgitation.  Aortic Root: Normal aortic root.  Aortic Valve: Aortic valve leaflet morphology not well  visualized. Mild aortic regurgitation.  Left Atrium: Normal left atrium.  LA volume index = 24  cc/m2.  Left Ventricle: Moderate global left ventricular systolic  dysfunction. Normal left ventricular internal dimensions  and wall thicknesses. Mild diastolic dysfunction (Stage I).  Right Heart: Normal right atrium. Normal right ventricular  size and function. Normal tricuspid valve. Minimal  tricuspid regurgitation. Normal pulmonic valve.  Pericardium/PleuraNormal pericardiumwith no pericardial  effusion.  ------------------------------------------------------------------------  CONCLUSIONS:  1. Mitral annular calcification, otherwise normal mitral  valve. Mild mitral regurgitation.  2. Aortic valve leaflet morphology not well visualized.  Mild aortic regurgitation.  3. Normal left ventricular internal dimensions and wall  thicknesses.  4. Moderate global left ventricular systolic dysfunction.  5. Mild diastolic dysfunction (Stage I).  6. Normal right ventricular size and function.

## 2023-02-23 NOTE — PATIENT PROFILE ADULT - NSPRESCRALCSCORE_GEN_A_NUR_CAL
[facility]  Certificate of Child Health Examination  Student's Name:   Todd Bolden Birth Date  2018  Sex  male  Race/Ethnicity   School/Grade Level/ID#     Address:   [address]  [place] [place] [address]  Parent/Guardian             Telephone#                                            Home:                               Work:   IMMUNIZATIONS: To be completed by health care provider. The mo/da/yr for every dose administered is required. If a specific vaccine is medically contraindicated, a separate written statement must be attached by the health care responsible for completing the health examination explaining the medical reason for the contraindication.      Immunization History   Administered Date(s) Administered   • DTaP(INFANRIX) 01/24/2019   • DTaP/Hep B/IPV 2017, 2017, 2017   • Hep A, ped/adol, 2 dose 05/07/2018, 01/24/2019   • Hep B, adolescent or pediatric 2017   • Hib (PRP-T) 2017, 2017, 2017, 01/24/2019   • Influenza, injectable, quadrivalent, preservative-free 02/05/2018, 03/05/2018, 01/24/2019   • MMR 05/07/2018   • Pneumococcal Conjugate 13 valent 2017, 2017, 2017, 05/07/2018   • Rotavirus - pentavalent 2017, 2017, 2017   • Varicella 05/07/2018      Immunizations given 7/6/2021: None      Health care provider (MD, DO, APN, PA, school health professional, health official) verifying above immunization history must sign below. If adding dates to the above immunization history section, put your initials by date(s) and sign here.)  Signature                                                                 Title                                                Date  _____________________________________________________________________________________  Signature                                                                 Title                                                 Date  _____________________________________________________________________________________   ALTERNATIVE PROOF OF IMMUNITY   1. Clinical diagnosis (measles, mumps, hepatitis B) is allowed when verified by physician and supported with lab confirmation.  Attach copy of lab result.    * MEASLES ([place])  MO   DA  YR          **MUMPS  MO   DA  YR             HEPATITIS B  MO   DA   YR           VARICELLA  MO   DA  YR      2. History of varicella (chickenpox) disease is acceptable if verified by health care provider, school health professional or health official.  Person signing below verifies that the parent/guardian’s description of varicella disease history is indicative of past infection and is accepting such history as documentation of disease.  Date of Disease                                  Signature                                                           Title   3. Laboratory Evidence of Immunity (check one)      __ Measles*         __ Mumps**        __ Rubella        __Varicella    (Attach copy of lab result)         *All measles cases diagnosed on or after July 1, 2002, must be confirmed by laboratory evidence.      **All mumps cases diagnosed on or after July 1, 2013, must be confirmed by laboratory evidence.     Completion of Alternative 1 or 3 MUST be accompanied by Labs & Physician Signature: ___________________________  Physician Statements of Immunity MUST be submitted to [facility] for review.     Certificates of Denominational Exemption to Immunizations or Physician Medical Statements of Medical Contraindication Are Reviewed   and Maintained by the School Authority     (11/2015)                                         (COMPLETE BOTH SIDES)                                  Approved [facility] 3/2016      Student's Name:  Todd Bolden Birth Date 2018 Sex male School Grade Level/ID#     Page 2 of 2   HEALTH HISTORY      TO BE COMPLETED AND SIGNED BY PARENT/GUARDIAN AND VERIFIED BY HEALTH CARE  PROVIDER  ALLERGIES: (Food, drug, insect, other) Yes is allergic to adhesive   (environmental) and dairy digestive   (food or med).   MEDICATION: (List all prescribed or taken on a regular basis.)   Yes prednisone prn   Diagnosis of asthma?  Child wakes during night coughing? Yes    No  Yes    No  Loss of function of one of paired  organs?(eye/ear/kidney/testicle) Yes    No    Birth defects? Yes    No  Hospitalizations? Yes    No    Developmental delay? Yes    No   When? What for? Yes    No    Blood disorders? Hemophilia,  Sickle Cell, Other? Explain. Yes    No  Surgery? (List all.)  When? What for? Yes    No    Diabetes? Yes    No  Serious injury or illness? Yes    No    Head injury/Concussion/Passed out? Yes    No  TB skin test positive (past/present)? * Yes    No *If yes, refer to local [facility] dept   Seizures? What are they like?  Yes    No  TB disease (past or present)? * Yes    No *If yes, refer to local [facility] dept   Heart problem/Shortness of breath?  Yes    No  Tobacco use (type, frequency)?  Yes    No    Heart murmur/High blood pressure? Yes    No  Alcohol/Drug use?  Yes    No    Dizziness or chest pain with exercise? Yes    No  Family history of sudden death  before age 50? (Cause?) Yes    No    Eye/Vision problems? ______           __Glasses          __Contacts  Last exam by eye doctor ______  Other concerns? (crossed eye, drooping lids, squinting, difficulty reading) Dental?   __ Braces     __ Bridge     __ Plate   __Other   Ear/Hearing problems? Yes    No  Information may be shared with appropriate personnel for health and educational purposes.   Bone/Joint problem/injury/scoliosis? Yes    No  Parent/Guardian  Signature                                               Date   PHYSICAL EXAMINATION REQUIREMENTS   Entire section below to be completed by MD/DO/APN/PA Head Circumference if <2-3 years old - /70   Pulse 112   Temp 98.2 °F (36.8 °C) (Temporal)   Resp 22   Ht 3' 7.25\" (1.099 m)   Wt 18.8  kg (41 lb 6.4 oz)   BMI 15.56 kg/m²   BSA 0.75 m²    49 %ile (Z= -0.03) based on CDC (Boys, 2-20 Years) BMI-for-age based on BMI available as of 7/6/2021.   DIABETES SCREENING (NOT REQUIRED FOR ) BMI>85% age/sex No     And any two of the following: Family History: No  Ethnic Minority: No    Signs of Insulin Resistance (hypertension, dyslipidemia, polycystic ovarian syndrome, acanthosis nigricans): No  At Risk: No   LEAD RISK QUESTIONNAIRE Required for children age 6 months through 6 years enrolled in licensed or public school operated day care, , nursery school and/or . (Blood test required if resides in [place] or high risk zip code.)  Questionnaire Administered? Yes  Blood Test Indicated? No   Blood Test Date _____   Blood Test Result ______________   TB SKIN OR BLOOD TEST Recommended only for children in high-risk groups including children immunosuppressed due to HIV infection or other conditions, frequent travel to or born in high prevalence countries or those exposed to adults in high-risk categories. See CDC guidelines. http://www.cdc.gov/tb/publications/factsheets/testing/TB_testing.htm.  Test Needed: No     Test performed: No                          Skin Test:    Date Read              /      /             Result:   Positive__      Negative__        mm________                          Blood Test: Date Reported       /      /               Result:  Positive__      Negative__      Value________  LAB TESTS (recommended) Date/Result  Date/Results   Hemoglobin or Hematocrit NA Sickle Cell (when indicated) NA   Urinalysis NA Developmental Screening Tool NA        SYSTEM REVIEW Normal  Comments/Follow-up/Needs  Normal Comments/Follow-up/Needs   Skin  Yes  Endocrine Yes    Ears Yes                  Screening Result Gastrointestinal Yes    Eyes Yes                  Screening Result: Genito-Urinary Yes                                            LMP   Nose Yes  Neurologic Yes    Throat  Yes  Musculoskeletal Yes    Mouth/Dental Yes  Spinal Exam Yes    Cardiovascular/HTN Yes  Nutritional status Yes    Respiratory Yes Diagnosis of Asthma: No   Mental Health Yes    Currently Prescribed Asthma Medication:        No  Quick-relief medication (e.g. Short Acting Beta Antagonist)        No   Controller medication (e.g. inhaled corticosteroid) Other     NEEDS/MODIFICATIONS required in the school setting: No restrictions DIETARY Needs/Restrictions: see food allergies   SPECIAL INSTRUCTIONS/DEVICES e.g. safety glasses, glass eye, chest protector for arrhythmia, pacemaker, prosthetic device, dental bridge, false teeth, athletic support/cup: No restrictions   MENTAL HEALTH/OTHER Is there anything else the school should know about this student?  If you would like to discuss this student’s health with school or school health personnel:  Not needed   EMERGENCY ACTION needed while at school due to child’s health condition (e.g. ,seizures, asthma, insect sting, food, peanut allergy, bleeding problem, diabetes, heart problem)?   No   On the basis of the examination on this day, I approve this child’s participation in                                (If No or Modified please attach explanation.)  PHYSICAL EDUCATION:  Yes                               [employer]OR[employer]   Yes   Print Name  Elvira Hernandez MD         Signature                                                                       Date: 7/6/2021   Address:  [facility] [address] [facility] [address] [address] [place] [facility] [address] [phone]         (Complete Both Sides)     Approved [facility] 3/2016   1

## 2023-02-23 NOTE — DISCHARGE NOTE PROVIDER - ATTENDING DISCHARGE PHYSICAL EXAMINATION:
CONSTITUTIONAL: NAD, well-developed, well-groomed  EYES: PERRLA; conjunctiva and sclera clear  ENMT: Moist oral mucosa, no pharyngeal injection or exudates; normal dentition  NECK: Supple, no palpable masses; no thyromegaly  RESPIRATORY: Normal respiratory effort; lungs are clear to auscultation bilaterally  CARDIOVASCULAR: Mild discomfort to palpation in ICD site,  Regular rate and rhythm, normal S1 and S2, no murmur/rub/gallop; No lower extremity edema; Peripheral pulses are 2+ bilaterally  ABDOMEN: Nontender to palpation, normoactive bowel sounds, no rebound/guarding; No hepatosplenomegaly  MUSCULOSKELETAL:  Normal gait; no clubbing or cyanosis of digits; no joint swelling or tenderness to palpation  PSYCH: A+O to person, place, and time; affect appropriate  NEUROLOGY: CN 2-12 are intact and symmetric; no gross sensory deficits   SKIN: No rashes; no palpable lesions

## 2023-02-23 NOTE — DISCHARGE NOTE PROVIDER - NSDCFUSCHEDAPPT_GEN_ALL_CORE_FT
Rajesh Charles Physician [facility] [address]  Scheduled Appointment: 04/19/2023     [facility] [address]  Scheduled Appointment: 03/08/2023    Rajesh Charles  [facility]  HEARTFAIL [address]  Scheduled Appointment: 04/19/2023     [facility]  ELECTROPH [address]  Scheduled Appointment: 03/08/2023    [facility]  HEARTFAIL [address]  Scheduled Appointment: 03/15/2023    Rajesh Charles  [facility]  HEARTFAIL [address]  Scheduled Appointment: 04/19/2023

## 2023-02-23 NOTE — PATIENT PROFILE ADULT - FALL HARM RISK - HARM RISK INTERVENTIONS
Assistance with ambulation/Assistance OOB with selected safe patient handling equipment/Communicate Risk of Fall with Harm to all staff/Discuss with provider need for PT consult/Monitor gait and stability/Provide patient with walking aids - walker, cane, crutches/Reinforce activity limits and safety measures with patient and family/Tailored Fall Risk Interventions/Use of alarms - bed, chair and/or voice tab/Visual Cue: Yellow wristband and red socks/Bed in lowest position, wheels locked, appropriate side rails in place/Call bell, personal items and telephone in reach/Instruct patient to call for assistance before getting out of bed or chair/Non-slip footwear when patient is out of bed/[place] to call system/Physically safe environment - no spills, clutter or unnecessary equipment/Purposeful Proactive Rounding/Room/bathroom lighting operational, light cord in reach

## 2023-02-23 NOTE — PROGRESS NOTE ADULT - ASSESSMENT
71yo man with MHx of NICM, severe sys HF (LVEF 35-40% with GDMT from 25-30%), severe functional MR, HTN, and CKD III (baseline Cr 1.6),  seizure/cardiac arrest, recent prolonged hospitalization at [facility] c/b a coma a/w BARROW, being evaluated for ICD by EP

## 2023-02-23 NOTE — DISCHARGE NOTE PROVIDER - PROVIDER TOKENS
PROVIDER:[TOKEN:[[id number]]],PROVIDER:[TOKEN:[[id number]]],PROVIDER:[TOKEN:[[id number]]]

## 2023-02-23 NOTE — PROGRESS NOTE ADULT - SUBJECTIVE AND OBJECTIVE BOX
[facility] Division of Hospital Medicine  Markel Huber MD  Pager [phone]    Patient is a 72y old  Male who presents with a chief complaint of Increased SOB, referred for ICD evaluation (23 Feb 2023 15:43)      SUBJECTIVE / OVERNIGHT EVENTS: Pt comfortable      MEDICATIONS  (STANDING):  aspirin enteric coated 81 milliGRAM(s) Oral daily  atorvastatin 40 milliGRAM(s) Oral at bedtime  carvedilol 25 milliGRAM(s) Oral every 12 hours  furosemide    Tablet 40 milliGRAM(s) Oral daily  hydrALAZINE 25 milliGRAM(s) Oral every 8 hours  levETIRAcetam 500 milliGRAM(s) Oral two times a day    MEDICATIONS  (PRN):  acetaminophen     Tablet .. 650 milliGRAM(s) Oral every 6 hours PRN Temp greater or equal to 38C (100.4F), Mild Pain (1 - 3)  melatonin 3 milliGRAM(s) Oral at bedtime PRN Insomnia      CAPILLARY BLOOD GLUCOSE        I&O's Summary      PHYSICAL EXAM:  Vital Signs Last 24 Hrs  T(C): 36.4 (23 Feb 2023 06:56), Max: 36.7 (22 Feb 2023 18:09)  T(F): 97.6 (23 Feb 2023 06:56), Max: 98.1 (22 Feb 2023 18:09)  HR: 78 (23 Feb 2023 13:53) (64 - 90)  BP: 110/73 (23 Feb 2023 13:53) (103/72 - 142/97)  BP(mean): --  RR: 18 (23 Feb 2023 13:53) (16 - 20)  SpO2: 100% (23 Feb 2023 13:53) (99% - 100%)    Parameters below as of 23 Feb 2023 13:53  Patient On (Oxygen Delivery Method): room air      CONSTITUTIONAL: NAD  EYES: EOMI, conjunctiva and sclera clear  ENMT: Moist oral mucosa  NECK: Supple  RESPIRATORY: Breathing unlabored, CTAB  CARDIOVASCULAR: S1S2 no MRG  ABDOMEN: Nontender to palpation, normoactive bowel sounds, no rebound/guarding  MUSCULOSKELETAL: no clubbing or cyanosis of digits  NEUROLOGY: No focal deficits   SKIN: No rashes or lesions    LABS:                        14.7   5.84  )-----------( 238      ( 22 Feb 2023 18:57 )             47.3     02-23    138  |  106  |  19  ----------------------------<  103<H>  4.4   |  23  |  1.53<H>    Ca    9.3      23 Feb 2023 05:35  Phos  3.2     02-23  Mg     2.10     02-23    TPro  8.3  /  Alb  4.4  /  TBili  0.7  /  DBili  x   /  AST  25  /  ALT  17  /  AlkPhos  98  02-22    PT/INR - ( 22 Feb 2023 18:57 )   PT: 13.2 sec;   INR: 1.14 ratio         PTT - ( 22 Feb 2023 18:57 )  PTT:29.5 sec      CODE: FULL

## 2023-02-23 NOTE — DISCHARGE NOTE PROVIDER - CARE PROVIDER_API CALL
Ari Ye  INTERNAL MEDICINE  [address], 2nd [place] [address]  Phone: [phone]  Fax: [phone]  Follow Up Time:     Rajesh Charles)  Adv Heart Fail Trnsplnt Cardio; Cardiovascular Disease; Internal Medicine  [address] [place] [address]  Phone: [phone]  Fax: [phone]  Follow Up Time:     Andre Duncan)  Cardiac Electrophysiology; Cardiology; Internal Medicine  [address] [place] [address]  Phone: [phone]  Fax: [phone]  Follow Up Time:

## 2023-02-23 NOTE — DISCHARGE NOTE PROVIDER - NSDCFUADDAPPT_GEN_ALL_CORE_FT
Please follow up with your primary care provider in 1-2 weeks following discharge from the hospital for continued monitoring and management.      Please follow up with your primary care provider in 1-2 weeks following discharge from the hospital for continued monitoring and management.    You have an appointment with heart failure at 1:30PM on 3/15/23 with Karlene. Please continue your current medication regimen until this time.     You have an appointment with EP (Cardiology team that placed your device) at 10:40AM on 3/8/23. Please refer to discharge instructions from team (listed below).

## 2023-02-23 NOTE — DISCHARGE NOTE PROVIDER - ATTENDING DISCHARGE PHYSICAL EXAM:
Chief Complaint   Patient presents with     Recheck Medication     Unspecified mood (affective) disorder        Attending Discharge Physical Examination:

## 2023-02-23 NOTE — ED ADULT NURSE REASSESSMENT NOTE - NS ED NURSE REASSESS COMMENT FT1
pt laying in stretcher. no acute signs of distress at this time. respirations even and unlabored. denies SOB, chest pain, headache.  Pt reports 5/10 right shoulder pain. Will medicate per PRN order.
received report from night RN. pt resting in stretcher. made comfortable. denies SOB, headache, chest pain at this time. no acute distress noted. vitals documented. pending admission bed. safety maintained.
report given to LEONEL Ahmadi [id number]. pt to go to bed 424a. pt resting in stretcher comfortably. pending transport.
Pt a&ox4, nsr on cardiac monitor, denies pain at this time. Respirations are even and unlabored, no signs of respiratory distress.
Pt a&ox4, NSR on cardiac monitor, denies CP, SOB, or dyspnea at this time. Respirations are even and unlabored, no signs of respiratory distress.

## 2023-02-23 NOTE — DISCHARGE NOTE PROVIDER - NSDCCPCAREPLAN_GEN_ALL_CORE_FT
PRINCIPAL DISCHARGE DIAGNOSIS  Diagnosis: SOB (shortness of breath)  Assessment and Plan of Treatment:       SECONDARY DISCHARGE DIAGNOSES  Diagnosis: Chronic systolic congestive heart failure  Assessment and Plan of Treatment:      PRINCIPAL DISCHARGE DIAGNOSIS  Diagnosis: Chronic systolic congestive heart failure  Assessment and Plan of Treatment: You came in to hospital for ICD placement. You had ICD placed on 2/27 because of your low ejection fraction. You were seen by heart failure team who stated you were not volume overloaded. You were continued on your heart failure medications, You need to continue taking them and follow up with your cardiologist closely. If you have any chest pain that is pressure like , please call 911 right away as this can be life threating. You did complain of chest discomfort at site of ICD placemement which improved with oxycodone. If your symptoms do not improve, or you have any fevers, swelling or drainage at site of ICD call your Cardiologist or call 911.      SECONDARY DISCHARGE DIAGNOSES  Diagnosis: S/P ICD (internal cardiac defibrillator) procedure  Assessment and Plan of Treatment: As above

## 2023-02-24 LAB
ANION GAP SERPL CALC-SCNC: 11 MMOL/L — SIGNIFICANT CHANGE UP (ref 7–14)
APTT BLD: 29.8 SEC — SIGNIFICANT CHANGE UP (ref 27–36.3)
BASOPHILS # BLD AUTO: 0.04 K/UL — SIGNIFICANT CHANGE UP (ref 0–0.2)
BASOPHILS NFR BLD AUTO: 0.8 % — SIGNIFICANT CHANGE UP (ref 0–2)
BUN SERPL-MCNC: 26 MG/DL — HIGH (ref 7–23)
CALCIUM SERPL-MCNC: 9.2 MG/DL — SIGNIFICANT CHANGE UP (ref 8.4–10.5)
CHLORIDE SERPL-SCNC: 102 MMOL/L — SIGNIFICANT CHANGE UP (ref 98–107)
CO2 SERPL-SCNC: 23 MMOL/L — SIGNIFICANT CHANGE UP (ref 22–31)
CREAT SERPL-MCNC: 1.63 MG/DL — HIGH (ref 0.5–1.3)
EGFR: 44 ML/MIN/1.73M2 — LOW
EOSINOPHIL # BLD AUTO: 0.12 K/UL — SIGNIFICANT CHANGE UP (ref 0–0.5)
EOSINOPHIL NFR BLD AUTO: 2.3 % — SIGNIFICANT CHANGE UP (ref 0–6)
GLUCOSE SERPL-MCNC: 93 MG/DL — SIGNIFICANT CHANGE UP (ref 70–99)
HCT VFR BLD CALC: 45.1 % — SIGNIFICANT CHANGE UP (ref 39–50)
HGB BLD-MCNC: 14.4 G/DL — SIGNIFICANT CHANGE UP (ref 13–17)
IANC: 2.33 K/UL — SIGNIFICANT CHANGE UP (ref 1.8–7.4)
IMM GRANULOCYTES NFR BLD AUTO: 0.2 % — SIGNIFICANT CHANGE UP (ref 0–0.9)
INR BLD: 1.06 RATIO — SIGNIFICANT CHANGE UP (ref 0.88–1.16)
LYMPHOCYTES # BLD AUTO: 2.18 K/UL — SIGNIFICANT CHANGE UP (ref 1–3.3)
LYMPHOCYTES # BLD AUTO: 42.2 % — SIGNIFICANT CHANGE UP (ref 13–44)
MAGNESIUM SERPL-MCNC: 2.1 MG/DL — SIGNIFICANT CHANGE UP (ref 1.6–2.6)
MCHC RBC-ENTMCNC: 28.5 PG — SIGNIFICANT CHANGE UP (ref 27–34)
MCHC RBC-ENTMCNC: 31.9 GM/DL — LOW (ref 32–36)
MCV RBC AUTO: 89.1 FL — SIGNIFICANT CHANGE UP (ref 80–100)
MONOCYTES # BLD AUTO: 0.48 K/UL — SIGNIFICANT CHANGE UP (ref 0–0.9)
MONOCYTES NFR BLD AUTO: 9.3 % — SIGNIFICANT CHANGE UP (ref 2–14)
NEUTROPHILS # BLD AUTO: 2.33 K/UL — SIGNIFICANT CHANGE UP (ref 1.8–7.4)
NEUTROPHILS NFR BLD AUTO: 45.2 % — SIGNIFICANT CHANGE UP (ref 43–77)
NRBC # BLD: 0 /100 WBCS — SIGNIFICANT CHANGE UP (ref 0–0)
NRBC # FLD: 0 K/UL — SIGNIFICANT CHANGE UP (ref 0–0)
PHOSPHATE SERPL-MCNC: 3 MG/DL — SIGNIFICANT CHANGE UP (ref 2.5–4.5)
PLATELET # BLD AUTO: 223 K/UL — SIGNIFICANT CHANGE UP (ref 150–400)
POTASSIUM SERPL-MCNC: 3.8 MMOL/L — SIGNIFICANT CHANGE UP (ref 3.5–5.3)
POTASSIUM SERPL-SCNC: 3.8 MMOL/L — SIGNIFICANT CHANGE UP (ref 3.5–5.3)
PROTHROM AB SERPL-ACNC: 12.3 SEC — SIGNIFICANT CHANGE UP (ref 10.5–13.4)
RBC # BLD: 5.06 M/UL — SIGNIFICANT CHANGE UP (ref 4.2–5.8)
RBC # FLD: 13.7 % — SIGNIFICANT CHANGE UP (ref 10.3–14.5)
SARS-COV-2 RNA SPEC QL NAA+PROBE: SIGNIFICANT CHANGE UP
SODIUM SERPL-SCNC: 136 MMOL/L — SIGNIFICANT CHANGE UP (ref 135–145)
WBC # BLD: 5.16 K/UL — SIGNIFICANT CHANGE UP (ref 3.8–10.5)
WBC # FLD AUTO: 5.16 K/UL — SIGNIFICANT CHANGE UP (ref 3.8–10.5)

## 2023-02-24 PROCEDURE — 99232 SBSQ HOSP IP/OBS MODERATE 35: CPT

## 2023-02-24 PROCEDURE — 99233 SBSQ HOSP IP/OBS HIGH 50: CPT

## 2023-02-24 RX ADMIN — SACUBITRIL AND VALSARTAN 1 TABLET(S): 24; 26 TABLET, FILM COATED ORAL at 17:44

## 2023-02-24 RX ADMIN — CLOPIDOGREL BISULFATE 75 MILLIGRAM(S): 75 TABLET, FILM COATED ORAL at 12:13

## 2023-02-24 RX ADMIN — Medication 25 MILLIGRAM(S): at 12:13

## 2023-02-24 RX ADMIN — Medication 81 MILLIGRAM(S): at 12:13

## 2023-02-24 RX ADMIN — Medication 25 MILLIGRAM(S): at 05:32

## 2023-02-24 RX ADMIN — Medication 25 MILLIGRAM(S): at 21:31

## 2023-02-24 RX ADMIN — ATORVASTATIN CALCIUM 40 MILLIGRAM(S): 80 TABLET, FILM COATED ORAL at 21:30

## 2023-02-24 RX ADMIN — LEVETIRACETAM 500 MILLIGRAM(S): 250 TABLET, FILM COATED ORAL at 05:31

## 2023-02-24 RX ADMIN — SACUBITRIL AND VALSARTAN 1 TABLET(S): 24; 26 TABLET, FILM COATED ORAL at 05:33

## 2023-02-24 RX ADMIN — CARVEDILOL PHOSPHATE 25 MILLIGRAM(S): 80 CAPSULE, EXTENDED RELEASE ORAL at 05:32

## 2023-02-24 RX ADMIN — LEVETIRACETAM 500 MILLIGRAM(S): 250 TABLET, FILM COATED ORAL at 17:45

## 2023-02-24 RX ADMIN — CARVEDILOL PHOSPHATE 25 MILLIGRAM(S): 80 CAPSULE, EXTENDED RELEASE ORAL at 17:45

## 2023-02-24 NOTE — PROGRESS NOTE ADULT - SUBJECTIVE AND OBJECTIVE BOX
[facility]   MD ROSMERY FLOREZ MD KRISTINE SOLTANPOUR, MARY ARRINGTON    TEL:  OFFICE: [phone]  From 5pm-7am Answering Service [phone]    -- RENAL FOLLOW UP NOTE ---Date of Service 02-24-23 @ 13:12    Patient is a 72y old  Male who presents with a chief complaint of Increased SOB, referred for ICD evaluation (24 Feb 2023 12:10)      Patient seen and examined at bedside. No chest pain/sob    VITALS:  T(F): 97.8 (02-24-23 @ 11:55), Max: 98.4 (02-23-23 @ 18:25)  HR: 71 (02-24-23 @ 11:55)  BP: 106/71 (02-24-23 @ 11:55)  RR: 18 (02-24-23 @ 11:55)  SpO2: 100% (02-24-23 @ 11:55)  Wt(kg): --    02-23 @ 07:01  -  02-24 @ 07:00  --------------------------------------------------------  IN: 418 mL / OUT: 0 mL / NET: 418 mL          PHYSICAL EXAM:  General: NAD  Neck: No JVD  Respiratory: CTAB, no wheezes, rales or rhonchi  Cardiovascular: S1, S2, RRR  Gastrointestinal: BS+, soft, NT/ND  Extremities: No peripheral edema    Hospital Medications:   MEDICATIONS  (STANDING):  aspirin enteric coated 81 milliGRAM(s) Oral daily  atorvastatin 40 milliGRAM(s) Oral at bedtime  carvedilol 25 milliGRAM(s) Oral every 12 hours  clopidogrel Tablet 75 milliGRAM(s) Oral daily  hydrALAZINE 25 milliGRAM(s) Oral every 8 hours  levETIRAcetam 500 milliGRAM(s) Oral two times a day  sacubitril 24 mG/valsartan 26 mG 1 Tablet(s) Oral two times a day      LABS:  02-24    136  |  102  |  26<H>  ----------------------------<  93  3.8   |  23  |  1.63<H>    Ca    9.2      24 Feb 2023 05:20  Phos  3.0     02-24  Mg     2.10     02-24    TPro  8.3  /  Alb  4.4  /  TBili  0.7  /  DBili      /  AST  25  /  ALT  17  /  AlkPhos  98  02-22    Creatinine Trend: 1.63 <--, 1.53 <--, 1.53 <--    Phosphorus Level, Serum: 3.0 mg/dL (02-24 @ 05:20)                              14.4   5.16  )-----------( 223      ( 24 Feb 2023 05:20 )             45.1     Urine Studies:      HbA1c 5.6      [02-29-20 @ 05:30]  TSH 0.79      [02-23-23 @ 05:35]  Lipid: chol 72, , HDL 34, LDL --      [02-23-23 @ 05:35]        RADIOLOGY & ADDITIONAL STUDIES:

## 2023-02-24 NOTE — PROGRESS NOTE ADULT - SUBJECTIVE AND OBJECTIVE BOX
Patient denies shortness of breath, chest pain, palpitations or dizziness.   Patient NPO for ICD later today.   All questions answered.       Vital Signs Last 24 Hrs  T(C): 36.4 (24 Feb 2023 05:20), Max: 36.9 (23 Feb 2023 18:25)  T(F): 97.6 (24 Feb 2023 05:20), Max: 98.4 (23 Feb 2023 18:25)  HR: 71 (24 Feb 2023 05:20) (68 - 80)  BP: 115/78 (24 Feb 2023 05:20) (92/55 - 126/83)  BP(mean): --  RR: 18 (24 Feb 2023 05:20) (17 - 18)  SpO2: 99% (24 Feb 2023 05:20) (99% - 100%)    Parameters below as of 24 Feb 2023 05:20  Patient On (Oxygen Delivery Method): room air      Telemetry: Sinus rhythm 80's.    MEDICATIONS  (STANDING):  aspirin enteric coated 81 milliGRAM(s) Oral daily  atorvastatin 40 milliGRAM(s) Oral at bedtime  carvedilol 25 milliGRAM(s) Oral every 12 hours  clopidogrel Tablet 75 milliGRAM(s) Oral daily  hydrALAZINE 25 milliGRAM(s) Oral every 8 hours  levETIRAcetam 500 milliGRAM(s) Oral two times a day  sacubitril 24 mG/valsartan 26 mG 1 Tablet(s) Oral two times a day    MEDICATIONS  (PRN):  acetaminophen     Tablet .. 650 milliGRAM(s) Oral every 6 hours PRN Temp greater or equal to 38C (100.4F), Mild Pain (1 - 3)  melatonin 3 milliGRAM(s) Oral at bedtime PRN Insomnia          Physical exam:   Gen- well developed well nourished NAD.   Resp- clear to auscultation. No wheezing, rales or rhonchi  CV- S1 and s2 RRR. No murmurs, gallops or rubs  ABD- soft nontender + bowel sounds  EXT- no edema no calf tenderness.   Neuro- grossly nonfocal.                            14.4   5.16  )-----------( 223      ( 24 Feb 2023 05:20 )             45.1     PT/INR - ( 24 Feb 2023 05:20 )   PT: 12.3 sec;   INR: 1.06 ratio         PTT - ( 24 Feb 2023 05:20 )  PTT:29.8 sec  02-24    136  |  102  |  26<H>  ----------------------------<  93  3.8   |  23  |  1.63<H>    Ca    9.2      24 Feb 2023 05:20  Phos  3.0     02-24  Mg     2.10     02-24    TPro  8.3  /  Alb  4.4  /  TBili  0.7  /  DBili  x   /  AST  25  /  ALT  17  /  AlkPhos  98  02-22        < from: Transthoracic Echocardiogram (02.23.23 @ 14:19) >  Patient name: JACQUES, SAINTONGE  YOB: 1950   Age: 72 (M)   MR#: [id number]  Study Date: 2/23/2023  [facility]  Study quality: Technically Difficult  Referring Physician: Naresh Taylor MD  Blood Pressure: 103/72 mmHg  Height: 165 cm  Weight: 74 kg  BSA: 1.8 m2  ------------------------------------------------------------------------  PROCEDURE: Transthoracic echocardiogram with 2-D, M-Mode  and complete spectral and color flow Doppler.  INDICATION: Abnormal electrocardiogram (ECG) (EKG) (R94.31)  ------------------------------------------------------------------------  DIMENSIONS:  Dimensions:     Normal Values:  LA:     3.4 cm    2.0 - 4.0 cm  Ao:     3.4 cm    2.0 - 3.8 cm  SEPTUM: 0.8 cm    0.6 - 1.2 cm  PWT:    0.8 cm    0.6 - 1.1 cm  LVIDd:  4.6 cm    3.0 - 5.6 cm  LVIDs:  4.0 cm    1.8 - 4.0 cm  Derived Variables:  LVMI: 65 g/m2  RWT: 0.34  Fractional short: 13 %  Ejection Fraction (Modified Bates Rule): 32 %  ------------------------------------------------------------------------  OBSERVATIONS:  Mitral Valve: Mitral annular calcification, otherwise  normal mitral valve. Mild mitral regurgitation.  Aortic Root: Normal aortic root.  Aortic Valve: Aortic valve leaflet morphology not well  visualized. Mild aortic regurgitation.  Left Atrium: Normal left atrium.  LA volume index = 24  cc/m2.  Left Ventricle: Moderate global left ventricular systolic  dysfunction. Normal left ventricular internal dimensions  and wall thicknesses. Mild diastolic dysfunction (Stage I).  Right Heart: Normal right atrium. Normal right ventricular  size and function. Normal tricuspid valve. Minimal  tricuspid regurgitation. Normal pulmonic valve.  Pericardium/PleuraNormal pericardiumwith no pericardial  effusion.  CONCLUSIONS:  1. Mitral annular calcification, otherwise normal mitral  valve. Mild mitral regurgitation.  2. Aortic valve leaflet morphology not well visualized.  Mild aortic regurgitation.  3. Normal left ventricular internal dimensions and wall  thicknesses.  4. Moderate global left ventricular systolic dysfunction.  5. Mild diastolic dysfunction (Stage I).  6. Normal right ventricular size and function.  ------------------------------------------------------------------------  Confirmed on  2/23/2023 - 15:31:07 by Nilton Rust M.D.,  [facility], [facility]

## 2023-02-24 NOTE — PROGRESS NOTE ADULT - ASSESSMENT
73yo man with MHx of NICM, severe sys HF (LVEF 35-40% with GDMT from 25-30% ), severe functional MR, HTN, and CKD III (baseline Cr 1.6),  seizure/cardiac arrest, prolonged hospitalization at [facility] 12/19/2022 was d/c to rehab on 1/6/2023; course was c/b a coma (per Allscripts review) Per the patient, he was at [facility] since 1/6/2023 and was discharged home on 2/20/2023. He was told by the rehab to make an appointment with his HF doctor. Dr. Charles. Patient had an appointment with him today and was recommended to come to [facility] for an ICD evaluation    CKD [id number]  baseline ~ 1.5-1.6  follows up with Dr. Ye  renal function stable  monitor  avoid nephrotoxic agents    HFrEF  EF of 32% on Echo 2/23/22  f/u team    HTN  controlled  monitor

## 2023-02-24 NOTE — CHART NOTE - NSCHARTNOTEFT_GEN_A_CORE
patient was scheduled for ICD placement today per EP.   ICD implantation was canceled due to emergency.  Pt will be scheduled for ICD placement Monday 02/27/23.  Discussed with EP.

## 2023-02-24 NOTE — PHYSICAL THERAPY INITIAL EVALUATION ADULT - ADDITIONAL COMMENTS
pt owns a straight cane     Pt. left seated at edge of bed with all tubes/lines intact, call bell in reach and in NAD.

## 2023-02-24 NOTE — PROGRESS NOTE ADULT - ASSESSMENT
73yo man with MHx of NICM, severe sys HF (LVEF 35-40% with GDMT from 25-30%), severe functional MR, HTN, and CKD III (baseline Cr 1.6),  seizure/cardiac arrest, recent prolonged hospitalization at [facility] c/b a coma a/w BARROW, being evaluated for ICD by EP

## 2023-02-24 NOTE — PROGRESS NOTE ADULT - ASSESSMENT
This is 71 year old man with pmhx of NICM, HF (LVEF 35-40% with GDMT from 25-30% ) with severe functional MR, uncontrolled HTN, CKD III (baseline Cr 1.6), and seizure/cardiac arrest, hospitalized at [facility] 2/19/2022 to 1/6/2023 with course c/b a coma x 3 days (as per the niece, Maddy) sent to [facility] for ICD evaluation. Of note, patient was seen by Dr. Duncan in 9/21/2020 who recommended ICD for primary prevention of sudden cardiac death given his severe LV dysfunction despite being on GDMT for more than 3 months. EP again consulted for ICD evaluation. The alterative, risks, and benefits was explained in detail which included but not limited to bleeding requiring transfusion, infection, stroke, plural effusion, esophageal injury, pericardial effusion, cardiac tamponade requiring chest tube, intubation, and death. I also explained what it means to live with an ICD. Patient and his niece expressed understanding and all questions were answered. Patient now agreeing to the device implant and has consented.   ECHO:  mild MR, normal left atrium, LVEF 32% mild diastolic dysfunction.   Plan:   - Monitor electrolytes and replete K to 4 and Mg to 2  - NPO for ICD implantation later today  - COVID-19 negative 2/22/2023  - Continue care per primary team

## 2023-02-24 NOTE — PROGRESS NOTE ADULT - SUBJECTIVE AND OBJECTIVE BOX
[facility] Division of Hospital Medicine  Markel Huber MD  Pager [phone]    Patient is a 72y old  Male who presents with a chief complaint of Increased SOB, referred for ICD evaluation (24 Feb 2023 08:58)      SUBJECTIVE / OVERNIGHT EVENTS: clinically stable      MEDICATIONS  (STANDING):  aspirin enteric coated 81 milliGRAM(s) Oral daily  atorvastatin 40 milliGRAM(s) Oral at bedtime  carvedilol 25 milliGRAM(s) Oral every 12 hours  clopidogrel Tablet 75 milliGRAM(s) Oral daily  hydrALAZINE 25 milliGRAM(s) Oral every 8 hours  levETIRAcetam 500 milliGRAM(s) Oral two times a day  sacubitril 24 mG/valsartan 26 mG 1 Tablet(s) Oral two times a day    MEDICATIONS  (PRN):  acetaminophen     Tablet .. 650 milliGRAM(s) Oral every 6 hours PRN Temp greater or equal to 38C (100.4F), Mild Pain (1 - 3)  melatonin 3 milliGRAM(s) Oral at bedtime PRN Insomnia      CAPILLARY BLOOD GLUCOSE        I&O's Summary    23 Feb 2023 07:01  -  24 Feb 2023 07:00  --------------------------------------------------------  IN: 418 mL / OUT: 0 mL / NET: 418 mL        PHYSICAL EXAM:  Vital Signs Last 24 Hrs  T(C): 36.6 (24 Feb 2023 11:55), Max: 36.9 (23 Feb 2023 18:25)  T(F): 97.8 (24 Feb 2023 11:55), Max: 98.4 (23 Feb 2023 18:25)  HR: 71 (24 Feb 2023 11:55) (68 - 80)  BP: 106/71 (24 Feb 2023 11:55) (92/55 - 126/83)  BP(mean): --  RR: 18 (24 Feb 2023 11:55) (17 - 18)  SpO2: 100% (24 Feb 2023 11:55) (99% - 100%)    Parameters below as of 24 Feb 2023 11:55  Patient On (Oxygen Delivery Method): room air      CONSTITUTIONAL: NAD  EYES: EOMI, conjunctiva and sclera clear  ENMT: Moist oral mucosa  NECK: Supple  RESPIRATORY: Breathing unlabored, CTAB  CARDIOVASCULAR: S1S2 no MRG  ABDOMEN: Nontender to palpation, normoactive bowel sounds, no rebound/guarding  MUSCULOSKELETAL: no clubbing or cyanosis of digits  NEUROLOGY: No focal deficits   SKIN: No rashes or lesions    LABS:                        14.4   5.16  )-----------( 223      ( 24 Feb 2023 05:20 )             45.1     02-24    136  |  102  |  26<H>  ----------------------------<  93  3.8   |  23  |  1.63<H>    Ca    9.2      24 Feb 2023 05:20  Phos  3.0     02-24  Mg     2.10     02-24    TPro  8.3  /  Alb  4.4  /  TBili  0.7  /  DBili  x   /  AST  25  /  ALT  17  /  AlkPhos  98  02-22    PT/INR - ( 24 Feb 2023 05:20 )   PT: 12.3 sec;   INR: 1.06 ratio         PTT - ( 24 Feb 2023 05:20 )  PTT:29.8 sec        CODE: FULL

## 2023-02-25 LAB
ANION GAP SERPL CALC-SCNC: 9 MMOL/L — SIGNIFICANT CHANGE UP (ref 7–14)
BUN SERPL-MCNC: 26 MG/DL — HIGH (ref 7–23)
CALCIUM SERPL-MCNC: 9 MG/DL — SIGNIFICANT CHANGE UP (ref 8.4–10.5)
CHLORIDE SERPL-SCNC: 105 MMOL/L — SIGNIFICANT CHANGE UP (ref 98–107)
CO2 SERPL-SCNC: 22 MMOL/L — SIGNIFICANT CHANGE UP (ref 22–31)
CREAT SERPL-MCNC: 1.46 MG/DL — HIGH (ref 0.5–1.3)
EGFR: 50 ML/MIN/1.73M2 — LOW
GLUCOSE SERPL-MCNC: 99 MG/DL — SIGNIFICANT CHANGE UP (ref 70–99)
HCT VFR BLD CALC: 45.4 % — SIGNIFICANT CHANGE UP (ref 39–50)
HGB BLD-MCNC: 14.6 G/DL — SIGNIFICANT CHANGE UP (ref 13–17)
MAGNESIUM SERPL-MCNC: 2.2 MG/DL — SIGNIFICANT CHANGE UP (ref 1.6–2.6)
MCHC RBC-ENTMCNC: 29 PG — SIGNIFICANT CHANGE UP (ref 27–34)
MCHC RBC-ENTMCNC: 32.2 GM/DL — SIGNIFICANT CHANGE UP (ref 32–36)
MCV RBC AUTO: 90.3 FL — SIGNIFICANT CHANGE UP (ref 80–100)
NRBC # BLD: 0 /100 WBCS — SIGNIFICANT CHANGE UP (ref 0–0)
NRBC # FLD: 0 K/UL — SIGNIFICANT CHANGE UP (ref 0–0)
PHOSPHATE SERPL-MCNC: 2.8 MG/DL — SIGNIFICANT CHANGE UP (ref 2.5–4.5)
PLATELET # BLD AUTO: 214 K/UL — SIGNIFICANT CHANGE UP (ref 150–400)
POTASSIUM SERPL-MCNC: 4.1 MMOL/L — SIGNIFICANT CHANGE UP (ref 3.5–5.3)
POTASSIUM SERPL-SCNC: 4.1 MMOL/L — SIGNIFICANT CHANGE UP (ref 3.5–5.3)
RBC # BLD: 5.03 M/UL — SIGNIFICANT CHANGE UP (ref 4.2–5.8)
RBC # FLD: 13.8 % — SIGNIFICANT CHANGE UP (ref 10.3–14.5)
SODIUM SERPL-SCNC: 136 MMOL/L — SIGNIFICANT CHANGE UP (ref 135–145)
WBC # BLD: 5.18 K/UL — SIGNIFICANT CHANGE UP (ref 3.8–10.5)
WBC # FLD AUTO: 5.18 K/UL — SIGNIFICANT CHANGE UP (ref 3.8–10.5)

## 2023-02-25 PROCEDURE — 99232 SBSQ HOSP IP/OBS MODERATE 35: CPT

## 2023-02-25 RX ADMIN — LEVETIRACETAM 500 MILLIGRAM(S): 250 TABLET, FILM COATED ORAL at 06:25

## 2023-02-25 RX ADMIN — CLOPIDOGREL BISULFATE 75 MILLIGRAM(S): 75 TABLET, FILM COATED ORAL at 11:10

## 2023-02-25 RX ADMIN — Medication 81 MILLIGRAM(S): at 11:10

## 2023-02-25 RX ADMIN — Medication 650 MILLIGRAM(S): at 07:18

## 2023-02-25 RX ADMIN — SACUBITRIL AND VALSARTAN 1 TABLET(S): 24; 26 TABLET, FILM COATED ORAL at 17:17

## 2023-02-25 RX ADMIN — ATORVASTATIN CALCIUM 40 MILLIGRAM(S): 80 TABLET, FILM COATED ORAL at 21:21

## 2023-02-25 RX ADMIN — CARVEDILOL PHOSPHATE 25 MILLIGRAM(S): 80 CAPSULE, EXTENDED RELEASE ORAL at 17:18

## 2023-02-25 RX ADMIN — LEVETIRACETAM 500 MILLIGRAM(S): 250 TABLET, FILM COATED ORAL at 17:21

## 2023-02-25 RX ADMIN — SACUBITRIL AND VALSARTAN 1 TABLET(S): 24; 26 TABLET, FILM COATED ORAL at 06:26

## 2023-02-25 RX ADMIN — Medication 25 MILLIGRAM(S): at 06:25

## 2023-02-25 NOTE — PROGRESS NOTE ADULT - ASSESSMENT
71yo man with MHx of NICM, severe sys HF (LVEF 35-40% with GDMT from 25-30% ), severe functional MR, HTN, and CKD III (baseline Cr 1.6),  seizure/cardiac arrest, prolonged hospitalization at [facility] 12/19/2022 was d/c to rehab on 1/6/2023; course was c/b a coma (per Allscripts review) Per the patient, he was at [facility] since 1/6/2023 and was discharged home on 2/20/2023. He was told by the rehab to make an appointment with his HF doctor. Dr. Charles. Patient had an appointment with him today and was recommended to come to [facility] for an ICD evaluation    CKD [id number]  His baseline ~ 1.5-1.6 and follows up with Dr. Ye.   His renal function is stable  - monitor BMP.  - avoid nephrotoxic agents    HFrEF  EF of 32% on Echo 2/23/22  f/u team    HTN  controlled  monitor

## 2023-02-25 NOTE — PROGRESS NOTE ADULT - ASSESSMENT
71yo man with MHx of NICM, severe sys HF (LVEF 35-40% with GDMT from 25-30%), severe functional MR, HTN, and CKD III (baseline Cr 1.6),  seizure/cardiac arrest, recent prolonged hospitalization at [facility] c/b a coma a/w BARROW, being evaluated for ICD by EP

## 2023-02-25 NOTE — PROGRESS NOTE ADULT - SUBJECTIVE AND OBJECTIVE BOX
[facility] Division of Hospital Medicine  Markel Huber MD  Pager [phone]    Patient is a 72y old  Male who presents with a chief complaint of Increased SOB, referred for ICD evaluation (24 Feb 2023 13:12)      SUBJECTIVE / OVERNIGHT EVENTS:  Denies complaints      MEDICATIONS  (STANDING):  aspirin enteric coated 81 milliGRAM(s) Oral daily  atorvastatin 40 milliGRAM(s) Oral at bedtime  carvedilol 25 milliGRAM(s) Oral every 12 hours  clopidogrel Tablet 75 milliGRAM(s) Oral daily  hydrALAZINE 25 milliGRAM(s) Oral every 8 hours  levETIRAcetam 500 milliGRAM(s) Oral two times a day  sacubitril 24 mG/valsartan 26 mG 1 Tablet(s) Oral two times a day    MEDICATIONS  (PRN):  acetaminophen     Tablet .. 650 milliGRAM(s) Oral every 6 hours PRN Temp greater or equal to 38C (100.4F), Mild Pain (1 - 3)  melatonin 3 milliGRAM(s) Oral at bedtime PRN Insomnia      CAPILLARY BLOOD GLUCOSE        I&O's Summary    24 Feb 2023 07:01  -  25 Feb 2023 07:00  --------------------------------------------------------  IN: 600 mL / OUT: 0 mL / NET: 600 mL        PHYSICAL EXAM:  Vital Signs Last 24 Hrs  T(C): 36.5 (25 Feb 2023 06:20), Max: 36.7 (25 Feb 2023 04:00)  T(F): 97.7 (25 Feb 2023 06:20), Max: 98 (25 Feb 2023 04:00)  HR: 75 (25 Feb 2023 06:20) (70 - 75)  BP: 93/64 (25 Feb 2023 06:20) (93/64 - 108/62)  BP(mean): --  RR: 18 (25 Feb 2023 06:20) (17 - 18)  SpO2: 98% (25 Feb 2023 06:20) (98% - 100%)    Parameters below as of 25 Feb 2023 06:20  Patient On (Oxygen Delivery Method): room air      CONSTITUTIONAL: NAD  EYES: EOMI, conjunctiva and sclera clear  ENMT: Moist oral mucosa  NECK: Supple  RESPIRATORY: Breathing unlabored, CTAB  CARDIOVASCULAR: S1S2 no MRG  ABDOMEN: Nontender to palpation, normoactive bowel sounds, no rebound/guarding  MUSCULOSKELETAL: no clubbing or cyanosis of digits  NEUROLOGY: No focal deficits   SKIN: No rashes or lesions    LABS:                        14.6   5.18  )-----------( 214      ( 25 Feb 2023 07:04 )             45.4     02-25    136  |  105  |  26<H>  ----------------------------<  99  4.1   |  22  |  1.46<H>    Ca    9.0      25 Feb 2023 07:04  Phos  2.8     02-25  Mg     2.20     02-25      PT/INR - ( 24 Feb 2023 05:20 )   PT: 12.3 sec;   INR: 1.06 ratio         PTT - ( 24 Feb 2023 05:20 )  PTT:29.8 sec            CODE: FULL

## 2023-02-25 NOTE — PROGRESS NOTE ADULT - SUBJECTIVE AND OBJECTIVE BOX
EMMETT, SAINTONGE  72y  Patient is a 72y old  Male who presents with a chief complaint of Increased SOB, referred for ICD evaluation (2023 12:46)    HPI:  Seen and examined.    HEALTH ISSUES - PROBLEM Dx:  BARROW (dyspnea on exertion)    Encounter for deep vein thrombosis (DVT) prophylaxis    Chronic systolic congestive heart failure          MEDICATIONS  (STANDING):  aspirin enteric coated 81 milliGRAM(s) Oral daily  atorvastatin 40 milliGRAM(s) Oral at bedtime  carvedilol 25 milliGRAM(s) Oral every 12 hours  clopidogrel Tablet 75 milliGRAM(s) Oral daily  hydrALAZINE 25 milliGRAM(s) Oral every 8 hours  levETIRAcetam 500 milliGRAM(s) Oral two times a day  sacubitril 24 mG/valsartan 26 mG 1 Tablet(s) Oral two times a day    MEDICATIONS  (PRN):  acetaminophen     Tablet .. 650 milliGRAM(s) Oral every 6 hours PRN Temp greater or equal to 38C (100.4F), Mild Pain (1 - 3)  melatonin 3 milliGRAM(s) Oral at bedtime PRN Insomnia    Vital Signs Last 24 Hrs  T(C): 36.4 (2023 17:10), Max: 36.7 (2023 04:00)  T(F): 97.6 (2023 17:10), Max: 98 (2023 04:00)  HR: 86 (2023 17:10) (70 - 86)  BP: 109/63 (2023 17:10) (93/64 - 109/63)  BP(mean): --  RR: 18 (2023 17:10) (17 - 18)  SpO2: 98% (2023 17:10) (98% - 100%)    Parameters below as of 2023 17:10  Patient On (Oxygen Delivery Method): room air      Daily     Daily Weight in k.4 (2023 06:20)    PHYSICAL EXAM:  Constitutional: He appears comfortable and not distressed. Not diaphoretic.    Neck:  The thyroid is normal. Trachea is midline.     Respiratory: The lungs are clear to auscultation. No dullness and expansion is normal.    Cardiovascular: S1 and S2 are normal. No mummurs, rubs or gallops are present.    Gastrointestinal: The abdomen is soft. No tenderness is present. No masses are present. Bowel sounds are normal.    Genitourinary: The bladder is not distended. No CVA tenderness is present.    Extremities: No edema is noted. No deformities are present.    Neurological: Cognition is normal. Tone, power and sensation are normal. Gait is steady.    Skin: No lesions are seen  or palpated.    Lymph Nodes: No lymphadenopathy is present.    Psychiatric: Mood is appropriate. No hallucinations or flight of ideas are noted.                              14.6   5.18  )-----------( 214      ( 2023 07:04 )             45.4     02-    136  |  105  |  26<H>  ----------------------------<  99  4.1   |  22  |  1.46<H>    Ca    9.0      2023 07:04  Phos  2.8       Mg     2.20

## 2023-02-26 LAB
ANION GAP SERPL CALC-SCNC: 8 MMOL/L — SIGNIFICANT CHANGE UP (ref 7–14)
BUN SERPL-MCNC: 22 MG/DL — SIGNIFICANT CHANGE UP (ref 7–23)
CALCIUM SERPL-MCNC: 9.3 MG/DL — SIGNIFICANT CHANGE UP (ref 8.4–10.5)
CHLORIDE SERPL-SCNC: 105 MMOL/L — SIGNIFICANT CHANGE UP (ref 98–107)
CO2 SERPL-SCNC: 22 MMOL/L — SIGNIFICANT CHANGE UP (ref 22–31)
CREAT SERPL-MCNC: 1.46 MG/DL — HIGH (ref 0.5–1.3)
EGFR: 50 ML/MIN/1.73M2 — LOW
GLUCOSE SERPL-MCNC: 110 MG/DL — HIGH (ref 70–99)
HCT VFR BLD CALC: 48.3 % — SIGNIFICANT CHANGE UP (ref 39–50)
HGB BLD-MCNC: 15.2 G/DL — SIGNIFICANT CHANGE UP (ref 13–17)
MAGNESIUM SERPL-MCNC: 2.1 MG/DL — SIGNIFICANT CHANGE UP (ref 1.6–2.6)
MCHC RBC-ENTMCNC: 27.9 PG — SIGNIFICANT CHANGE UP (ref 27–34)
MCHC RBC-ENTMCNC: 31.5 GM/DL — LOW (ref 32–36)
MCV RBC AUTO: 88.6 FL — SIGNIFICANT CHANGE UP (ref 80–100)
NRBC # BLD: 0 /100 WBCS — SIGNIFICANT CHANGE UP (ref 0–0)
NRBC # FLD: 0 K/UL — SIGNIFICANT CHANGE UP (ref 0–0)
PHOSPHATE SERPL-MCNC: 2.8 MG/DL — SIGNIFICANT CHANGE UP (ref 2.5–4.5)
PLATELET # BLD AUTO: 229 K/UL — SIGNIFICANT CHANGE UP (ref 150–400)
POTASSIUM SERPL-MCNC: 4.3 MMOL/L — SIGNIFICANT CHANGE UP (ref 3.5–5.3)
POTASSIUM SERPL-SCNC: 4.3 MMOL/L — SIGNIFICANT CHANGE UP (ref 3.5–5.3)
RBC # BLD: 5.45 M/UL — SIGNIFICANT CHANGE UP (ref 4.2–5.8)
RBC # FLD: 13.4 % — SIGNIFICANT CHANGE UP (ref 10.3–14.5)
SODIUM SERPL-SCNC: 135 MMOL/L — SIGNIFICANT CHANGE UP (ref 135–145)
WBC # BLD: 4.8 K/UL — SIGNIFICANT CHANGE UP (ref 3.8–10.5)
WBC # FLD AUTO: 4.8 K/UL — SIGNIFICANT CHANGE UP (ref 3.8–10.5)

## 2023-02-26 PROCEDURE — 99232 SBSQ HOSP IP/OBS MODERATE 35: CPT

## 2023-02-26 RX ADMIN — LEVETIRACETAM 500 MILLIGRAM(S): 250 TABLET, FILM COATED ORAL at 17:15

## 2023-02-26 RX ADMIN — CLOPIDOGREL BISULFATE 75 MILLIGRAM(S): 75 TABLET, FILM COATED ORAL at 11:58

## 2023-02-26 RX ADMIN — ATORVASTATIN CALCIUM 40 MILLIGRAM(S): 80 TABLET, FILM COATED ORAL at 21:04

## 2023-02-26 RX ADMIN — Medication 25 MILLIGRAM(S): at 05:31

## 2023-02-26 RX ADMIN — CARVEDILOL PHOSPHATE 25 MILLIGRAM(S): 80 CAPSULE, EXTENDED RELEASE ORAL at 17:14

## 2023-02-26 RX ADMIN — Medication 25 MILLIGRAM(S): at 14:56

## 2023-02-26 RX ADMIN — SACUBITRIL AND VALSARTAN 1 TABLET(S): 24; 26 TABLET, FILM COATED ORAL at 05:20

## 2023-02-26 RX ADMIN — Medication 81 MILLIGRAM(S): at 11:58

## 2023-02-26 RX ADMIN — Medication 25 MILLIGRAM(S): at 21:04

## 2023-02-26 RX ADMIN — Medication 25 MILLIGRAM(S): at 05:20

## 2023-02-26 RX ADMIN — CARVEDILOL PHOSPHATE 25 MILLIGRAM(S): 80 CAPSULE, EXTENDED RELEASE ORAL at 05:20

## 2023-02-26 RX ADMIN — LEVETIRACETAM 500 MILLIGRAM(S): 250 TABLET, FILM COATED ORAL at 05:34

## 2023-02-26 RX ADMIN — SACUBITRIL AND VALSARTAN 1 TABLET(S): 24; 26 TABLET, FILM COATED ORAL at 17:14

## 2023-02-26 NOTE — PROGRESS NOTE ADULT - SUBJECTIVE AND OBJECTIVE BOX
JACQUES, SAINTONGE  72y  Patient is a 72y old  Male who presents with a chief complaint of Increased SOB, referred for ICD evaluation (2023 12:52)    HPI:  Seen and examined. Followed for LIZBETH on CKD.    HEALTH ISSUES - PROBLEM Dx:  BARROW (dyspnea on exertion)    Encounter for deep vein thrombosis (DVT) prophylaxis    Chronic systolic congestive heart failure          MEDICATIONS  (STANDING):  aspirin enteric coated 81 milliGRAM(s) Oral daily  atorvastatin 40 milliGRAM(s) Oral at bedtime  carvedilol 25 milliGRAM(s) Oral every 12 hours  clopidogrel Tablet 75 milliGRAM(s) Oral daily  hydrALAZINE 25 milliGRAM(s) Oral every 8 hours  levETIRAcetam 500 milliGRAM(s) Oral two times a day  sacubitril 24 mG/valsartan 26 mG 1 Tablet(s) Oral two times a day    MEDICATIONS  (PRN):  acetaminophen     Tablet .. 650 milliGRAM(s) Oral every 6 hours PRN Temp greater or equal to 38C (100.4F), Mild Pain (1 - 3)  melatonin 3 milliGRAM(s) Oral at bedtime PRN Insomnia    Vital Signs Last 24 Hrs  T(C): 36.7 (2023 14:00), Max: 36.7 (2023 14:00)  T(F): 98 (2023 14:00), Max: 98 (2023 14:00)  HR: 80 (2023 14:00) (65 - 86)  BP: 100/60 (2023 14:00) (100/60 - 109/63)  BP(mean): --  RR: 18 (2023 14:00) (18 - 19)  SpO2: 98% (2023 14:00) (97% - 98%)    Parameters below as of 2023 14:00  Patient On (Oxygen Delivery Method): room air      Daily     Daily Weight in k.4 (2023 08:00)    PHYSICAL EXAM:  Constitutional: He appears comfortable and not distressed. Not diaphoretic.    Neck:  The thyroid is normal. Trachea is midline.     Breasts: Normal examination.    Respiratory: The lungs are clear to auscultation. No dullness and expansion is normal.    Cardiovascular: S1 and S2 are normal. No mummurs, rubs or gallops are present.    Gastrointestinal: The abdomen is soft. No tenderness is present. No masses are present. Bowel sounds are normal.    Genitourinary: The bladder is not distended. No CVA tenderness is present.    Extremities: No edema is noted. No deformities are present.    Neurological: Cognition is normal. Tone, power and sensation are normal. Gait is steady.    Skin: No leasions are seen  or palpated.    Lymph Nodes: No lymphadenopathy is present.    Psychiatric: Mood is appropriate. No hallucinations or flight of ideas are noted.                              15.2   4.80  )-----------( 229      ( 2023 07:32 )             48.3         135  |  105  |  22  ----------------------------<  110<H>  4.3   |  22  |  1.46<H>    Ca    9.3      2023 07:32  Phos  2.8       Mg     2.10

## 2023-02-26 NOTE — PROGRESS NOTE ADULT - ASSESSMENT
71yo man with MHx of NICM, severe sys HF (LVEF 35-40% with GDMT from 25-30% ), severe functional MR, HTN, and CKD III (baseline Cr 1.6),  seizure/cardiac arrest, prolonged hospitalization at [facility] 12/19/2022 was d/c to rehab on 1/6/2023; course was c/b a coma (per Allscripts review) Per the patient, he was at [facility] since 1/6/2023 and was discharged home on 2/20/2023. He was told by the rehab to make an appointment with his HF doctor. Dr. Charles. Patient had an appointment with him today and was recommended to come to [facility] for an ICD evaluation    CKD [id number]  His baseline ~ 1.5-1.6 and follows up with Dr. Ye.   His renal function is stable  - monitor BMP.  - avoid nephrotoxic agents    HFrEF  EF of 32% on Echo 2/23/22  f/u team    HTN  controlled  monitor

## 2023-02-26 NOTE — PROGRESS NOTE ADULT - SUBJECTIVE AND OBJECTIVE BOX
[facility] Division of Hospital Medicine  Markel Huber MD  Pager [phone]    Patient is a 72y old  Male who presents with a chief complaint of Increased SOB, referred for ICD evaluation (25 Feb 2023 19:07)      SUBJECTIVE / OVERNIGHT EVENTS: denies c/o      MEDICATIONS  (STANDING):  aspirin enteric coated 81 milliGRAM(s) Oral daily  atorvastatin 40 milliGRAM(s) Oral at bedtime  carvedilol 25 milliGRAM(s) Oral every 12 hours  clopidogrel Tablet 75 milliGRAM(s) Oral daily  hydrALAZINE 25 milliGRAM(s) Oral every 8 hours  levETIRAcetam 500 milliGRAM(s) Oral two times a day  sacubitril 24 mG/valsartan 26 mG 1 Tablet(s) Oral two times a day    MEDICATIONS  (PRN):  acetaminophen     Tablet .. 650 milliGRAM(s) Oral every 6 hours PRN Temp greater or equal to 38C (100.4F), Mild Pain (1 - 3)  melatonin 3 milliGRAM(s) Oral at bedtime PRN Insomnia      CAPILLARY BLOOD GLUCOSE        I&O's Summary    25 Feb 2023 07:01  -  26 Feb 2023 07:00  --------------------------------------------------------  IN: 360 mL / OUT: 0 mL / NET: 360 mL    26 Feb 2023 07:01  -  26 Feb 2023 12:52  --------------------------------------------------------  IN: 100 mL / OUT: 0 mL / NET: 100 mL        PHYSICAL EXAM:  Vital Signs Last 24 Hrs  T(C): 36.4 (26 Feb 2023 12:00), Max: 36.4 (25 Feb 2023 17:10)  T(F): 97.6 (26 Feb 2023 12:00), Max: 97.6 (25 Feb 2023 17:10)  HR: 74 (26 Feb 2023 12:00) (65 - 86)  BP: 102/64 (26 Feb 2023 12:00) (102/64 - 109/63)  BP(mean): --  RR: 19 (26 Feb 2023 12:00) (18 - 19)  SpO2: 97% (26 Feb 2023 12:00) (97% - 98%)    Parameters below as of 26 Feb 2023 12:00  Patient On (Oxygen Delivery Method): room air      CONSTITUTIONAL: NAD  EYES: EOMI, conjunctiva and sclera clear  ENMT: Moist oral mucosa  NECK: Supple  RESPIRATORY: Breathing unlabored, CTAB  CARDIOVASCULAR: S1S2 no MRG  ABDOMEN: Nontender to palpation, normoactive bowel sounds, no rebound/guarding  MUSCULOSKELETAL: no clubbing or cyanosis of digits  NEUROLOGY: No focal deficits   SKIN: No rashes or lesions    LABS:                        15.2   4.80  )-----------( 229      ( 26 Feb 2023 07:32 )             48.3     02-26    135  |  105  |  22  ----------------------------<  110<H>  4.3   |  22  |  1.46<H>    Ca    9.3      26 Feb 2023 07:32  Phos  2.8     02-26  Mg     2.10     02-26          CODE: FULL

## 2023-02-26 NOTE — PROGRESS NOTE ADULT - ASSESSMENT
73yo man with MHx of NICM, severe sys HF (LVEF 35-40% with GDMT from 25-30%), severe functional MR, HTN, and CKD III (baseline Cr 1.6),  seizure/cardiac arrest, recent prolonged hospitalization at [facility] c/b a coma a/w BARROW, being evaluated for ICD by EP

## 2023-02-27 LAB
ANION GAP SERPL CALC-SCNC: 9 MMOL/L — SIGNIFICANT CHANGE UP (ref 7–14)
APTT BLD: 30 SEC — SIGNIFICANT CHANGE UP (ref 27–36.3)
BUN SERPL-MCNC: 24 MG/DL — HIGH (ref 7–23)
CALCIUM SERPL-MCNC: 9.4 MG/DL — SIGNIFICANT CHANGE UP (ref 8.4–10.5)
CHLORIDE SERPL-SCNC: 103 MMOL/L — SIGNIFICANT CHANGE UP (ref 98–107)
CO2 SERPL-SCNC: 23 MMOL/L — SIGNIFICANT CHANGE UP (ref 22–31)
CREAT SERPL-MCNC: 1.58 MG/DL — HIGH (ref 0.5–1.3)
EGFR: 46 ML/MIN/1.73M2 — LOW
GLUCOSE SERPL-MCNC: 104 MG/DL — HIGH (ref 70–99)
HCT VFR BLD CALC: 45 % — SIGNIFICANT CHANGE UP (ref 39–50)
HGB BLD-MCNC: 14.4 G/DL — SIGNIFICANT CHANGE UP (ref 13–17)
INR BLD: 1.11 RATIO — SIGNIFICANT CHANGE UP (ref 0.88–1.16)
MAGNESIUM SERPL-MCNC: 2 MG/DL — SIGNIFICANT CHANGE UP (ref 1.6–2.6)
MCHC RBC-ENTMCNC: 28.5 PG — SIGNIFICANT CHANGE UP (ref 27–34)
MCHC RBC-ENTMCNC: 32 GM/DL — SIGNIFICANT CHANGE UP (ref 32–36)
MCV RBC AUTO: 88.9 FL — SIGNIFICANT CHANGE UP (ref 80–100)
NRBC # BLD: 0 /100 WBCS — SIGNIFICANT CHANGE UP (ref 0–0)
NRBC # FLD: 0 K/UL — SIGNIFICANT CHANGE UP (ref 0–0)
PHOSPHATE SERPL-MCNC: 3.1 MG/DL — SIGNIFICANT CHANGE UP (ref 2.5–4.5)
PLATELET # BLD AUTO: 211 K/UL — SIGNIFICANT CHANGE UP (ref 150–400)
POTASSIUM SERPL-MCNC: 4.2 MMOL/L — SIGNIFICANT CHANGE UP (ref 3.5–5.3)
POTASSIUM SERPL-SCNC: 4.2 MMOL/L — SIGNIFICANT CHANGE UP (ref 3.5–5.3)
PROTHROM AB SERPL-ACNC: 12.9 SEC — SIGNIFICANT CHANGE UP (ref 10.5–13.4)
RBC # BLD: 5.06 M/UL — SIGNIFICANT CHANGE UP (ref 4.2–5.8)
RBC # FLD: 13.6 % — SIGNIFICANT CHANGE UP (ref 10.3–14.5)
SARS-COV-2 RNA SPEC QL NAA+PROBE: SIGNIFICANT CHANGE UP
SODIUM SERPL-SCNC: 135 MMOL/L — SIGNIFICANT CHANGE UP (ref 135–145)
WBC # BLD: 5.09 K/UL — SIGNIFICANT CHANGE UP (ref 3.8–10.5)
WBC # FLD AUTO: 5.09 K/UL — SIGNIFICANT CHANGE UP (ref 3.8–10.5)

## 2023-02-27 PROCEDURE — 99233 SBSQ HOSP IP/OBS HIGH 50: CPT

## 2023-02-27 PROCEDURE — 33249 INSJ/RPLCMT DEFIB W/LEAD(S): CPT | Mod: 59

## 2023-02-27 PROCEDURE — 93010 ELECTROCARDIOGRAM REPORT: CPT

## 2023-02-27 RX ORDER — ACETAMINOPHEN 500 MG
650 TABLET ORAL ONCE
Refills: 0 | Status: COMPLETED | OUTPATIENT
Start: 2023-02-27 | End: 2023-02-27

## 2023-02-27 RX ORDER — CEFAZOLIN SODIUM 1 G
1000 VIAL (EA) INJECTION EVERY 8 HOURS
Refills: 0 | Status: COMPLETED | OUTPATIENT
Start: 2023-02-27 | End: 2023-02-28

## 2023-02-27 RX ADMIN — Medication 81 MILLIGRAM(S): at 12:58

## 2023-02-27 RX ADMIN — Medication 650 MILLIGRAM(S): at 23:13

## 2023-02-27 RX ADMIN — SACUBITRIL AND VALSARTAN 1 TABLET(S): 24; 26 TABLET, FILM COATED ORAL at 05:09

## 2023-02-27 RX ADMIN — CLOPIDOGREL BISULFATE 75 MILLIGRAM(S): 75 TABLET, FILM COATED ORAL at 12:58

## 2023-02-27 RX ADMIN — SACUBITRIL AND VALSARTAN 1 TABLET(S): 24; 26 TABLET, FILM COATED ORAL at 19:10

## 2023-02-27 RX ADMIN — ATORVASTATIN CALCIUM 40 MILLIGRAM(S): 80 TABLET, FILM COATED ORAL at 21:16

## 2023-02-27 RX ADMIN — CARVEDILOL PHOSPHATE 25 MILLIGRAM(S): 80 CAPSULE, EXTENDED RELEASE ORAL at 19:10

## 2023-02-27 RX ADMIN — LEVETIRACETAM 500 MILLIGRAM(S): 250 TABLET, FILM COATED ORAL at 19:10

## 2023-02-27 RX ADMIN — Medication 650 MILLIGRAM(S): at 22:43

## 2023-02-27 RX ADMIN — LEVETIRACETAM 500 MILLIGRAM(S): 250 TABLET, FILM COATED ORAL at 05:08

## 2023-02-27 RX ADMIN — Medication 25 MILLIGRAM(S): at 05:09

## 2023-02-27 RX ADMIN — Medication 25 MILLIGRAM(S): at 21:15

## 2023-02-27 RX ADMIN — Medication 100 MILLIGRAM(S): at 22:46

## 2023-02-27 RX ADMIN — CARVEDILOL PHOSPHATE 25 MILLIGRAM(S): 80 CAPSULE, EXTENDED RELEASE ORAL at 05:09

## 2023-02-27 NOTE — CHART NOTE - NSCHARTNOTEFT_GEN_A_CORE
Patient s/p ICD placement, seen and examined at bedside, appears comfortable NAD noted, offers no complaints. Left chest wall / ICD site appears clean, dry, intact, no evidence of active bleeding, no hematoma, + pulses, good capillary refill. Continue to monitor. Patient s/p ICD placement today, seen and examined at bedside, appears comfortable NAD noted, offers no complaints. Left chest wall / ICD site appears clean, dry, intact, no evidence of active bleeding, no hematoma, + pulses, good capillary refill. Continue to monitor.

## 2023-02-27 NOTE — PROGRESS NOTE ADULT - ASSESSMENT
73yo man with MHx of NICM, severe sys HF (LVEF 35-40% with GDMT from 25-30% ), severe functional MR, HTN, and CKD III (baseline Cr 1.6),  seizure/cardiac arrest, prolonged hospitalization at [facility] 12/19/2022 was d/c to rehab on 1/6/2023; course was c/b a coma (per Allscripts review) Per the patient, he was at [facility] since 1/6/2023 and was discharged home on 2/20/2023. He was told by the rehab to make an appointment with his HF doctor. Dr. Charles. Patient had an appointment with him today and was recommended to come to [facility] for an ICD evaluation    CKD [id number]  His baseline ~ 1.5-1.6 and follows up with Dr. Ye.   His renal function is stable  - monitor BMP.  - avoid nephrotoxic agents    HFrEF  EF of 32% on Echo   pending ICD  f/u team    HTN  controlled  monitor

## 2023-02-27 NOTE — PROGRESS NOTE ADULT - SUBJECTIVE AND OBJECTIVE BOX
Vital Signs Last 24 Hrs  T(C): 36.4 (27 Feb 2023 05:00), Max: 36.7 (26 Feb 2023 14:00)  T(F): 97.6 (27 Feb 2023 05:00), Max: 98 (26 Feb 2023 14:00)  HR: 71 (27 Feb 2023 07:40) (66 - 80)  BP: 135/94 (27 Feb 2023 05:00) (100/60 - 135/94)  BP(mean): --  RR: 18 (27 Feb 2023 05:00) (17 - 19)  SpO2: 99% (27 Feb 2023 05:00) (97% - 99%)    Parameters below as of 27 Feb 2023 05:00  Patient On (Oxygen Delivery Method): room air        Telemetry: Normal sinus rhythm 60's.  PVC's/couplets.   MEDICATIONS  (STANDING):  aspirin enteric coated 81 milliGRAM(s) Oral daily  atorvastatin 40 milliGRAM(s) Oral at bedtime  carvedilol 25 milliGRAM(s) Oral every 12 hours  clopidogrel Tablet 75 milliGRAM(s) Oral daily  hydrALAZINE 25 milliGRAM(s) Oral every 8 hours  levETIRAcetam 500 milliGRAM(s) Oral two times a day  sacubitril 24 mG/valsartan 26 mG 1 Tablet(s) Oral two times a day    MEDICATIONS  (PRN):  acetaminophen     Tablet .. 650 milliGRAM(s) Oral every 6 hours PRN Temp greater or equal to 38C (100.4F), Mild Pain (1 - 3)  melatonin 3 milliGRAM(s) Oral at bedtime PRN Insomnia          Physical exam:   Gen- well developed well nourished NAD  Resp- clear to auscultation. No wheezing, rales or rhonchi  CV- S1 and S2 RRR. No murmurs, gallops or rubs  ABD- soft nontender + bowel sounds  EXT- no edema no calf tenderness. Extremities warm and dry.   Neuro- grossly nonfocal                            14.4   5.09  )-----------( 211      ( 27 Feb 2023 06:00 )             45.0     PT/INR - ( 27 Feb 2023 06:00 )   PT: 12.9 sec;   INR: 1.11 ratio         PTT - ( 27 Feb 2023 06:00 )  PTT:30.0 sec  02-27    135  |  103  |  24<H>  ----------------------------<  104<H>  4.2   |  23  |  1.58<H>    Ca    9.4      27 Feb 2023 06:00  Phos  3.1     02-27  Mg     2.00     02-27                  Assessment and Plan:         Patient feeling well.  Denies chest pain, shortness of breath, palpitations or lightheadedness.   NPO for ICD implantation this morning.    Vital Signs Last 24 Hrs  T(C): 36.4 (27 Feb 2023 05:00), Max: 36.7 (26 Feb 2023 14:00)  T(F): 97.6 (27 Feb 2023 05:00), Max: 98 (26 Feb 2023 14:00)  HR: 71 (27 Feb 2023 07:40) (66 - 80)  BP: 135/94 (27 Feb 2023 05:00) (100/60 - 135/94)  BP(mean): --  RR: 18 (27 Feb 2023 05:00) (17 - 19)  SpO2: 99% (27 Feb 2023 05:00) (97% - 99%)    Parameters below as of 27 Feb 2023 05:00  Patient On (Oxygen Delivery Method): room air        Telemetry: Normal sinus rhythm 60's.  PVC's/couplets.   MEDICATIONS  (STANDING):  aspirin enteric coated 81 milliGRAM(s) Oral daily  atorvastatin 40 milliGRAM(s) Oral at bedtime  carvedilol 25 milliGRAM(s) Oral every 12 hours  clopidogrel Tablet 75 milliGRAM(s) Oral daily  hydrALAZINE 25 milliGRAM(s) Oral every 8 hours  levETIRAcetam 500 milliGRAM(s) Oral two times a day  sacubitril 24 mG/valsartan 26 mG 1 Tablet(s) Oral two times a day    MEDICATIONS  (PRN):  acetaminophen     Tablet .. 650 milliGRAM(s) Oral every 6 hours PRN Temp greater or equal to 38C (100.4F), Mild Pain (1 - 3)  melatonin 3 milliGRAM(s) Oral at bedtime PRN Insomnia          Physical exam:   Gen- well developed well nourished NAD. Able to lay float in bed. No loose teeth.  Resp- clear to auscultation. No wheezing, rales or rhonchi  CV- S1 and S2 RRR. No murmurs, gallops or rubs  ABD- soft nontender + bowel sounds  EXT- no edema no calf tenderness. Extremities warm and dry.   Neuro- grossly nonfocal                            14.4   5.09  )-----------( 211      ( 27 Feb 2023 06:00 )             45.0     PT/INR - ( 27 Feb 2023 06:00 )   PT: 12.9 sec;   INR: 1.11 ratio         PTT - ( 27 Feb 2023 06:00 )  PTT:30.0 sec  02-27    135  |  103  |  24<H>  ----------------------------<  104<H>  4.2   |  23  |  1.58<H>    Ca    9.4      27 Feb 2023 06:00  Phos  3.1     02-27  Mg     2.00     02-27      < from: Transthoracic Echocardiogram (02.23.23 @ 14:19) >  Patient name: JACQUES, SAINTONGE  YOB: 1950   Age: 72 (M)   MR#: [id number]  Study Date: 2/23/2023  [facility] Glendy Goodwin [facility]  Study quality: Technically Difficult  Referring Physician: Naresh Taylor MD  Blood Pressure: 103/72 mmHg  Height: 165 cm  Weight: 74 kg  BSA: 1.8 m2  ------------------------------------------------------------------------  PROCEDURE: Transthoracic echocardiogram with 2-D, M-Mode  and complete spectral and color flow Doppler.  INDICATION: Abnormal electrocardiogram (ECG) (EKG) (R94.31)  ------------------------------------------------------------------------  DIMENSIONS:  Dimensions:     Normal Values:  LA:     3.4 cm    2.0 - 4.0 cm  Ao:     3.4 cm    2.0 - 3.8 cm  SEPTUM: 0.8 cm    0.6 - 1.2 cm  PWT:    0.8 cm    0.6 - 1.1 cm  LVIDd:  4.6 cm    3.0 - 5.6 cm  LVIDs:  4.0 cm    1.8 - 4.0 cm  Derived Variables:  LVMI: 65 g/m2  RWT: 0.34  Fractional short: 13 %  Ejection Fraction (Modified Bates Rule): 32 %  ------------------------------------------------------------------------  OBSERVATIONS:  Mitral Valve: Mitral annular calcification, otherwise  normal mitral valve. Mild mitral regurgitation.  Aortic Root: Normal aortic root.  Aortic Valve: Aortic valve leaflet morphology not well  visualized. Mild aortic regurgitation.  Left Atrium: Normal left atrium.  LA volume index = 24  cc/m2.  Left Ventricle: Moderate global left ventricular systolic  dysfunction. Normal left ventricular internal dimensions  and wall thicknesses. Mild diastolic dysfunction (Stage I).  Right Heart: Normal right atrium. Normal right ventricular  size and function. Normal tricuspid valve. Minimal  tricuspid regurgitation. Normal pulmonic valve.  Pericardium/PleuraNormal pericardiumwith no pericardial  effusion.  ------------------------------------------------------------------------  CONCLUSIONS:  1. Mitral annular calcification, otherwise normal mitral  valve. Mild mitral regurgitation.  2. Aortic valve leaflet morphology not well visualized.  Mild aortic regurgitation.  3. Normal left ventricular internal dimensions and wall  thicknesses.  4. Moderate global left ventricular systolic dysfunction.  5. Mild diastolic dysfunction (Stage I).  6. Normal right ventricular size and function.  ------------------------------------------------------------------------  < from: Transthoracic Echocardiogram (02.23.23 @ 14:19) >  Confirmed on  2/23/2023 - 15:31:07 by Nilton Rust M.D.,  [facility], [facility]

## 2023-02-27 NOTE — PROGRESS NOTE ADULT - SUBJECTIVE AND OBJECTIVE BOX
PROGRESS NOTE:     Patient is a 72y old  Male who presents with a chief complaint of Increased SOB, referred for ICD evaluation (27 Feb 2023 11:37)      SUBJECTIVE / OVERNIGHT EVENTS: no acute event overnight. Reports feeling well. Denies chest pain, sob, abd pain, n/v.    ADDITIONAL REVIEW OF SYSTEMS:    MEDICATIONS  (STANDING):  aspirin enteric coated 81 milliGRAM(s) Oral daily  atorvastatin 40 milliGRAM(s) Oral at bedtime  carvedilol 25 milliGRAM(s) Oral every 12 hours  clopidogrel Tablet 75 milliGRAM(s) Oral daily  hydrALAZINE 25 milliGRAM(s) Oral every 8 hours  levETIRAcetam 500 milliGRAM(s) Oral two times a day  sacubitril 24 mG/valsartan 26 mG 1 Tablet(s) Oral two times a day    MEDICATIONS  (PRN):  acetaminophen     Tablet .. 650 milliGRAM(s) Oral every 6 hours PRN Temp greater or equal to 38C (100.4F), Mild Pain (1 - 3)  melatonin 3 milliGRAM(s) Oral at bedtime PRN Insomnia      CAPILLARY BLOOD GLUCOSE        I&O's Summary    26 Feb 2023 07:01  -  27 Feb 2023 07:00  --------------------------------------------------------  IN: 450 mL / OUT: 400 mL / NET: 50 mL        PHYSICAL EXAM:  Vital Signs Last 24 Hrs  T(C): 36.6 (27 Feb 2023 12:55), Max: 36.6 (26 Feb 2023 21:04)  T(F): 97.8 (27 Feb 2023 12:55), Max: 97.8 (26 Feb 2023 21:04)  HR: 73 (27 Feb 2023 12:55) (66 - 73)  BP: 116/74 (27 Feb 2023 12:55) (112/62 - 135/94)  BP(mean): --  RR: 18 (27 Feb 2023 12:55) (17 - 18)  SpO2: 99% (27 Feb 2023 12:55) (99% - 99%)    Parameters below as of 27 Feb 2023 12:55  Patient On (Oxygen Delivery Method): room air        CONSTITUTIONAL: NAD, laying in bed flat comfortably  RESPIRATORY: Normal respiratory effort; lungs are clear to auscultation bilaterally  CARDIOVASCULAR: Regular rate and rhythm, normal S1 and S2, no murmur/rub/gallop  ABDOMEN: Nontender to palpation, normoactive bowel sounds, no rebound/guarding  MUSCLOSKELETAL: no clubbing or cyanosis of digits; no joint swelling or tenderness to palpation  SKIN: no rash, erythema, lesion  PSYCH: A+O to person, place, and time; affect appropriate    LABS:                        14.4   5.09  )-----------( 211      ( 27 Feb 2023 06:00 )             45.0     02-27    135  |  103  |  24<H>  ----------------------------<  104<H>  4.2   |  23  |  1.58<H>    Ca    9.4      27 Feb 2023 06:00  Phos  3.1     02-27  Mg     2.00     02-27      PT/INR - ( 27 Feb 2023 06:00 )   PT: 12.9 sec;   INR: 1.11 ratio         PTT - ( 27 Feb 2023 06:00 )  PTT:30.0 sec            RADIOLOGY & ADDITIONAL TESTS:  Results Reviewed:   Imaging Personally Reviewed:  Electrocardiogram Personally Reviewed:    COORDINATION OF CARE:  Care Discussed with Consultants/Other Providers [Y/N]:  Prior or Outpatient Records Reviewed [Y/N]:

## 2023-02-27 NOTE — PROGRESS NOTE ADULT - ASSESSMENT
73yo man with MHx of NICM, severe sys HF (LVEF 35-40% with GDMT from 25-30%), severe functional MR, HTN, and CKD III (baseline Cr 1.6),  seizure/cardiac arrest, recent prolonged hospitalization at [facility] c/b a coma a/w BARROW, being evaluated for ICD by EP

## 2023-02-27 NOTE — PROGRESS NOTE ADULT - ASSESSMENT
This is 71 year old man with pmhx of NICM, HF (LVEF 35-40% with GDMT from 25-30% ) with severe functional MR, uncontrolled HTN, CKD III (baseline Cr 1.6), and seizure/cardiac arrest, hospitalized at [facility] 2/19/2022 to 1/6/2023 with course c/b a coma x 3 days (as per the niece, Maddy) sent to [facility] for ICD evaluation for secondary prevention.  Of note, patient was seen by Dr. Duncan in 9/21/2020 who recommended ICD for primary prevention of sudden cardiac death given his severe LV dysfunction despite being on GDMT for more than 3 months.   The risks,  benefits and alternatives were explained in detail which included but not limited to bleeding requiring transfusion, infection, stroke, plural effusion, esophageal injury, pericardial effusion, cardiac tamponade requiring chest tube, intubation, and death. I also explained what it means to live with an ICD. Patient and his niece expressed understanding and all questions were answered. Patient now agreeing to the device implant and has consented.   ECHO:  mild MR, normal left atrium, LVEF 32% mild diastolic dysfunction.   Plan:   - Monitor electrolytes and replete K to 4 and Mg to 2. Creatinine stable.   - NPO for ICD implantation later today  - COVID-19 negative 2/23/2023

## 2023-02-27 NOTE — CHART NOTE - NSCHARTNOTEFT_GEN_A_CORE
SAINTONGE JACQUES  [id number]    PROCEDURE:  Single Chamber ICD        INDICATION:  Secondary prevention ICD  HFrEF        ELECTROPHYSIOLOGIST(S):  MD Cyndie        ANESTHESIOLOGY:  MD Angel      FINDINGS:  Successful placement of a single chamber ICD      COMPLICATIONS:  None      RECOMMENDATIONS:  - CXR in the AM  - Ancef 1g q8hrs x2 doses  - Wound check as outpatient in 10-14 days

## 2023-02-27 NOTE — PROGRESS NOTE ADULT - SUBJECTIVE AND OBJECTIVE BOX
[facility] NEPHROLOGY PRACTICE   MD ROSMERY FLOREZ MD KRISTINE SOLTANPOUR, MARY ARRINGTON    TEL:  OFFICE: [phone]  From 5pm-7am Answering Service [phone]    -- RENAL FOLLOW UP NOTE ---Date of Service 02-27-23 @ 11:37    Patient is a 72y old  Male who presents with a chief complaint of Increased SOB, referred for ICD evaluation (27 Feb 2023 09:12)      Patient seen and examined at bedside. No chest pain/sob    VITALS:  T(F): 97.6 (02-27-23 @ 05:00), Max: 98 (02-26-23 @ 14:00)  HR: 71 (02-27-23 @ 07:40)  BP: 135/94 (02-27-23 @ 05:00)  RR: 18 (02-27-23 @ 05:00)  SpO2: 99% (02-27-23 @ 05:00)  Wt(kg): --    02-26 @ 07:01  -  02-27 @ 07:00  --------------------------------------------------------  IN: 450 mL / OUT: 400 mL / NET: 50 mL          PHYSICAL EXAM:  General: NAD  Neck: No JVD  Respiratory: CTAB, no wheezes, rales or rhonchi  Cardiovascular: S1, S2, RRR  Gastrointestinal: BS+, soft, NT/ND  Extremities: No peripheral edema    Hospital Medications:   MEDICATIONS  (STANDING):  aspirin enteric coated 81 milliGRAM(s) Oral daily  atorvastatin 40 milliGRAM(s) Oral at bedtime  carvedilol 25 milliGRAM(s) Oral every 12 hours  clopidogrel Tablet 75 milliGRAM(s) Oral daily  hydrALAZINE 25 milliGRAM(s) Oral every 8 hours  levETIRAcetam 500 milliGRAM(s) Oral two times a day  sacubitril 24 mG/valsartan 26 mG 1 Tablet(s) Oral two times a day      LABS:  02-27    135  |  103  |  24<H>  ----------------------------<  104<H>  4.2   |  23  |  1.58<H>    Ca    9.4      27 Feb 2023 06:00  Phos  3.1     02-27  Mg     2.00     02-27      Creatinine Trend: 1.58 <--, 1.46 <--, 1.46 <--, 1.63 <--, 1.53 <--, 1.53 <--    Phosphorus Level, Serum: 3.1 mg/dL (02-27 @ 06:00)                              14.4   5.09  )-----------( 211      ( 27 Feb 2023 06:00 )             45.0     Urine Studies:      HbA1c 5.6      [02-29-20 @ 05:30]  TSH 0.79      [02-23-23 @ 05:35]  Lipid: chol 72, , HDL 34, LDL --      [02-23-23 @ 05:35]        RADIOLOGY & ADDITIONAL STUDIES:

## 2023-02-28 ENCOUNTER — TRANSCRIPTION ENCOUNTER (OUTPATIENT)
Age: 73
End: 2023-02-28

## 2023-02-28 DIAGNOSIS — R07.89 OTHER CHEST PAIN: ICD-10-CM

## 2023-02-28 DIAGNOSIS — N18.30 CHRONIC KIDNEY DISEASE, STAGE 3 UNSPECIFIED: ICD-10-CM

## 2023-02-28 LAB
ANION GAP SERPL CALC-SCNC: 8 MMOL/L — SIGNIFICANT CHANGE UP (ref 7–14)
BUN SERPL-MCNC: 23 MG/DL — SIGNIFICANT CHANGE UP (ref 7–23)
CALCIUM SERPL-MCNC: 9 MG/DL — SIGNIFICANT CHANGE UP (ref 8.4–10.5)
CHLORIDE SERPL-SCNC: 104 MMOL/L — SIGNIFICANT CHANGE UP (ref 98–107)
CO2 SERPL-SCNC: 20 MMOL/L — LOW (ref 22–31)
CREAT SERPL-MCNC: 1.7 MG/DL — HIGH (ref 0.5–1.3)
EGFR: 42 ML/MIN/1.73M2 — LOW
GLUCOSE SERPL-MCNC: 91 MG/DL — SIGNIFICANT CHANGE UP (ref 70–99)
HCT VFR BLD CALC: 43.6 % — SIGNIFICANT CHANGE UP (ref 39–50)
HGB BLD-MCNC: 14 G/DL — SIGNIFICANT CHANGE UP (ref 13–17)
MAGNESIUM SERPL-MCNC: 1.9 MG/DL — SIGNIFICANT CHANGE UP (ref 1.6–2.6)
MCHC RBC-ENTMCNC: 28.4 PG — SIGNIFICANT CHANGE UP (ref 27–34)
MCHC RBC-ENTMCNC: 32.1 GM/DL — SIGNIFICANT CHANGE UP (ref 32–36)
MCV RBC AUTO: 88.4 FL — SIGNIFICANT CHANGE UP (ref 80–100)
NRBC # BLD: 0 /100 WBCS — SIGNIFICANT CHANGE UP (ref 0–0)
NRBC # FLD: 0 K/UL — SIGNIFICANT CHANGE UP (ref 0–0)
PHOSPHATE SERPL-MCNC: 3.6 MG/DL — SIGNIFICANT CHANGE UP (ref 2.5–4.5)
PLATELET # BLD AUTO: 194 K/UL — SIGNIFICANT CHANGE UP (ref 150–400)
POTASSIUM SERPL-MCNC: 4.4 MMOL/L — SIGNIFICANT CHANGE UP (ref 3.5–5.3)
POTASSIUM SERPL-SCNC: 4.4 MMOL/L — SIGNIFICANT CHANGE UP (ref 3.5–5.3)
RBC # BLD: 4.93 M/UL — SIGNIFICANT CHANGE UP (ref 4.2–5.8)
RBC # FLD: 13.5 % — SIGNIFICANT CHANGE UP (ref 10.3–14.5)
SODIUM SERPL-SCNC: 132 MMOL/L — LOW (ref 135–145)
WBC # BLD: 6.62 K/UL — SIGNIFICANT CHANGE UP (ref 3.8–10.5)
WBC # FLD AUTO: 6.62 K/UL — SIGNIFICANT CHANGE UP (ref 3.8–10.5)

## 2023-02-28 PROCEDURE — 99232 SBSQ HOSP IP/OBS MODERATE 35: CPT

## 2023-02-28 PROCEDURE — 71045 X-RAY EXAM CHEST 1 VIEW: CPT | Mod: 26

## 2023-02-28 RX ORDER — CARVEDILOL PHOSPHATE 80 MG/1
0 CAPSULE, EXTENDED RELEASE ORAL
Qty: 0 | Refills: 0 | DISCHARGE

## 2023-02-28 RX ORDER — OXYCODONE HYDROCHLORIDE 5 MG/1
5 TABLET ORAL ONCE
Refills: 0 | Status: DISCONTINUED | OUTPATIENT
Start: 2023-02-28 | End: 2023-02-28

## 2023-02-28 RX ORDER — ACETAMINOPHEN 500 MG
2 TABLET ORAL
Qty: 0 | Refills: 0 | DISCHARGE
Start: 2023-02-28

## 2023-02-28 RX ORDER — METOPROLOL TARTRATE 50 MG
1 TABLET ORAL
Qty: 0 | Refills: 0 | DISCHARGE

## 2023-02-28 RX ORDER — LANOLIN ALCOHOL/MO/W.PET/CERES
1 CREAM (GRAM) TOPICAL
Qty: 0 | Refills: 0 | DISCHARGE
Start: 2023-02-28

## 2023-02-28 RX ORDER — CARVEDILOL PHOSPHATE 80 MG/1
1 CAPSULE, EXTENDED RELEASE ORAL
Qty: 60 | Refills: 0
Start: 2023-02-28 | End: 2023-03-29

## 2023-02-28 RX ORDER — SACUBITRIL AND VALSARTAN 24; 26 MG/1; MG/1
1 TABLET, FILM COATED ORAL
Qty: 60 | Refills: 0
Start: 2023-02-28 | End: 2023-03-29

## 2023-02-28 RX ORDER — ACETAMINOPHEN 500 MG
975 TABLET ORAL ONCE
Refills: 0 | Status: COMPLETED | OUTPATIENT
Start: 2023-02-28 | End: 2023-02-28

## 2023-02-28 RX ADMIN — CARVEDILOL PHOSPHATE 25 MILLIGRAM(S): 80 CAPSULE, EXTENDED RELEASE ORAL at 05:16

## 2023-02-28 RX ADMIN — OXYCODONE HYDROCHLORIDE 5 MILLIGRAM(S): 5 TABLET ORAL at 15:17

## 2023-02-28 RX ADMIN — Medication 100 MILLIGRAM(S): at 06:38

## 2023-02-28 RX ADMIN — Medication 25 MILLIGRAM(S): at 05:16

## 2023-02-28 RX ADMIN — CLOPIDOGREL BISULFATE 75 MILLIGRAM(S): 75 TABLET, FILM COATED ORAL at 11:29

## 2023-02-28 RX ADMIN — OXYCODONE HYDROCHLORIDE 5 MILLIGRAM(S): 5 TABLET ORAL at 16:00

## 2023-02-28 RX ADMIN — Medication 81 MILLIGRAM(S): at 11:29

## 2023-02-28 RX ADMIN — Medication 975 MILLIGRAM(S): at 04:54

## 2023-02-28 RX ADMIN — Medication 975 MILLIGRAM(S): at 03:54

## 2023-02-28 RX ADMIN — SACUBITRIL AND VALSARTAN 1 TABLET(S): 24; 26 TABLET, FILM COATED ORAL at 05:16

## 2023-02-28 RX ADMIN — LEVETIRACETAM 500 MILLIGRAM(S): 250 TABLET, FILM COATED ORAL at 05:16

## 2023-02-28 NOTE — DISCHARGE NOTE NURSING/CASE MANAGEMENT/SOCIAL WORK - NSDCFUADDAPPT_GEN_ALL_CORE_FT
Please follow up with your primary care provider in 1-2 weeks following discharge from the hospital for continued monitoring and management.    You have an appointment with heart failure at 1:30PM on 3/15/23 with Karlene. Please continue your current medication regimen until this time.     You have an appointment with EP (Cardiology team that placed your device) at 10:40AM on 3/8/23. Please refer to discharge instructions from team (listed below).

## 2023-02-28 NOTE — PROGRESS NOTE ADULT - ASSESSMENT
72 year old Male with h/o HTN, CKD III (baseline Cr 1.6), HFrEF/NICM (EF 30-35%, LVEDD 6.6 cm), recent seizure/cardiac arrest [facility] (12/[facility]) who was referred to ED for expedited evaluation with repeat TTE and consideration of ICD implant. Patient got ICD implanted on 2/27/23, having some tenderness to palpation at site today. JVP appears low, chest CTA b/l, no LE edema, abdomen soft, non tender.     Appears slightly hypovolemic today.

## 2023-02-28 NOTE — PROGRESS NOTE ADULT - PROBLEM SELECTOR PROBLEM 3
Encounter for deep vein thrombosis (DVT) prophylaxis
Chronic systolic congestive heart failure

## 2023-02-28 NOTE — PROGRESS NOTE ADULT - ASSESSMENT
73yo man with MHx of NICM, severe sys HF (LVEF 35-40% with GDMT from 25-30% ), severe functional MR, HTN, and CKD III (baseline Cr 1.6),  seizure/cardiac arrest, prolonged hospitalization at [facility] 12/19/2022 was d/c to rehab on 1/6/2023; course was c/b a coma (per [employer] review) Per the patient, he was at [facility] since 1/6/2023 and was discharged home on 2/20/2023. He was told by the rehab to make an appointment with his HF doctor. Dr. Charles. Patient had an appointment with him today and was recommended to come to [facility] for an ICD evaluation    CKD [id number]  His baseline ~ 1.5-1.6 and follows up with Dr. Gutiérrez.   His renal function is stable  stable from renal for dc. pt advised to follow up with dr. gutiérrez in 1-2wks post hd  - monitor BMP.  - avoid nephrotoxic agents    HFrEF  EF of 32% on Echo   s/p ICD 2/27  f/u team    HTN  controlled  monitor

## 2023-02-28 NOTE — PROGRESS NOTE ADULT - PROBLEM SELECTOR PLAN 2
Moderate global  left ventricular systolic dysfunction per TTE 2021  -appreciate EP recs, for implantation of ICD today  -Telemetry  -Daily weight, strict I/Os, Fluid restriction 1.5L/day   -c/w Carvedilol BID, entresto, lasix dc/ed  -c/w DAPT, Statin   -Jardiance for CHF held while inpt
Moderate global  left ventricular systolic dysfunction per TTE 2021  -appreciate EP recs, for implantation of ICD 2/27  -Telemetry  -Daily weight, strict I/Os, Fluid restriction 1.5L/day   -c/w Carvedilol BID, entresto, lasix dc/ed  -c/w DAPT, Statin   -Jardiance for CHF held while inpt
Moderate global  left ventricular systolic dysfunction per TTE 2021  -appreciate EP recs, for implantation of ICD 2/27  -Telemetry  -Daily weight, strict I/Os, Fluid restriction 1.5L/day   -c/w Carvedilol BID, entresto, lasix dc/ed  -c/w DAPT, Statin   -Jardiance for CHF held while inpt
Moderate global  left ventricular systolic dysfunction per TTE 2021  -appreciate EP recs, possible implantation of ICD   -Telemetry  -Daily weight, strict I/Os, Fluid restriction 1.5L/day   -c/w Lasix PO pending CHF team recs   -c/w Carvedilol BID   -Clarify with CHF team if also to be on Metoprolol ER (held at this time)  -c/w DAPT, Statin   -Jardiance for CHF held while inpt
Multifactorial due chronic sys CHF and to recent hospitalization deconditioning (s/p rehab); Ambulates with a cane;  Pro-BNP not suggestive of acute fluid overload; Minimal trace LE edema;  -CXR: clear lungs   -ProBNP 229, Trop 18-->16, low suspicion of ACS  -TTE: LVEF 32%  -appreciate CHF team recs: Continue to hold diuretics. He was on Lasix 40 mg po daily at home.   Continue Entresto 24/26 mg po BID- he was on this at home.   Continue Coreg 25 mg po BID.   Continue hydral 25 mg po TID.
Moderate global  left ventricular systolic dysfunction per TTE 2021  -appreciate EP recs, for implantation of ICD 2/27  -Telemetry  -Daily weight, strict I/Os, Fluid restriction 1.5L/day   -c/w Carvedilol BID, entresto, lasix dc/ed  -c/w DAPT, Statin   -Jardiance for CHF held while inpt

## 2023-02-28 NOTE — DISCHARGE NOTE NURSING/CASE MANAGEMENT/SOCIAL WORK - NSTRANSFERBELONGINGSDISPO_GEN_A_NUR
not applicable Mastoid Interpolation Flap Text: A decision was made to reconstruct the defect utilizing an interpolation axial flap and a staged reconstruction.  A telfa template was made of the defect.  This telfa template was then used to outline the mastoid interpolation flap.  The donor area for the pedicle flap was then injected with anesthesia.  The flap was excised through the skin and subcutaneous tissue down to the layer of the underlying musculature.  The pedicle flap was carefully excised within this deep plane to maintain its blood supply.  The edges of the donor site were undermined.   The donor site was closed in a primary fashion.  The pedicle was then rotated into position and sutured.  Once the tube was sutured into place, adequate blood supply was confirmed with blanching and refill.  The pedicle was then wrapped with xeroform gauze and dressed appropriately with a telfa and gauze bandage to ensure continued blood supply and protect the attached pedicle.

## 2023-02-28 NOTE — PROGRESS NOTE ADULT - ASSESSMENT
71yo man with MHx of NICM, severe sys HF (LVEF 35-40% with GDMT from 25-30%), severe functional MR, HTN, and CKD III (baseline Cr 1.6),  seizure/cardiac arrest, recent prolonged hospitalization at [facility] c/b a coma a/w BARROW, s/p ICD placement on 2/27.

## 2023-02-28 NOTE — PROGRESS NOTE ADULT - SUBJECTIVE AND OBJECTIVE BOX
Patient is seen nad examined. Patient denies any chest pain, SOB, palpitations or dizziness. He is s/p ICD placement yesterday      PAST MEDICAL & SURGICAL HISTORY:  HTN (hypertension)    Chronic kidney disease (CKD)    No significant past surgical history        MEDICATIONS  (STANDING):  aspirin enteric coated 81 milliGRAM(s) Oral daily  atorvastatin 40 milliGRAM(s) Oral at bedtime  carvedilol 25 milliGRAM(s) Oral every 12 hours  clopidogrel Tablet 75 milliGRAM(s) Oral daily  hydrALAZINE 25 milliGRAM(s) Oral every 8 hours  levETIRAcetam 500 milliGRAM(s) Oral two times a day  sacubitril 24 mG/valsartan 26 mG 1 Tablet(s) Oral two times a day    MEDICATIONS  (PRN):  acetaminophen     Tablet .. 650 milliGRAM(s) Oral every 6 hours PRN Temp greater or equal to 38C (100.4F), Mild Pain (1 - 3)  melatonin 3 milliGRAM(s) Oral at bedtime PRN Insomnia    Vital Signs Last 24 Hrs  T(C): 36.6 (28 Feb 2023 05:14), Max: 36.7 (27 Feb 2023 22:43)  T(F): 97.8 (28 Feb 2023 05:14), Max: 98 (27 Feb 2023 22:43)  HR: 76 (28 Feb 2023 05:14) (65 - 79)  BP: 105/62 (28 Feb 2023 05:14) (100/63 - 116/74)  BP(mean): --  RR: 18 (28 Feb 2023 05:14) (18 - 18)  SpO2: 98% (28 Feb 2023 05:14) (98% - 100%)    Parameters below as of 28 Feb 2023 05:14  Patient On (Oxygen Delivery Method): room air      INTERPRETATION OF TELEMETRY: Sinus rhythm with HR 70s; PVCs      LABS:                        14.0   6.62  )-----------( 194      ( 28 Feb 2023 04:02 )             43.6     02-28    132<L>  |  104  |  23  ----------------------------<  91  4.4   |  20<L>  |  1.70<H>    Ca    9.0      28 Feb 2023 04:02  Phos  3.6     02-28  Mg     1.90     02-28          PT/INR - ( 27 Feb 2023 06:00 )   PT: 12.9 sec;   INR: 1.11 ratio         PTT - ( 27 Feb 2023 06:00 )  PTT:30.0 sec    I&O's Summary    27 Feb 2023 07:01  -  28 Feb 2023 07:00  --------------------------------------------------------  IN: 100 mL / OUT: 0 mL / NET: 100 mL          PHYSICAL EXAM:    GENERAL: In no apparent distress, well nourished, and hydrated.  HEART: Regular rate and rhythm; No murmurs, rubs, or gallops.  PULMONARY: Clear to auscultation and percussion.  No rales, wheezing, or rhonchi bilaterally.  ABDOMEN: Soft, Nontender, Nondistended; Bowel sounds present  EXTREMITIES:  2+ Peripheral Pulses, No clubbing, cyanosis, or edema

## 2023-02-28 NOTE — PROGRESS NOTE ADULT - PROBLEM SELECTOR PROBLEM 2
Chronic systolic congestive heart failure
BARROW (dyspnea on exertion)
Chronic systolic congestive heart failure

## 2023-02-28 NOTE — PHARMACOTHERAPY INTERVENTION NOTE - COMMENTS
Patient counseled on heart failure medication indications, administration, and side effects. Also discussed fluid and salt restrictions, and maintaining a log of daily weights. He does not have a scale at home so one was provided to him. Also discussed warning signs of fluid overload (SOB, orthopnea, BARROW, edema, etc.). Patient verbalized understanding. Informed patient that medication list may change prior to discharge. Patient provided with educational booklet.    Mary Santos, ObdulioD, Clinical Pharmacy Specialist, [id number]

## 2023-02-28 NOTE — PROGRESS NOTE ADULT - ASSESSMENT
This is 71 year old man with pmhx of NICM, HF (LVEF 35-40% with GDMT from 25-30% ) with severe functional MR, uncontrolled HTN, CKD III (baseline Cr 1.6), and seizure/cardiac arrest, hospitalized at [facility] 2/19/2022 to 1/6/2023 with course c/b a coma x 3 days (as per the niMaddy carlos) sent to [facility] for ICD evaluation for secondary prevention.  Of note, patient was seen by Dr. Duncan in 9/21/2020 who recommended ICD for primary prevention of sudden cardiac death given his severe LV dysfunction despite being on GDMT for more than 3 months. S/P ICD placement yesterday.   ICD site with dressing. It was clean, dry, and intact. No bleeding, drainage hematoma, or ecchymosis appreciated.    s/p ICD  Post-op ICD instructions have been verbally explained and given to the patient. Patient was also given booklet and ID card. Patient expressed understanding and all questions were answered. A copy of the instruction is located in the patients chart   Patient is schedule for an appointment on 3/8/23 @ 10:40am  No scrubbing the incision site   No lotion, ointment, powder or direct sunlight to the incision site for 2 weeks   No lifting more than 5lb or strenuous activity such as golfing, tennis or swimming for 6-8 weeks  No swimming pool, [employer] for 6-8 weeks.   You should carry your ID card for metal detectors   Remove bandage after 24 hours  Patient can shower 24 hours after procedure. Pat the area dry  Keep Cellular phone 6 inches away  from the device  Patient was instructed to call [phone] if the following occurs:      - fever with temperature > 101F      - swelling, drainage or bleeding at the site incision   Monitor electrolytes and replete K to 4 and Mg to 2. Creatinine stable.   May d/c home from EP standpoint. CXR and device interrogation normal   This is 71 year old man with pmhx of NICM, HF (LVEF 35-40% with GDMT from 25-30% ) with severe functional MR, uncontrolled HTN, CKD III (baseline Cr 1.6), and seizure/cardiac arrest, hospitalized at [facility] 2/19/2022 to 1/6/2023 with course c/b a coma x 3 days (as per the niMaddy carlos) sent to [facility] for ICD evaluation for secondary prevention.  Of note, patient was seen by Dr. Duncan in 9/21/2020 who recommended ICD for primary prevention of sudden cardiac death given his severe LV dysfunction despite being on GDMT for more than 3 months. S/P ICD placement yesterday.   ICD site with dressing. It was clean, dry, and intact. No bleeding, drainage hematoma, or ecchymosis appreciated.    s/p ICD  Post-op ICD instructions have been verbally explained and given to the patient. Patient was also given booklet and ID card. Patient expressed understanding and all questions were answered. A copy of the instruction is located in the patients chart   Patient is schedule for an appointment on 3/8/23 @ 10:40am  No scrubbing the incision site   No lotion, ointment, powder or direct sunlight to the incision site for 2 weeks   No lifting more than 5lb or strenuous activity such as golfing, tennis or swimming for 6-8 weeks  No swimming pool, [employer] for 6-8 weeks.   You should carry your ID card for metal detectors   Remove bandage after 24 hours  Patient can shower 24 hours after procedure. Pat the area dry  Keep Cellular phone 6 inches away  from the device  Patient was instructed to call [phone] if the following occurs:      - fever with temperature > 101F      - swelling, drainage or bleeding at the site incision   Monitor electrolytes and replete K to 4 and Mg to 2. Creatinine stable.   May d/c home from EP standpoint. CXR and device interrogation normal  EP will sign off and please call back with any questions

## 2023-02-28 NOTE — PROGRESS NOTE ADULT - PROBLEM SELECTOR PLAN 3
Hold Heparin pending ICD  SCDs for now
Hold Heparin pending formal eval for ICD implantation   SCDs for now
Hold Heparin pending ICD  SCDs for now
Hold Heparin pending ICD  SCDs for now
Moderate global  left ventricular systolic dysfunction per TTE 2021  -s/p ICD placement on 2/27  -Telemetry  -Daily weight, strict I/Os, Fluid restriction 1.5L/day   -c/w Carvedilol BID, entresto, lasix dc/ed  -c/w DAPT, Statin   -Jardiance for CHF held while inpt
Hold Heparin pending formal eval for ICD implantation   SCDs for now

## 2023-02-28 NOTE — PROGRESS NOTE ADULT - SUBJECTIVE AND OBJECTIVE BOX
Medications:  acetaminophen     Tablet .. 650 milliGRAM(s) Oral every 6 hours PRN  aspirin enteric coated 81 milliGRAM(s) Oral daily  atorvastatin 40 milliGRAM(s) Oral at bedtime  carvedilol 25 milliGRAM(s) Oral every 12 hours  clopidogrel Tablet 75 milliGRAM(s) Oral daily  hydrALAZINE 25 milliGRAM(s) Oral every 8 hours  levETIRAcetam 500 milliGRAM(s) Oral two times a day  melatonin 3 milliGRAM(s) Oral at bedtime PRN  sacubitril 24 mG/valsartan 26 mG 1 Tablet(s) Oral two times a day      Vitals:  Vital Signs Last 24 Hrs  T(C): 36.3 (2023 11:25), Max: 36.7 (2023 22:43)  T(F): 97.4 (2023 11:25), Max: 98 (2023 22:43)  HR: 74 (2023 11:25) (65 - 79)  BP: 106/76 (2023 11:25) (100/63 - 116/74)  BP(mean): --  RR: 18 (2023 11:25) (18 - 18)  SpO2: 99% (2023 11:25) (98% - 100%)    Parameters below as of 2023 11:25  Patient On (Oxygen Delivery Method): room air        Daily     Daily Weight in k.6 (2023 05:14)    I&O's Detail    2023 07:01  -  2023 07:00  --------------------------------------------------------  IN:    Oral Fluid: 100 mL  Total IN: 100 mL    OUT:  Total OUT: 0 mL    Total NET: 100 mL      2023 07:01  -  2023 12:43  --------------------------------------------------------  IN:    Oral Fluid: 220 mL  Total IN: 220 mL    OUT:  Total OUT: 0 mL    Total NET: 220 mL          Physical Exam:     General: No distress. Comfortable.  HEENT: EOM intact.  Neck: Neck supple. JVP not elevated. No masses  Chest: Clear to auscultation bilaterally  CV: Normal S1 and S2. No murmurs, rub, or gallops. Radial pulses normal.  Abdomen: Soft, non-distended, non-tender  Skin: No rashes or skin breakdown  Neurology: Alert and oriented times three. Sensation intact  Psych: Affect normal    Labs:                        14.0   6.62  )-----------( 194      ( 2023 04:02 )             43.6     -    132<L>  |  104  |  23  ----------------------------<  91  4.4   |  20<L>  |  1.70<H>    Ca    9.0      2023 04:02  Phos  3.6       Mg     1.90     -      PT/INR - ( 2023 06:00 )   PT: 12.9 sec;   INR: 1.11 ratio         PTT - ( 2023 06:00 )  PTT:30.0 sec       Patient seen and examined. He is having tenderness over the ICD site- getting pain meds.   No SOB at rest, orthopnea, PND, BARROW, CP, palpitations.         Medications:  acetaminophen     Tablet .. 650 milliGRAM(s) Oral every 6 hours PRN  aspirin enteric coated 81 milliGRAM(s) Oral daily  atorvastatin 40 milliGRAM(s) Oral at bedtime  carvedilol 25 milliGRAM(s) Oral every 12 hours  clopidogrel Tablet 75 milliGRAM(s) Oral daily  hydrALAZINE 25 milliGRAM(s) Oral every 8 hours  levETIRAcetam 500 milliGRAM(s) Oral two times a day  melatonin 3 milliGRAM(s) Oral at bedtime PRN  sacubitril 24 mG/valsartan 26 mG 1 Tablet(s) Oral two times a day      Vitals:  Vital Signs Last 24 Hrs  T(C): 36.3 (2023 11:25), Max: 36.7 (2023 22:43)  T(F): 97.4 (2023 11:25), Max: 98 (2023 22:43)  HR: 74 (2023 11:25) (65 - 79)  BP: 106/76 (2023 11:25) (100/63 - 116/74)  BP(mean): --  RR: 18 (2023 11:25) (18 - 18)  SpO2: 99% (2023 11:25) (98% - 100%)    Parameters below as of 2023 11:25  Patient On (Oxygen Delivery Method): room air        Daily     Daily Weight in k.6 (2023 05:14)    I&O's Detail    2023 07:01  -  2023 07:00  --------------------------------------------------------  IN:    Oral Fluid: 100 mL  Total IN: 100 mL    OUT:  Total OUT: 0 mL    Total NET: 100 mL      2023 07:01  -  2023 12:43  --------------------------------------------------------  IN:    Oral Fluid: 220 mL  Total IN: 220 mL    OUT:  Total OUT: 0 mL    Total NET: 220 mL          Physical Exam:     General: pain at ICD site  HEENT: EOM intact.  Neck: Neck supple. JVP low.  No masses  Chest: Clear to auscultation bilaterally. Tender to palpation over ICD site. site CDI  CV: Normal S1 and S2. No murmurs, rub, or gallops. Radial pulses normal. No LE edema and is warm b/l.   Abdomen: Soft, non-distended, non-tender  Skin: No rashes or skin breakdown  Neurology: Alert and oriented times three. Sensation intact  Psych: Affect normal    Labs:                        14.0   6.62  )-----------( 194      ( 2023 04:02 )             43.6     02-28    132<L>  |  104  |  23  ----------------------------<  91  4.4   |  20<L>  |  1.70<H>    Ca    9.0      2023 04:02  Phos  3.6     02-28  Mg     1.90     02-28      PT/INR - ( 2023 06:00 )   PT: 12.9 sec;   INR: 1.11 ratio         PTT - ( 2023 06:00 )  PTT:30.0 sec

## 2023-02-28 NOTE — PROGRESS NOTE ADULT - SUBJECTIVE AND OBJECTIVE BOX
[facility] NEPHROLOGY PRACTICE   MD ROSMERY FLOREZ MD KRISTINE SOLTANPOUR, MARY ARRINGTON    TEL:  OFFICE: [phone]  From 5pm-7am Answering Service [phone]    -- RENAL FOLLOW UP NOTE ---Date of Service 02-28-23 @ 13:14    Patient is a 72y old  Male who presents with a chief complaint of Increased SOB, referred for ICD evaluation (28 Feb 2023 12:43)      Patient seen and examined at bedside. No chest pain/sob    VITALS:  T(F): 97.4 (02-28-23 @ 11:25), Max: 98 (02-27-23 @ 22:43)  HR: 74 (02-28-23 @ 11:25)  BP: 106/76 (02-28-23 @ 11:25)  RR: 18 (02-28-23 @ 11:25)  SpO2: 99% (02-28-23 @ 11:25)  Wt(kg): --    02-27 @ 07:01  - 02-28 @ 07:00  --------------------------------------------------------  IN: 100 mL / OUT: 0 mL / NET: 100 mL    02-28 @ 07:01  -  02-28 @ 13:14  --------------------------------------------------------  IN: 220 mL / OUT: 0 mL / NET: 220 mL          PHYSICAL EXAM:  General: NAD  Neck: No JVD  Respiratory: CTAB, no wheezes, rales or rhonchi  Cardiovascular: S1, S2, RRR  Gastrointestinal: BS+, soft, NT/ND  Extremities: No peripheral edema    Hospital Medications:   MEDICATIONS  (STANDING):  aspirin enteric coated 81 milliGRAM(s) Oral daily  atorvastatin 40 milliGRAM(s) Oral at bedtime  carvedilol 25 milliGRAM(s) Oral every 12 hours  clopidogrel Tablet 75 milliGRAM(s) Oral daily  hydrALAZINE 25 milliGRAM(s) Oral every 8 hours  levETIRAcetam 500 milliGRAM(s) Oral two times a day  sacubitril 24 mG/valsartan 26 mG 1 Tablet(s) Oral two times a day      LABS:  02-28    132<L>  |  104  |  23  ----------------------------<  91  4.4   |  20<L>  |  1.70<H>    Ca    9.0      28 Feb 2023 04:02  Phos  3.6     02-28  Mg     1.90     02-28      Creatinine Trend: 1.70 <--, 1.58 <--, 1.46 <--, 1.46 <--, 1.63 <--, 1.53 <--, 1.53 <--    Phosphorus Level, Serum: 3.6 mg/dL (02-28 @ 04:02)                              14.0   6.62  )-----------( 194      ( 28 Feb 2023 04:02 )             43.6     Urine Studies:      HbA1c 5.6      [02-29-20 @ 05:30]  TSH 0.79      [02-23-23 @ 05:35]  Lipid: chol 72, , HDL 34, LDL --      [02-23-23 @ 05:35]        RADIOLOGY & ADDITIONAL STUDIES:

## 2023-02-28 NOTE — PROGRESS NOTE ADULT - NS ATTEND AMEND GEN_ALL_CORE FT
71 year old man with pmhx of NICM, HF (LVEF 35-40% with GDMT from 25-30% ) with severe functional MR, uncontrolled HTN, and CKD III (baseline Cr 1.6), and seizure/cardiac arrest and was hospitalized at [facility] 2/19/2022 to 1/6/2023 and course c/b a coma for 3 days per the niece. Seen by HF today and sent to ED for further management.     #HFrEF  #Cardiac arrest  - Patient meets criteria for a secondary prevention ICD tomorrow  - Please keep NPO  - Hold all blood thinners  - Discussed plan with Dr Charles from heart failure.
71 year old man with pmhx of NICM, HF (LVEF 35-40% with GDMT from 25-30% ) with severe functional MR, uncontrolled HTN, and CKD III (baseline Cr 1.6), and seizure/cardiac arrest and was hospitalized at [facility] 2/19/2022 to 1/6/2023 and course c/b a coma for 3 days per the niece. Seen by HF today and sent to ED for further management.     #HFrEF  #Cardiac arrest  - Patient meets criteria for a secondary prevention ICD.   - Was planned for today, but deferred until Monday  - Keep NPO after midnight on Sunday night  - Discussed plan with Dr Charles from heart failure
Continue current regimen.  Will restart home diuretic dose harsha d/c.

## 2023-02-28 NOTE — PROGRESS NOTE ADULT - PROBLEM SELECTOR PLAN 1
Multifactorial due chronic sys CHF and to recent hospitalization deconditioning (s/p rehab); Ambulates with a cane;  Pro-BNP not suggestive of acute fluid overload; Minimal trace LE edema;  -CXR: clear lungs   -ProBNP 229, Trop 18-->16, low suspicion of ACS  -TTE: LVEF 32%  -appreciate CHF team recs  -VS q6h  -Plan as below   -PT eval   -Fall, aspiration precautions
Multifactorial due chronic sys CHF and to recent hospitalization deconditioning (s/p rehab); Ambulates with a cane;  Pro-BNP not suggestive of acute fluid overload; Minimal trace LE edema;  -CXR: clear lungs   -ProBNP 229, Trop 18-->16, low suspicion of ACS  -TTE: LVEF 32%  -f/u CHF team recs  -VS q6h  -Plan as below   -PT eval   -Fall, aspiration precautions
complaining of chest wall tenderness at site of ICD placement  Will trail Oxycodone 5mg ONCE- if no improvement will do trop and EKG, however likely due to surgical site pain   If oxy 5mg helps- can give oxy 5mg Q8H prn x 3 days  monitor for signs of fever, swelling or drainage at surgical site
Multifactorial due chronic sys CHF and to recent hospitalization deconditioning (s/p rehab); Ambulates with a cane;  Pro-BNP not suggestive of acute fluid overload; Minimal trace LE edema;  -CXR: clear lungs   -ProBNP 229, Trop 18-->16, low suspicion of ACS  -TTE: LVEF 32%  -appreciate CHF team recs  -VS q6h  -Plan as below   -PT eval   -Fall, aspiration precautions
Appears slightly hypovolemic today with elevation in renal function labs.   Continue to hold diuretics. He was on Lasix 40 mg po daily at home.   Continue Entresto 24/26 mg po BID- he was on this at home.   Continue Coreg 25 mg po BID.   Continue hydral 25 mg po TID.   Patient is requesting to stay one more day because he is having a lot of tenderness at site.   Ok to stay and monitor renal function as well. Encourage oral hydration.   Will decide on home oral diuretic regimen upon d/c.   Will give appointment to follow up with Dr. Charles upon d/c.
Multifactorial due chronic sys CHF and to recent hospitalization deconditioning (s/p rehab); Ambulates with a cane;  Pro-BNP not suggestive of acute fluid overload; Minimal trace LE edema;  -CXR: clear lungs   -ProBNP 229, Trop 18-->16, low suspicion of ACS  -TTE: LVEF 32%  -appreciate CHF team recs  -VS q6h  -Plan as below   -PT eval   -Fall, aspiration precautions
Multifactorial due chronic sys CHF and to recent hospitalization deconditioning (s/p rehab); Ambulates with a cane;  Pro-BNP not suggestive of acute fluid overload; Minimal trace LE edema;  -CXR: clear lungs   -ProBNP 229, Trop 18-->16, low suspicion of ACS  -TTE: LVEF 32%  -appreciate CHF team recs  -VS q6h  -Plan as below   -PT eval   -Fall, aspiration precautions

## 2023-02-28 NOTE — PROGRESS NOTE ADULT - PROBLEM SELECTOR PROBLEM 1
BARROW (dyspnea on exertion)
Chest wall tenderness
BARROW (dyspnea on exertion)
Chronic systolic congestive heart failure
BARROW (dyspnea on exertion)

## 2023-02-28 NOTE — DISCHARGE NOTE NURSING/CASE MANAGEMENT/SOCIAL WORK - PATIENT PORTAL LINK FT
You can access the FollowMyHealth Patient Portal offered by [facility] [place] by registering at the following website: [email or web address]. By joining [facility]’s FollowMyHealth portal, you will also be able to view your health information using other applications (apps) compatible with our system.

## 2023-02-28 NOTE — DISCHARGE NOTE NURSING/CASE MANAGEMENT/SOCIAL WORK - NSDCPEFALRISK_GEN_ALL_CORE
For information on Fall & Injury Prevention, visit: [email or web address] OR  [email or web address] OR  https://www.cdc.gov/steadi/patient.html
No

## 2023-02-28 NOTE — DISCHARGE NOTE NURSING/CASE MANAGEMENT/SOCIAL WORK - NSDCVIVACCINE_GEN_ALL_CORE_FT
influenza, injectable, quadrivalent, preservative free; 04-Mar-2020 13:42; Teri Westbrook (LEONEL); [employer]; [id number] (Exp. Date: 30-Jun-2020); IntraMuscular; Deltoid Left.; 0.5 milliLiter(s); VIS (VIS Published: 15-Aug-2019, VIS Presented: 04-Mar-2020);

## 2023-02-28 NOTE — PROGRESS NOTE ADULT - SUBJECTIVE AND OBJECTIVE BOX
[facility] Division of Hospital Medicine  Hodan Reis MD  Hospitalist   Pager [phone]  Avaliable via MS teams     SUBJECTIVE / OVERNIGHT EVENTS: Pt seen and examined. Pt s/p ICD placement on 2/27. Pt states he has pain at surgical site, worse with movement or touching site. Denies midsternal chest pain, radiation.     ADDITIONAL REVIEW OF SYSTEMS:    MEDICATIONS  (STANDING):  aspirin enteric coated 81 milliGRAM(s) Oral daily  atorvastatin 40 milliGRAM(s) Oral at bedtime  carvedilol 25 milliGRAM(s) Oral every 12 hours  clopidogrel Tablet 75 milliGRAM(s) Oral daily  hydrALAZINE 25 milliGRAM(s) Oral every 8 hours  levETIRAcetam 500 milliGRAM(s) Oral two times a day  oxyCODONE    IR 5 milliGRAM(s) Oral once  sacubitril 24 mG/valsartan 26 mG 1 Tablet(s) Oral two times a day    MEDICATIONS  (PRN):  acetaminophen     Tablet .. 650 milliGRAM(s) Oral every 6 hours PRN Temp greater or equal to 38C (100.4F), Mild Pain (1 - 3)  melatonin 3 milliGRAM(s) Oral at bedtime PRN Insomnia      I&O's Summary    27 Feb 2023 07:01  -  28 Feb 2023 07:00  --------------------------------------------------------  IN: 100 mL / OUT: 0 mL / NET: 100 mL    28 Feb 2023 07:01  -  28 Feb 2023 15:14  --------------------------------------------------------  IN: 440 mL / OUT: 0 mL / NET: 440 mL        PHYSICAL EXAM:  Vital Signs Last 24 Hrs  T(C): 36.2 (28 Feb 2023 14:17), Max: 36.7 (27 Feb 2023 22:43)  T(F): 97.2 (28 Feb 2023 14:17), Max: 98 (27 Feb 2023 22:43)  HR: 75 (28 Feb 2023 14:17) (65 - 79)  BP: 96/62 (28 Feb 2023 14:17) (96/62 - 108/69)  BP(mean): --  RR: 18 (28 Feb 2023 14:17) (18 - 18)  SpO2: 100% (28 Feb 2023 14:17) (98% - 100%)    Parameters below as of 28 Feb 2023 14:17  Patient On (Oxygen Delivery Method): room air      CONSTITUTIONAL: NAD, well-developed, well-groomed  EYES: PERRLA; conjunctiva and sclera clear  ENMT: Moist oral mucosa, no pharyngeal injection or exudates; normal dentition  NECK: Supple, no palpable masses; no thyromegaly  RESPIRATORY: Normal respiratory effort; lungs are clear to auscultation bilaterally  CARDIOVASCULAR: Mild discomfort to palpation in ICD site,  Regular rate and rhythm, normal S1 and S2, no murmur/rub/gallop; No lower extremity edema; Peripheral pulses are 2+ bilaterally  ABDOMEN: Nontender to palpation, normoactive bowel sounds, no rebound/guarding; No hepatosplenomegaly  MUSCULOSKELETAL:  Normal gait; no clubbing or cyanosis of digits; no joint swelling or tenderness to palpation  PSYCH: A+O to person, place, and time; affect appropriate  NEUROLOGY: CN 2-12 are intact and symmetric; no gross sensory deficits   SKIN: No rashes; no palpable lesions    LABS:                        14.0   6.62  )-----------( 194      ( 28 Feb 2023 04:02 )             43.6     02-28    132<L>  |  104  |  23  ----------------------------<  91  4.4   |  20<L>  |  1.70<H>    Ca    9.0      28 Feb 2023 04:02  Phos  3.6     02-28  Mg     1.90     02-28      PT/INR - ( 27 Feb 2023 06:00 )   PT: 12.9 sec;   INR: 1.11 ratio         PTT - ( 27 Feb 2023 06:00 )  PTT:30.0 sec          COVID-19 PCR: NotDetec (27 Feb 2023 10:12)  COVID-19 PCR: Mell (23 Feb 2023 22:43)

## 2023-02-28 NOTE — PROGRESS NOTE ADULT - PROBLEM SELECTOR PLAN 4
SCDs for now CKD [id number]  His baseline ~ 1.5-1.6 and follows up with Dr. Gutiérrez.   His renal function is stable  as per renal ; stable from renal for dc. pt advised to follow up with dr. gutiérrez in 1-2wks post hd  - monitor BMP.  - avoid nephrotoxic agents

## 2023-03-01 VITALS
HEART RATE: 73 BPM | SYSTOLIC BLOOD PRESSURE: 117 MMHG | OXYGEN SATURATION: 100 % | DIASTOLIC BLOOD PRESSURE: 76 MMHG | TEMPERATURE: 99 F | RESPIRATION RATE: 18 BRPM

## 2023-03-01 LAB
ANION GAP SERPL CALC-SCNC: 10 MMOL/L — SIGNIFICANT CHANGE UP (ref 7–14)
BUN SERPL-MCNC: 21 MG/DL — SIGNIFICANT CHANGE UP (ref 7–23)
CALCIUM SERPL-MCNC: 9.2 MG/DL — SIGNIFICANT CHANGE UP (ref 8.4–10.5)
CHLORIDE SERPL-SCNC: 103 MMOL/L — SIGNIFICANT CHANGE UP (ref 98–107)
CO2 SERPL-SCNC: 19 MMOL/L — LOW (ref 22–31)
CREAT SERPL-MCNC: 1.56 MG/DL — HIGH (ref 0.5–1.3)
EGFR: 47 ML/MIN/1.73M2 — LOW
GLUCOSE SERPL-MCNC: 104 MG/DL — HIGH (ref 70–99)
HCT VFR BLD CALC: 41.9 % — SIGNIFICANT CHANGE UP (ref 39–50)
HGB BLD-MCNC: 13.5 G/DL — SIGNIFICANT CHANGE UP (ref 13–17)
MAGNESIUM SERPL-MCNC: 1.9 MG/DL — SIGNIFICANT CHANGE UP (ref 1.6–2.6)
MCHC RBC-ENTMCNC: 28.4 PG — SIGNIFICANT CHANGE UP (ref 27–34)
MCHC RBC-ENTMCNC: 32.2 GM/DL — SIGNIFICANT CHANGE UP (ref 32–36)
MCV RBC AUTO: 88 FL — SIGNIFICANT CHANGE UP (ref 80–100)
NRBC # BLD: 0 /100 WBCS — SIGNIFICANT CHANGE UP (ref 0–0)
NRBC # FLD: 0 K/UL — SIGNIFICANT CHANGE UP (ref 0–0)
OSMOLALITY UR: 433 MOSM/KG — SIGNIFICANT CHANGE UP (ref 50–1200)
PHOSPHATE SERPL-MCNC: 3.1 MG/DL — SIGNIFICANT CHANGE UP (ref 2.5–4.5)
PLATELET # BLD AUTO: 174 K/UL — SIGNIFICANT CHANGE UP (ref 150–400)
POTASSIUM SERPL-MCNC: 4.4 MMOL/L — SIGNIFICANT CHANGE UP (ref 3.5–5.3)
POTASSIUM SERPL-SCNC: 4.4 MMOL/L — SIGNIFICANT CHANGE UP (ref 3.5–5.3)
RBC # BLD: 4.76 M/UL — SIGNIFICANT CHANGE UP (ref 4.2–5.8)
RBC # FLD: 13.5 % — SIGNIFICANT CHANGE UP (ref 10.3–14.5)
SODIUM SERPL-SCNC: 132 MMOL/L — LOW (ref 135–145)
SODIUM UR-SCNC: 43 MMOL/L — SIGNIFICANT CHANGE UP
WBC # BLD: 5.96 K/UL — SIGNIFICANT CHANGE UP (ref 3.8–10.5)
WBC # FLD AUTO: 5.96 K/UL — SIGNIFICANT CHANGE UP (ref 3.8–10.5)

## 2023-03-01 PROCEDURE — 99239 HOSP IP/OBS DSCHRG MGMT >30: CPT

## 2023-03-01 RX ORDER — OXYCODONE HYDROCHLORIDE 5 MG/1
1 TABLET ORAL
Qty: 9 | Refills: 0
Start: 2023-03-01 | End: 2023-03-03

## 2023-03-01 RX ORDER — FUROSEMIDE 40 MG
1 TABLET ORAL
Qty: 10 | Refills: 0
Start: 2023-03-01 | End: 2023-03-30

## 2023-03-01 RX ORDER — FUROSEMIDE 40 MG
1 TABLET ORAL
Qty: 30 | Refills: 0
Start: 2023-03-01 | End: 2023-03-30

## 2023-03-01 RX ADMIN — CARVEDILOL PHOSPHATE 25 MILLIGRAM(S): 80 CAPSULE, EXTENDED RELEASE ORAL at 18:13

## 2023-03-01 RX ADMIN — SACUBITRIL AND VALSARTAN 1 TABLET(S): 24; 26 TABLET, FILM COATED ORAL at 05:45

## 2023-03-01 RX ADMIN — LEVETIRACETAM 500 MILLIGRAM(S): 250 TABLET, FILM COATED ORAL at 18:13

## 2023-03-01 RX ADMIN — Medication 650 MILLIGRAM(S): at 14:13

## 2023-03-01 RX ADMIN — Medication 650 MILLIGRAM(S): at 04:49

## 2023-03-01 RX ADMIN — CLOPIDOGREL BISULFATE 75 MILLIGRAM(S): 75 TABLET, FILM COATED ORAL at 12:07

## 2023-03-01 RX ADMIN — CARVEDILOL PHOSPHATE 25 MILLIGRAM(S): 80 CAPSULE, EXTENDED RELEASE ORAL at 05:45

## 2023-03-01 RX ADMIN — Medication 650 MILLIGRAM(S): at 15:13

## 2023-03-01 RX ADMIN — Medication 81 MILLIGRAM(S): at 12:07

## 2023-03-01 RX ADMIN — Medication 650 MILLIGRAM(S): at 04:19

## 2023-03-01 RX ADMIN — SACUBITRIL AND VALSARTAN 1 TABLET(S): 24; 26 TABLET, FILM COATED ORAL at 18:13

## 2023-03-01 RX ADMIN — LEVETIRACETAM 500 MILLIGRAM(S): 250 TABLET, FILM COATED ORAL at 05:44

## 2023-03-01 RX ADMIN — Medication 25 MILLIGRAM(S): at 05:45

## 2023-03-01 NOTE — CHART NOTE - NSCHARTNOTEFT_GEN_A_CORE
Nutrition Note:    Pt scheduled to be d/c today.  Since d/c is imminent, complete nutrition assessment at this time is not warranted.  Will remain available if plans changed.     Pricilla Stone RDN #[id number]

## 2023-03-01 NOTE — PROVIDER CONTACT NOTE (OTHER) - ASSESSMENT
On assessment, pt. continues to complain of mild pain to left chest wall, where ICD was placed on 2/27. Pt. states pain as a 3 on a scale of 0-3. Tylenol given, relief achieved. Niece called and said pt. continues to call and states he does not feel well. Niece is hesitant to bring patient home and wants to prevent "needing to take him back to the ER." On assessment, pt. is stable. No distress noted. Patient states to RN, "I I know I need to leave, I am ready."
see VS flowsheet. pt asymptomatic
On assessment, pt. is asymptomatic. Denies SOB and/or chest pain at this time.

## 2023-03-01 NOTE — PROGRESS NOTE ADULT - SUBJECTIVE AND OBJECTIVE BOX
[facility]   MD ROSMERY FLOREZ MD KRISTINE SOLTANPOUR, DO ANGELA WONG, PA    TEL:  OFFICE: [phone]  From 5pm-7am Answering Service [phone]    -- RENAL FOLLOW UP NOTE ---Date of Service 03-01-23 @ 10:16    Patient is a 72y old  Male who presents with a chief complaint of Increased SOB, referred for ICD evaluation (28 Feb 2023 15:12)      Patient seen and examined at bedside. No chest pain/sob    VITALS:  T(F): 98.2 (03-01-23 @ 05:40), Max: 98.6 (02-28-23 @ 21:31)  HR: 66 (03-01-23 @ 05:40)  BP: 106/77 (03-01-23 @ 05:40)  RR: 18 (03-01-23 @ 05:40)  SpO2: 99% (03-01-23 @ 05:40)  Wt(kg): --    02-28 @ 07:01  -  03-01 @ 07:00  --------------------------------------------------------  IN: 850 mL / OUT: 1 mL / NET: 849 mL          PHYSICAL EXAM:  General: NAD  Neck: No JVD  Respiratory: CTAB, no wheezes, rales or rhonchi  Cardiovascular: S1, S2, RRR  Gastrointestinal: BS+, soft, NT/ND  Extremities: No peripheral edema    Hospital Medications:   MEDICATIONS  (STANDING):  aspirin enteric coated 81 milliGRAM(s) Oral daily  atorvastatin 40 milliGRAM(s) Oral at bedtime  carvedilol 25 milliGRAM(s) Oral every 12 hours  clopidogrel Tablet 75 milliGRAM(s) Oral daily  hydrALAZINE 25 milliGRAM(s) Oral every 8 hours  levETIRAcetam 500 milliGRAM(s) Oral two times a day  sacubitril 24 mG/valsartan 26 mG 1 Tablet(s) Oral two times a day      LABS:  03-01    132<L>  |  103  |  21  ----------------------------<  104<H>  4.4   |  19<L>  |  1.56<H>    Ca    9.2      01 Mar 2023 03:25  Phos  3.1     03-01  Mg     1.90     03-01      Creatinine Trend: 1.56 <--, 1.70 <--, 1.58 <--, 1.46 <--, 1.46 <--, 1.63 <--, 1.53 <--, 1.53 <--    Phosphorus Level, Serum: 3.1 mg/dL (03-01 @ 03:25)                              13.5   5.96  )-----------( 174      ( 01 Mar 2023 03:25 )             41.9     Urine Studies:      HbA1c 5.6      [02-29-20 @ 05:30]  TSH 0.79      [02-23-23 @ 05:35]  Lipid: chol 72, , HDL 34, LDL --      [02-23-23 @ 05:35]        RADIOLOGY & ADDITIONAL STUDIES:

## 2023-03-01 NOTE — PROGRESS NOTE ADULT - ASSESSMENT
73yo man with MHx of NICM, severe sys HF (LVEF 35-40% with GDMT from 25-30% ), severe functional MR, HTN, and CKD III (baseline Cr 1.6),  seizure/cardiac arrest, prolonged hospitalization at [facility] 12/19/2022 was d/c to rehab on 1/6/2023; course was c/b a coma (per [employer] review) Per the patient, he was at [facility] since 1/6/2023 and was discharged home on 2/20/2023. He was told by the rehab to make an appointment with his HF doctor. Dr. Charles. Patient had an appointment with him today and was recommended to come to [facility] for an ICD evaluation    CKD [id number]  His baseline ~ 1.5-1.6 and follows up with Dr. Gutiérrez.   His renal function is stable  stable from renal for dc. pt advised to follow up with dr. gutiérrez in 1-2wks post hd  - monitor BMP.  - avoid nephrotoxic agents    HFrEF  EF of 32% on Echo   s/p ICD 2/27  f/u team    HTN  controlled  monitor    hyponatremia  ? etiology   check urine na and osmo  free water restriction <1L/day   monitor

## 2023-03-01 NOTE — PROVIDER CONTACT NOTE (OTHER) - ACTION/TREATMENT ORDERED:
ACP present at bedside to assess pt. Pt. is no distress at this time. As per ACP, pt. cleared for discharge.
provider notified, no new orders at this time
As per ACP, continue to monitor pt. at this time.

## 2023-03-01 NOTE — PROGRESS NOTE ADULT - REASON FOR ADMISSION
Increased SOB, referred for ICD evaluation

## 2023-03-01 NOTE — PROVIDER CONTACT NOTE (OTHER) - BACKGROUND
Admitting dx: SOB. Dx: CHF, BARROW
pt presented with SOB, pmh HTN.
Admitting dx: SOB. Dx: CHF, BARROW

## 2023-03-01 NOTE — PROGRESS NOTE ADULT - PROVIDER SPECIALTY LIST ADULT
Electrophysiology
Electrophysiology
Heart Failure
Nephrology
Electrophysiology
Electrophysiology
Hospitalist
Nephrology
Hospitalist
Nephrology
Nephrology

## 2023-03-01 NOTE — PROVIDER CONTACT NOTE (OTHER) - RECOMMENDATIONS
As per ACP, continue to monitor pt. at this time.
ACP present at bedside to assess pt. Pt. is no distress at this time. As per ACP, pt. cleared for discharge.

## 2023-03-08 ENCOUNTER — APPOINTMENT (OUTPATIENT)
Dept: ELECTROPHYSIOLOGY | Facility: CLINIC | Age: 73
End: 2023-03-08
Payer: MEDICAID

## 2023-03-08 VITALS — DIASTOLIC BLOOD PRESSURE: 76 MMHG | SYSTOLIC BLOOD PRESSURE: 111 MMHG | HEART RATE: 72 BPM

## 2023-03-08 PROCEDURE — 99024 POSTOP FOLLOW-UP VISIT: CPT

## 2023-03-08 RX ORDER — METOPROLOL SUCCINATE 25 MG/1
25 TABLET, EXTENDED RELEASE ORAL
Qty: 9 | Refills: 3 | Status: DISCONTINUED | COMMUNITY
Start: 2023-02-22 | End: 2023-03-08

## 2023-03-15 ENCOUNTER — NON-APPOINTMENT (OUTPATIENT)
Age: 73
End: 2023-03-15

## 2023-03-15 ENCOUNTER — APPOINTMENT (OUTPATIENT)
Dept: HEART FAILURE | Facility: CLINIC | Age: 73
End: 2023-03-15
Payer: MEDICAID

## 2023-03-15 VITALS
DIASTOLIC BLOOD PRESSURE: 72 MMHG | WEIGHT: 166.25 LBS | HEIGHT: 65 IN | BODY MASS INDEX: 27.7 KG/M2 | TEMPERATURE: 97.6 F | OXYGEN SATURATION: 100 % | SYSTOLIC BLOOD PRESSURE: 119 MMHG | HEART RATE: 66 BPM

## 2023-03-15 DIAGNOSIS — I34.0 NONRHEUMATIC MITRAL (VALVE) INSUFFICIENCY: ICD-10-CM

## 2023-03-15 PROCEDURE — 99214 OFFICE O/P EST MOD 30 MIN: CPT | Mod: 25

## 2023-03-15 PROCEDURE — 93000 ELECTROCARDIOGRAM COMPLETE: CPT

## 2023-03-16 PROBLEM — I34.0 MITRAL REGURGITATION: Status: ACTIVE | Noted: 2020-10-28

## 2023-03-16 NOTE — CARDIOLOGY SUMMARY
[de-identified] : 3/15/23 NSR 66, LVH, NSST\par \par 2/22/23 NSR 76, LVH, NSST\par \par 10/19/22 - NSR, LVH, LAE\par \par 6/10/22 NSR 62, LVH with strain pattern\par \par 9/21/20: SR, LVH with strain pattern   [de-identified] : \par Holter 9/2020: 5% PVC burden, HR  bpm, no NSVT\par  [de-identified] : 2/23/ 2023 TTE LVEF 31%, mild MR, severe LV systolic dysfunction, mild diastolic dysfunction stage 1, normal LA, RA, RV\par \par TTE 6/29/2021: LV 5.4 cm, LVEF 35-40% with mild global hypokinesis, mild-moderate MR, mild AI, normal RV size/function, IVC small/collapsing, estimated PASP 18 mmHg\par \par TTE 9/3/20: LV 6.6 cm, mild concentric LVH, LVEF 20-25%, LVOT VTI 12 cm, restrictive diastolic filling with E/A 3.3 and e' velocities in 3-5 range, normal RV size/function, severe mitral regurgitation, mild TR, mild-moderate AI\par  [de-identified] : \par Cardiac MRI 11/2020:  ml with elevated LV mas index of 95 g/m2, LVEF 22%, normal RV size/function, linear mesocardial LGE along septum and anterior wall c/w nonischemic cardiomyopathy  [de-identified] : \par [facility] 3/2020: minimal non-obstructive CAD\par RH 3/2020: RA 1, PA 43/14 (27), PCWP 18mmHg, LVEDP 15 mmHg, Lissa CO/CI 3.8/2.2\par

## 2023-03-16 NOTE — REASON FOR VISIT
[Cardiac Failure] : cardiac failure [Other: _____] : [unfilled] [FreeTextEntry1] :  year old Male with h/o HTN, CKD III (baseline Cr 1.6), HFrEF/NICM (EF\par 30-35%, LVEDD 6.6 cm), recent seizure/cardiac arrest with prolonged\par hospitalization at [facility] /c/b coma (12/22)\par  \par Appears slightly hypovolemic today.\par  \par Problem/Plan -

## 2023-03-16 NOTE — ASSESSMENT
[FreeTextEntry1] : 71 y/o M with a history of ACC/AHA Stage C NICM (LV 6.6 cm, LVEF 35-40% with GDMT from 25-30% ) with severe functional MR, history of poorly controlled HTN, and CKD III (baseline Cr 1.6) presenting to HF clinic for further management. Referred by Dr. Riddle. Previously followed by Dr. Sparks. S/P hospitalization 12/19/22 at [facility] secondary to seizure/cardiac arrest.S/P  ICD implanted on 2/27/23. Pt is here for a post hospital follow up after admission 3/1-3/22/23 for ICD implantation. ACC/AHA Stage C, NYHA Class II-III symptoms\par Pt is currently euvolemic and normotensive \par \par 1, ACC/AHA Stage C NICM\par - Etiology: suspect hypertensive cardiomyopathy, no signs of infiltrative disease on cMRI\par - continue gretchen 12.5 mg daily\par - decreased furosemide to 40 mg alt with 20 mg daily with additional as needed for weight gain of 2-3 lbs in 2-3 days\par - continue  Coreg 25 mg BID\par = continue Entresto 24-26 mg bid, may consider increase next visit\par - pt is on HDZN  25 mg TID\par - Continue Jardiance 10 mg daily, goal weight 160-162 pounds \par - Prior TTE on 6/29/21 overall LV dimensions have reduced and LVEF has improved. \par  Pt has had caridac arrest x 2, and was admitted and is S/P ICD implantation 2/27/23\par \par 2 CKD - \par -recheck  renal function \par \par 3 Severe functional MR\par  2/23/ 2023 TTE LVEF 31%, mild MR, severe LV systolic dysfunction, mild diastolic dysfunction stage 1, normal LA, RA, RV\par \par 4 HTN\par - controlling with GDMT as above \par \par Follow up in the office in 4-6 weeks with Dr. Charles\par

## 2023-03-16 NOTE — PHYSICAL EXAM
[Well Developed] : well developed [Well Nourished] : well nourished [No Acute Distress] : no acute distress [Normal S1, S2] : normal S1, S2 [No Murmur] : no murmur [No Rub] : no rub [No Gallop] : no gallop [Clear Lung Fields] : clear lung fields [No Respiratory Distress] : no respiratory distress  [Soft] : abdomen soft [Non Tender] : non-tender [No Edema] : no edema [Normal Radial B/L] : normal radial B/L [Moves all extremities] : moves all extremities [No Focal Deficits] : no focal deficits [Normal Speech] : normal speech [Alert and Oriented] : alert and oriented [Normal memory] : normal memory [de-identified] : JVP 8 cm H2O, no HJR [de-identified] : Left chest ICD with healing left infraclavicular incision [de-identified] : abdominal hernia [de-identified] : slow gait [de-identified] : Warm peripherally

## 2023-03-16 NOTE — HISTORY OF PRESENT ILLNESS
[FreeTextEntry1] : Mr. Torrez is a 71 y/o M with a history of ACC/AHA Stage C NICM (LV 6.6 cm, LVEF 35-40% with GDMT from 25-30% ) with severe functional MR, history of poorly controlled HTN, and CKD III (baseline Cr 1.6) presenting to HF clinic for further management. Referred by Dr. Riddle. Previously followed by Dr. Sparks. S/P hospitalization 12/19/22at [facility] secondary to seizure/cardiac arrest.S/P  ICD implanted on 2/27/23. Pt is here for a post hospital follow up after admission 3/1-3/22/23 for ICD implantation. \par \par For full initial details pleas refer to note from 6/10/22. \par \par Since last visit, S/P hospitalization 12/19/22 at [facility] secondary to seizure/cardiac arrest.at home and then another seizure with cardiac arrest when he got to [facility].  States he was in a coma for 3 days. Told he was too weak to have a PPM/defib. He went to rehab in January and was d/c 2/9/23.\par \par Patient is here for a follow up today with his niece Maddy.He  was referred to ED for expedited evaluation with repeat TTE and consideration of ICD implant. Patient got ICD implanted on 2/27/23, had some tenderness to palpation at site which resolved. States he has his remote monitor plugged in.TTE 23/23 TTE LVEF 31%, mild MR. \par \par Adhering to low salt diet and is drinking less than 2 liters per day. Reports his appetite is good. Weight stable at 166 lbs and B/P 119/72. Labs with K 4.4 and creat 1.56. \par \par Currently, states he can walk approx 2 blocks with a cane.  He walked a flight of stairs without dyspnea. He sleeps with 2 pillows with no orthopnea/PND. \par \par He denies chest pain, palpitations, dizziness/LH, syncope and no ICD shocks. \par \par \par \par  He denies LH/dizziness, CP, palpitations, orthopnea, PND, ABD discomfort and LE swelling. Pt does not have an ICD. \par \par Adherent to low sodium diet. States he drinks a lot but thinks it is less than 2 L. He lives with his niece Maddy and she is managing his medications. \par \par Has received Covid vaccines (hasn't received bivalent vaccine). Reports he had Covid at the time of his appendix surgery 1/22. Hasn't received flu vaccine.

## 2023-03-23 ENCOUNTER — NON-APPOINTMENT (OUTPATIENT)
Age: 73
End: 2023-03-23

## 2023-03-29 ENCOUNTER — APPOINTMENT (OUTPATIENT)
Dept: INTERNAL MEDICINE | Facility: CLINIC | Age: 73
End: 2023-03-29

## 2023-04-03 ENCOUNTER — APPOINTMENT (OUTPATIENT)
Dept: INTERNAL MEDICINE | Facility: CLINIC | Age: 73
End: 2023-04-03
Payer: MEDICARE

## 2023-04-03 ENCOUNTER — OUTPATIENT (OUTPATIENT)
Dept: OUTPATIENT SERVICES | Facility: HOSPITAL | Age: 73
LOS: 1 days | End: 2023-04-03

## 2023-04-03 VITALS
HEIGHT: 65 IN | WEIGHT: 164 LBS | RESPIRATION RATE: 16 BRPM | OXYGEN SATURATION: 99 % | HEART RATE: 60 BPM | SYSTOLIC BLOOD PRESSURE: 120 MMHG | BODY MASS INDEX: 27.32 KG/M2 | DIASTOLIC BLOOD PRESSURE: 80 MMHG

## 2023-04-03 DIAGNOSIS — L02.619 CUTANEOUS ABSCESS OF UNSPECIFIED FOOT: ICD-10-CM

## 2023-04-03 DIAGNOSIS — Z87.898 PERSONAL HISTORY OF OTHER SPECIFIED CONDITIONS: ICD-10-CM

## 2023-04-03 DIAGNOSIS — Z00.00 ENCOUNTER FOR GENERAL ADULT MEDICAL EXAMINATION W/OUT ABNORMAL FINDINGS: ICD-10-CM

## 2023-04-03 DIAGNOSIS — I10 ESSENTIAL (PRIMARY) HYPERTENSION: ICD-10-CM

## 2023-04-03 DIAGNOSIS — N28.1 CYST OF KIDNEY, ACQUIRED: ICD-10-CM

## 2023-04-03 DIAGNOSIS — B35.3 TINEA PEDIS: ICD-10-CM

## 2023-04-03 DIAGNOSIS — G47.00 INSOMNIA, UNSPECIFIED: ICD-10-CM

## 2023-04-03 DIAGNOSIS — R07.89 OTHER CHEST PAIN: ICD-10-CM

## 2023-04-03 DIAGNOSIS — Z00.00 ENCOUNTER FOR GENERAL ADULT MEDICAL EXAMINATION WITHOUT ABNORMAL FINDINGS: ICD-10-CM

## 2023-04-03 DIAGNOSIS — I50.9 HEART FAILURE, UNSPECIFIED: ICD-10-CM

## 2023-04-03 DIAGNOSIS — Z13.228 ENCOUNTER FOR SCREENING FOR OTHER METABOLIC DISORDERS: ICD-10-CM

## 2023-04-03 DIAGNOSIS — Z86.79 PERSONAL HISTORY OF OTHER DISEASES OF THE CIRCULATORY SYSTEM: ICD-10-CM

## 2023-04-03 DIAGNOSIS — G40.909 EPILEPSY, UNSPECIFIED, NOT INTRACTABLE, WITHOUT STATUS EPILEPTICUS: ICD-10-CM

## 2023-04-03 DIAGNOSIS — Z87.448 PERSONAL HISTORY OF OTHER DISEASES OF URINARY SYSTEM: ICD-10-CM

## 2023-04-03 DIAGNOSIS — N18.30 CHRONIC KIDNEY DISEASE, STAGE 3 UNSPECIFIED: ICD-10-CM

## 2023-04-03 DIAGNOSIS — I50.22 CHRONIC SYSTOLIC (CONGESTIVE) HEART FAILURE: ICD-10-CM

## 2023-04-03 DIAGNOSIS — Z20.822 CONTACT WITH AND (SUSPECTED) EXPOSURE TO COVID-19: ICD-10-CM

## 2023-04-03 DIAGNOSIS — Z12.5 ENCOUNTER FOR SCREENING FOR MALIGNANT NEOPLASM OF PROSTATE: ICD-10-CM

## 2023-04-03 PROCEDURE — 99387 INIT PM E/M NEW PAT 65+ YRS: CPT

## 2023-04-07 LAB
ALBUMIN SERPL ELPH-MCNC: 4.6 G/DL
ALP BLD-CCNC: 99 U/L
ALT SERPL-CCNC: 13 U/L
ANION GAP SERPL CALC-SCNC: 12 MMOL/L
AST SERPL-CCNC: 17 U/L
BASOPHILS # BLD AUTO: 0.05 K/UL
BASOPHILS NFR BLD AUTO: 0.9 %
BILIRUB SERPL-MCNC: 0.6 MG/DL
BUN SERPL-MCNC: 17 MG/DL
CALCIUM SERPL-MCNC: 9.5 MG/DL
CHLORIDE SERPL-SCNC: 102 MMOL/L
CHOLEST SERPL-MCNC: 92 MG/DL
CO2 SERPL-SCNC: 24 MMOL/L
CREAT SERPL-MCNC: 1.89 MG/DL
EGFR: 37 ML/MIN/1.73M2
EOSINOPHIL # BLD AUTO: 0.13 K/UL
EOSINOPHIL NFR BLD AUTO: 2.4 %
ESTIMATED AVERAGE GLUCOSE: 131 MG/DL
GLUCOSE SERPL-MCNC: 99 MG/DL
HBA1C MFR BLD HPLC: 6.2 %
HBV CORE IGM SER QL: NONREACTIVE
HBV E AB SER QL: NONREACTIVE
HBV E AG SER QL: NONREACTIVE
HBV SURFACE AB SER QL: NONREACTIVE
HBV SURFACE AG SER QL: NONREACTIVE
HCT VFR BLD CALC: 46.4 %
HCV AB SER QL: NONREACTIVE
HCV S/CO RATIO: 0.38 S/CO
HDLC SERPL-MCNC: 41 MG/DL
HGB BLD-MCNC: 14.8 G/DL
HIV1+2 AB SPEC QL IA.RAPID: NONREACTIVE
IMM GRANULOCYTES NFR BLD AUTO: 0.2 %
LDLC SERPL CALC-MCNC: 30 MG/DL
LYMPHOCYTES # BLD AUTO: 2.41 K/UL
LYMPHOCYTES NFR BLD AUTO: 45.2 %
MAN DIFF?: NORMAL
MCHC RBC-ENTMCNC: 28.8 PG
MCHC RBC-ENTMCNC: 31.9 GM/DL
MCV RBC AUTO: 90.3 FL
MONOCYTES # BLD AUTO: 0.6 K/UL
MONOCYTES NFR BLD AUTO: 11.3 %
NEUTROPHILS # BLD AUTO: 2.13 K/UL
NEUTROPHILS NFR BLD AUTO: 40 %
NONHDLC SERPL-MCNC: 51 MG/DL
PLATELET # BLD AUTO: 186 K/UL
POTASSIUM SERPL-SCNC: 4.7 MMOL/L
PROT SERPL-MCNC: 7.4 G/DL
RBC # BLD: 5.14 M/UL
RBC # FLD: 13.8 %
SODIUM SERPL-SCNC: 138 MMOL/L
TRIGL SERPL-MCNC: 104 MG/DL
TSH SERPL-ACNC: 1.18 UIU/ML
WBC # FLD AUTO: 5.33 K/UL

## 2023-04-10 ENCOUNTER — NON-APPOINTMENT (OUTPATIENT)
Age: 73
End: 2023-04-10

## 2023-04-12 PROBLEM — Z00.00 PREVENTATIVE HEALTH CARE: Status: ACTIVE | Noted: 2023-04-12

## 2023-04-12 NOTE — HISTORY OF PRESENT ILLNESS
[FreeTextEntry1] : est care [de-identified] : SAINTONGE JACQUES is a 72 year old male with CHF s/p pacemaker (managed by cardiology), CKD stage 3, HTN, Seizure disorder here for to establish care. Mr. CHRISTINE feels well and has no new concerns. \par

## 2023-04-19 ENCOUNTER — NON-APPOINTMENT (OUTPATIENT)
Age: 73
End: 2023-04-19

## 2023-04-19 ENCOUNTER — APPOINTMENT (OUTPATIENT)
Dept: HEART FAILURE | Facility: CLINIC | Age: 73
End: 2023-04-19
Payer: MEDICARE

## 2023-04-19 VITALS
SYSTOLIC BLOOD PRESSURE: 107 MMHG | WEIGHT: 169 LBS | TEMPERATURE: 97.6 F | BODY MASS INDEX: 28.16 KG/M2 | DIASTOLIC BLOOD PRESSURE: 69 MMHG | HEART RATE: 59 BPM | HEIGHT: 65 IN | OXYGEN SATURATION: 97 %

## 2023-04-19 PROCEDURE — 99214 OFFICE O/P EST MOD 30 MIN: CPT

## 2023-04-19 PROCEDURE — 93000 ELECTROCARDIOGRAM COMPLETE: CPT

## 2023-04-28 ENCOUNTER — NON-APPOINTMENT (OUTPATIENT)
Age: 73
End: 2023-04-28

## 2023-05-03 ENCOUNTER — APPOINTMENT (OUTPATIENT)
Dept: CV DIAGNOSITCS | Facility: HOSPITAL | Age: 73
End: 2023-05-03
Payer: MEDICARE

## 2023-05-03 ENCOUNTER — OUTPATIENT (OUTPATIENT)
Dept: OUTPATIENT SERVICES | Facility: HOSPITAL | Age: 73
LOS: 1 days | End: 2023-05-03

## 2023-05-03 DIAGNOSIS — I63.9 CEREBRAL INFARCTION, UNSPECIFIED: ICD-10-CM

## 2023-05-03 PROCEDURE — 93880 EXTRACRANIAL BILAT STUDY: CPT | Mod: 26

## 2023-05-03 NOTE — ASSESSMENT
[FreeTextEntry1] : Mr. Torrez is a 72 y/o M with a history of ACC/AHA Stage C NICM/HFrEF (LV 6.6 cm, LVEF 30-35%) c/b prior cardiac arrest (12/22) s/p ICD (3/23), poorly controlled HTN, and CKD III (baseline Cr 1.6) presenting to HF clinic for further management. Currently ACC stage C, NYHA class II and appears euvolemic. \par \par 1, ACC/AHA Stage C NICM\par - Etiology: suspect hypertensive cardiomyopathy, no signs of infiltrative disease on cMRI\par - continue marco 12.5 mg daily\par - decreased furosemide to 40 mg every other day with extra as needed\par - continue Coreg 25 mg BID\par - continue Entresto 24-26 mg bid; given recent Cr 1.9 will defer escalation for now\par - c/w HDZN  25 mg TID\par - Continue Jardiance 10 mg daily, goal weight 166 pounds \par \par 2 CKD - stage 3b\par - will monitor\par \par 3 HTN - controlled\par - controlling with GDMT as above\par \par 4. ? TIA 12/22\par - obtain carotid dopplers  \par \par RTC in 3 and 6 months with NP\par

## 2023-05-03 NOTE — HISTORY OF PRESENT ILLNESS
[FreeTextEntry1] : Mr. Torrez is a 74 y/o M with a history of ACC/AHA Stage C NICM/HFrEF (LV 6.6 cm, LVEF 30-35%) c/b prior cardiac arrest (12/22) s/p ICD (3/23), poorly controlled HTN, and CKD III (baseline Cr 1.6) presenting to HF clinic for further management. Referred by Dr. Riddle. Previously followed by Dr. Sparks. Accompanied by Maddy. \par \par For full initial details please refer to note from 6/10/22. \par \par Since last visit, has been doing well. Hasn't had any ICD shocks. \par \par Adhering to low salt diet and is drinking less than 2 liters per day. Reports his appetite is good. Weight stable at 166 lbs and B/P 119/72. Labs with K 4.4 and creat 1.56. \par \par Currently, states he can walk approx 2 blocks with a cane.  He walked a flight of stairs without dyspnea. He sleeps with 2 pillows with no orthopnea/PND. \par \par Reports that he may have had a TIA when he was admitted in December at [facility]. \par \par He denies chest pain, palpitations, dizziness/LH, syncope and no ICD shocks. \par \par Adherent to low sodium diet. States he drinks a lot but thinks it is less than 2 L. He lives with his niece Maddy and she is managing his medications. \par \par Has received Covid vaccines (hasn't received bivalent vaccine). Reports he had Covid at the time of his appendix surgery 1/22. Hasn't received flu vaccine.

## 2023-05-03 NOTE — CARDIOLOGY SUMMARY
[de-identified] : \par 4/19/23 unchanged\par 3/15/23 NSR 66, LVH, NSST\par 2/22/23 NSR 76, LVH, NSST\par 10/19/22 - NSR, LVH, LAE\par 6/10/22 NSR 62, LVH with strain pattern\par 9/21/20: SR, LVH with strain pattern   [de-identified] : \par Holter 9/2020: 5% PVC burden, HR  bpm, no NSVT\par  [de-identified] : \par 2/23/23 TTE LVEF 31%, mild MR, severe LV systolic dysfunction, mild diastolic dysfunction stage 1, normal LA, RA, RV\par \par TTE 6/29/2021: LV 5.4 cm, LVEF 35-40% with mild global hypokinesis, mild-moderate MR, mild AI, normal RV size/function, IVC small/collapsing, estimated PASP 18 mmHg\par \par TTE 9/3/20: LV 6.6 cm, mild concentric LVH, LVEF 20-25%, LVOT VTI 12 cm, restrictive diastolic filling with E/A 3.3 and e' velocities in 3-5 range, normal RV size/function, severe mitral regurgitation, mild TR, mild-moderate AI\par  [de-identified] : \par Cardiac MRI 11/2020:  ml with elevated LV mas index of 95 g/m2, LVEF 22%, normal RV size/function, linear mesocardial LGE along septum and anterior wall c/w nonischemic cardiomyopathy  [de-identified] : \par [facility] 3/2020: minimal non-obstructive CAD\par RH 3/2020: RA 1, PA 43/14 (27), PCWP 18mmHg, LVEDP 15 mmHg, Lissa CO/CI 3.8/2.2\par

## 2023-05-03 NOTE — PHYSICAL EXAM
[Well Developed] : well developed [Well Nourished] : well nourished [No Acute Distress] : no acute distress [Normal S1, S2] : normal S1, S2 [No Murmur] : no murmur [No Rub] : no rub [No Gallop] : no gallop [Clear Lung Fields] : clear lung fields [No Respiratory Distress] : no respiratory distress  [Soft] : abdomen soft [Non Tender] : non-tender [No Edema] : no edema [Normal Radial B/L] : normal radial B/L [Moves all extremities] : moves all extremities [No Focal Deficits] : no focal deficits [Normal Speech] : normal speech [Alert and Oriented] : alert and oriented [Normal memory] : normal memory [de-identified] : JVP 8 cm H2O, no HJR [de-identified] : Left chest ICD with healing left infraclavicular incision [de-identified] : abdominal hernia [de-identified] : slow gait [de-identified] : Warm peripherally

## 2023-05-04 ENCOUNTER — APPOINTMENT (OUTPATIENT)
Dept: ELECTROPHYSIOLOGY | Facility: CLINIC | Age: 73
End: 2023-05-04
Payer: MEDICAID

## 2023-05-04 VITALS — DIASTOLIC BLOOD PRESSURE: 73 MMHG | SYSTOLIC BLOOD PRESSURE: 104 MMHG | HEART RATE: 61 BPM

## 2023-05-04 PROCEDURE — 93282 PRGRMG EVAL IMPLANTABLE DFB: CPT

## 2023-05-08 ENCOUNTER — RX RENEWAL (OUTPATIENT)
Age: 73
End: 2023-05-08

## 2023-06-06 ENCOUNTER — RX RENEWAL (OUTPATIENT)
Age: 73
End: 2023-06-06

## 2023-06-19 ENCOUNTER — RX RENEWAL (OUTPATIENT)
Age: 73
End: 2023-06-19

## 2023-07-03 ENCOUNTER — NON-APPOINTMENT (OUTPATIENT)
Age: 73
End: 2023-07-03

## 2023-07-03 ENCOUNTER — OUTPATIENT (OUTPATIENT)
Dept: OUTPATIENT SERVICES | Facility: HOSPITAL | Age: 73
LOS: 1 days | End: 2023-07-03

## 2023-07-03 ENCOUNTER — APPOINTMENT (OUTPATIENT)
Dept: INTERNAL MEDICINE | Facility: CLINIC | Age: 73
End: 2023-07-03
Payer: MEDICARE

## 2023-07-03 VITALS
SYSTOLIC BLOOD PRESSURE: 90 MMHG | WEIGHT: 174.38 LBS | DIASTOLIC BLOOD PRESSURE: 60 MMHG | BODY MASS INDEX: 29.05 KG/M2 | HEIGHT: 65 IN | HEART RATE: 62 BPM | OXYGEN SATURATION: 96 %

## 2023-07-03 DIAGNOSIS — G47.00 INSOMNIA, UNSPECIFIED: ICD-10-CM

## 2023-07-03 PROCEDURE — 99214 OFFICE O/P EST MOD 30 MIN: CPT

## 2023-07-03 NOTE — HISTORY OF PRESENT ILLNESS
[FreeTextEntry1] : follow up on chronic medical conditions.  [de-identified] : SAINTONGE JACQUES is a 72 year old male with CHF s/p pacemaker (managed by cardiology), CKD stage 3, HTN, Seizure disorder here for to meds refills. Mr. CHRISTINE feels well and has no new concerns. Keppra was refilled on 6/28 but pt did not received it due to pharmacy issues. Pharmacy clarified that they received the Rx but need to fill it. \par Rest are cardiac meds managed by cardiology but pt needs refills. \par pt's sister reuesting referral for neurology because of pt's worsening memory issues and podiatry due to pt's h/o bunion. \par

## 2023-07-05 DIAGNOSIS — G40.909 EPILEPSY, UNSPECIFIED, NOT INTRACTABLE, WITHOUT STATUS EPILEPTICUS: ICD-10-CM

## 2023-07-05 DIAGNOSIS — I50.9 HEART FAILURE, UNSPECIFIED: ICD-10-CM

## 2023-07-05 DIAGNOSIS — G47.00 INSOMNIA, UNSPECIFIED: ICD-10-CM

## 2023-07-05 DIAGNOSIS — N18.30 CHRONIC KIDNEY DISEASE, STAGE 3 UNSPECIFIED: ICD-10-CM

## 2023-07-05 DIAGNOSIS — M21.619 BUNION OF UNSPECIFIED FOOT: ICD-10-CM

## 2023-07-05 DIAGNOSIS — R41.3 OTHER AMNESIA: ICD-10-CM

## 2023-07-05 DIAGNOSIS — I10 ESSENTIAL (PRIMARY) HYPERTENSION: ICD-10-CM

## 2023-07-19 ENCOUNTER — NON-APPOINTMENT (OUTPATIENT)
Age: 73
End: 2023-07-19

## 2023-07-19 ENCOUNTER — APPOINTMENT (OUTPATIENT)
Dept: HEART FAILURE | Facility: CLINIC | Age: 73
End: 2023-07-19
Payer: MEDICARE

## 2023-07-19 VITALS — SYSTOLIC BLOOD PRESSURE: 101 MMHG | HEART RATE: 67 BPM | DIASTOLIC BLOOD PRESSURE: 70 MMHG | OXYGEN SATURATION: 97 %

## 2023-07-19 VITALS — BODY MASS INDEX: 28.82 KG/M2 | HEIGHT: 65 IN | WEIGHT: 173 LBS

## 2023-07-19 PROCEDURE — 93000 ELECTROCARDIOGRAM COMPLETE: CPT

## 2023-07-19 PROCEDURE — 99214 OFFICE O/P EST MOD 30 MIN: CPT

## 2023-07-19 NOTE — HISTORY OF PRESENT ILLNESS
[FreeTextEntry1] : Mr. Torrez is a 74 y/o M with a history of ACC/AHA Stage C NICM/HFrEF (LV 6.6 cm, LVEF 30-35%) c/b prior cardiac arrest (12/22) s/p ICD (3/23), poorly controlled HTN, and CKD III (baseline Cr 1.6) presenting to HF clinic for further management. Referred by Dr. Riddle. Previously followed by Dr. Sparks. Accompanied by Maddy. \par \par For full initial details please refer to note from 6/10/22. \par \par Since last visit, has been doing well. Last visit 4/19/23, furosemide 40 mg was changed to every other day and has not required any additional doses. He had lab work with PCP 4/3/23 notable for normal K 4.7 and with BUN/creat 17/1.89. \par \par Adhering to low salt diet and is drinking less than 2 liters per day. Reports his appetite is good. Weight stable at 156-158 lbs or 71-72 kg with goal < 166 lbs and B/P range 100-103. His office weight is 171 lbs due to heavy boots he is wearing. \par \par Currently, states he can walk 20  blocks with a cane.  He walked a flight of stairs without dyspnea. He sleeps with 2 pillows with no orthopnea/PND. \par \par Previously reported  that he may have had a TIA when he was admitted in December at [facility]. \par \par He denies chest pain, palpitations, dizziness/LH, syncope and no ICD shocks. \par \par Adherent to low sodium diet. States he drinks a lot but thinks it is less than 2 L. He lives with his niece Maddy and she is managing his medications. \par \reji Has received Covid vaccines (hasn't received bivalent vaccine). Reports he had Covid at the time of his appendix surgery 1/22. Hasn't received flu vaccine.

## 2023-07-19 NOTE — PHYSICAL EXAM
[Well Developed] : well developed [Well Nourished] : well nourished [No Acute Distress] : no acute distress [Normal S1, S2] : normal S1, S2 [No Murmur] : no murmur [No Rub] : no rub [No Gallop] : no gallop [Clear Lung Fields] : clear lung fields [No Respiratory Distress] : no respiratory distress  [Soft] : abdomen soft [Non Tender] : non-tender [No Edema] : no edema [Normal Radial B/L] : normal radial B/L [Moves all extremities] : moves all extremities [No Focal Deficits] : no focal deficits [Normal Speech] : normal speech [Alert and Oriented] : alert and oriented [Normal memory] : normal memory [de-identified] : JVP 7 cm H2O, no HJR [de-identified] : Left chest ICD  [de-identified] : abdominal hernia [de-identified] : slow gait [de-identified] : Warm peripherally

## 2023-07-19 NOTE — ASSESSMENT
[FreeTextEntry1] : Mr. Torrez is a 74 y/o M with a history of ACC/AHA Stage C NICM/HFrEF (LV 6.6 cm, LVEF 30-35%) c/b prior cardiac arrest (12/22) s/p ICD (3/23), poorly controlled HTN, and CKD III (baseline Cr 1.6) presenting to HF clinic for further management. Currently ACC stage C, NYHA class II and appears euvolemic and low normotensive. \par \par 1, ACC/AHA Stage C NICM\par - Etiology: suspect hypertensive cardiomyopathy, no signs of infiltrative disease on cMRI\par - continue marco 12.5 mg daily\par - continue furosemide 40 mg every other day with extra as needed, will recheck labs with PCP\par - continue Coreg 25 mg BID\par - continue Entresto 24-26 mg bid; given recent Cr 1.89 will defer escalation for now\par - c/w HDZN  25 mg TID\par - Continue Jardiance 10 mg daily, goal weight 166 pounds and he is 156-158 lbs at home \par \par 2 CKD - stage 3b\par - will monitor\par \par 3 HTN - controlled\par - controlling with GDMT as above\par \par 4. ? TIA 12/22\par - carotid dopplers  with no hemodynamically significant stenosis\par - continue statin\par \par RTC in 3 months with Dr. Charles\par

## 2023-07-19 NOTE — CARDIOLOGY SUMMARY
[de-identified] : 7/19/23 NSR 68, LVH, NSST\par 4/19/23 unchanged\par 3/15/23 NSR 66, LVH, NSST\par 2/22/23 NSR 76, LVH, NSST\par 10/19/22 - NSR, LVH, LAE\par 6/10/22 NSR 62, LVH with strain pattern\par 9/21/20: SR, LVH with strain pattern   [de-identified] : \par Holter 9/2020: 5% PVC burden, HR  bpm, no NSVT\par  [de-identified] : \par 2/23/23 TTE LVEF 31%, mild MR, severe LV systolic dysfunction, mild diastolic dysfunction stage 1, normal LA, RA, RV\par \par TTE 6/29/2021: LV 5.4 cm, LVEF 35-40% with mild global hypokinesis, mild-moderate MR, mild AI, normal RV size/function, IVC small/collapsing, estimated PASP 18 mmHg\par \par TTE 9/3/20: LV 6.6 cm, mild concentric LVH, LVEF 20-25%, LVOT VTI 12 cm, restrictive diastolic filling with E/A 3.3 and e' velocities in 3-5 range, normal RV size/function, severe mitral regurgitation, mild TR, mild-moderate AI\par  [de-identified] : \par Cardiac MRI 11/2020:  ml with elevated LV mas index of 95 g/m2, LVEF 22%, normal RV size/function, linear mesocardial LGE along septum and anterior wall c/w nonischemic cardiomyopathy  [de-identified] : \par [facility] 3/2020: minimal non-obstructive CAD\par RH 3/2020: RA 1, PA 43/14 (27), PCWP 18mmHg, LVEDP 15 mmHg, Lissa CO/CI 3.8/2.2\par

## 2023-07-20 ENCOUNTER — APPOINTMENT (OUTPATIENT)
Dept: NEUROLOGY | Facility: CLINIC | Age: 73
End: 2023-07-20
Payer: MEDICARE

## 2023-07-20 VITALS
WEIGHT: 166 LBS | HEART RATE: 67 BPM | BODY MASS INDEX: 28.34 KG/M2 | HEIGHT: 64 IN | DIASTOLIC BLOOD PRESSURE: 70 MMHG | SYSTOLIC BLOOD PRESSURE: 113 MMHG

## 2023-07-20 DIAGNOSIS — R40.0 SOMNOLENCE: ICD-10-CM

## 2023-07-20 DIAGNOSIS — Z72.820 SLEEP DEPRIVATION: ICD-10-CM

## 2023-07-20 DIAGNOSIS — Z78.9 OTHER SPECIFIED HEALTH STATUS: ICD-10-CM

## 2023-07-20 PROCEDURE — 99205 OFFICE O/P NEW HI 60 MIN: CPT

## 2023-07-24 ENCOUNTER — OUTPATIENT (OUTPATIENT)
Dept: OUTPATIENT SERVICES | Facility: HOSPITAL | Age: 73
LOS: 1 days | End: 2023-07-24
Payer: COMMERCIAL

## 2023-07-24 ENCOUNTER — APPOINTMENT (OUTPATIENT)
Dept: PODIATRY | Facility: HOSPITAL | Age: 73
End: 2023-07-24
Payer: MEDICARE

## 2023-07-24 VITALS
HEART RATE: 56 BPM | SYSTOLIC BLOOD PRESSURE: 107 MMHG | DIASTOLIC BLOOD PRESSURE: 70 MMHG | WEIGHT: 166 LBS | RESPIRATION RATE: 16 BRPM | TEMPERATURE: 97 F | HEIGHT: 64 IN | BODY MASS INDEX: 28.34 KG/M2

## 2023-07-24 DIAGNOSIS — M21.611 BUNION OF RIGHT FOOT: ICD-10-CM

## 2023-07-24 DIAGNOSIS — M21.612 BUNION OF LEFT FOOT: ICD-10-CM

## 2023-07-24 DIAGNOSIS — M79.609 PAIN IN UNSPECIFIED LIMB: ICD-10-CM

## 2023-07-24 PROCEDURE — G0463: CPT

## 2023-07-24 PROCEDURE — XXXXX: CPT | Mod: 1L

## 2023-07-24 RX ORDER — DICLOFENAC SODIUM 1% 10 MG/G
1 GEL TOPICAL DAILY
Qty: 1 | Refills: 2 | Status: ACTIVE | COMMUNITY
Start: 2023-07-24 | End: 1900-01-01

## 2023-07-24 NOTE — HISTORY OF PRESENT ILLNESS
[FreeTextEntry1] : Pt presented to the clinic with foot pain. Pt stated he experiences along the bunion bump on both feet (L>R) when he wears his shoes. Pain has bunion to both feet for 10 years. Pt has not tried wide shoe or any analgesics. Pt has been referred to us by his PCP. Pt denies numbness or tingling.\par Pt denies N/VSOB/F

## 2023-07-24 NOTE — PHYSICAL EXAM
[2+] : left foot dorsalis pedis 2+ [Skin Lesions] : no skin lesions [Ankle Swelling (On Exam)] : not present [Varicose Veins Of Lower Extremities] : not present [] : not present [de-identified] : Prominent medial eminence of the first MPJ B/L, B/L tracking bunion. Limited hallux Dorsiflexion range of motion B/L. [Foot Ulcer] : no foot ulcer

## 2023-07-24 NOTE — ASSESSMENT
[Good - alert, interested, motivated] : Good - alert, interested, motivated [FreeTextEntry1] : A 72 yo M presented to clinic with painful bunion (L>R)\par - Pt seen and evaluated.\par - B/L painful bunion (L>R), with tracking, limited hallux dorsiflexion\par - Instructed pt to wear wide shoe gear\par - prescribed 1% Voltaren gel\par - Ordered B/L foot x- ray (3 views) to be done before next visit\par - Pt is interested in a surgical option\par - RTC in 4 weeks with X-rays have to be obtained prior to visit

## 2023-08-02 ENCOUNTER — APPOINTMENT (OUTPATIENT)
Dept: NEUROLOGY | Facility: CLINIC | Age: 73
End: 2023-08-02
Payer: MEDICARE

## 2023-08-02 PROCEDURE — 95816 EEG AWAKE AND DROWSY: CPT

## 2023-08-07 ENCOUNTER — APPOINTMENT (OUTPATIENT)
Dept: ELECTROPHYSIOLOGY | Facility: CLINIC | Age: 73
End: 2023-08-07
Payer: MEDICARE

## 2023-08-07 ENCOUNTER — NON-APPOINTMENT (OUTPATIENT)
Age: 73
End: 2023-08-07

## 2023-08-07 PROCEDURE — 93295 DEV INTERROG REMOTE 1/2/MLT: CPT

## 2023-08-07 PROCEDURE — 93296 REM INTERROG EVL PM/IDS: CPT

## 2023-08-16 ENCOUNTER — APPOINTMENT (OUTPATIENT)
Dept: NUCLEAR MEDICINE | Facility: IMAGING CENTER | Age: 73
End: 2023-08-16
Payer: MEDICARE

## 2023-08-16 ENCOUNTER — OUTPATIENT (OUTPATIENT)
Dept: OUTPATIENT SERVICES | Facility: HOSPITAL | Age: 73
LOS: 1 days | End: 2023-08-16
Payer: COMMERCIAL

## 2023-08-16 DIAGNOSIS — G40.909 EPILEPSY, UNSPECIFIED, NOT INTRACTABLE, WITHOUT STATUS EPILEPTICUS: ICD-10-CM

## 2023-08-16 PROCEDURE — A9552: CPT

## 2023-08-16 PROCEDURE — 78608 BRAIN IMAGING (PET): CPT | Mod: 26

## 2023-08-16 PROCEDURE — 78608 BRAIN IMAGING (PET): CPT

## 2023-09-07 NOTE — PLAN
[TextEntry] :  - Patient may continue Levetiracetam 500mg PO BID for seizure prophylaxis. No renewal needed now.  - Routine EEG to evaluate for subclinical seizures.  - PET-FDG Brain ordered to help distinguish between Alzheimer's vs. frontotemporal dementia.  - Neuropsychological testing to help determine the extent of cognitive impairment.  - The patient has been counseled on conservative measures to promote cerebrovascular health and slow down cognitive decline:      - Maintain at least 30 minutes daily of aerobic/cardiovascular exercise (at minimum, walking).      - Maintain a diet low in fat and refined sugars, and high in fruits and vegetables (e.g., a Mediterranean-style diet).      - Perform activities on a daily basis that engage mental activity, such as completing word/number puzzles or reading new materials.  - Memory Disorders referral for evaluation and management of cognitive decline.  - Follow up in 3-4 months for comparative cognitive and neurobehavioral testing.

## 2023-09-07 NOTE — HISTORY OF PRESENT ILLNESS
[FreeTextEntry1] : History is provided primarily by the patient's niece, with some contribution from the patient. The patient can communicate mostly in English, and translation form French Creole is provided as needed by the patient's niece.  This is a 73-year-old right-handed man who presented to the ED in December 2022 because "his heart stopped." He had a pacemaker placed and was discharged to an inpatient rehabilitation center, from which he was discharged in February 2023. He was documented as having a seizure during his hospitalization, and he was started on Levetiracetam 500mg PO BID. He has not had any witnessed seizures, tongue or cheek bite, or urinary or fecal incontinence since returning home. He has a history of short-term memory loss and auditory hallucinations since 2008, and back then even had episodes during which he walked out of his home alone and was found sleeping outdoors. He continues to have short-term memory loss and disorientation since his recent hospitalization, but these have not seemed to worsen.

## 2023-09-07 NOTE — PHYSICAL EXAM
[FreeTextEntry1] : Neurocognitive Examination Functionality Questionnaire  Relationship: Self & Niece Frequency of interactions in the past month: Daily / Lives with  [place] Index of [place] in Activities of Daily Livin. Bathing/Showerin 2. Dressin 3. Toiletin 4. Transferrin 5. Continence: 1 6: Feedin  TOTAL: 6   Tyra-Bjorn Instrumental Activities of Daily Living: A. Ability to Use Telephone: 1 (dials a few well-known numbers) B. Shoppin C. Food Preparation: 0 D. Housekeepin E. Laundry: 0 F. Transportation: 0 G. Responsibility for Own Medications: 0 (niece sets up pillbox and monitors medications) H: Ability to Handle Finances: 0  TOTAL: 8  [General Appearance - Alert] : alert [General Appearance - In No Acute Distress] : in no acute distress [Oriented to Person] : oriented to person [Oriented to Place] : oriented to place [Oriented to Time] : disoriented to time [Person] : oriented to person [Place] : oriented to place [Time] : disoriented to time [Concentration Intact] : a decrease in concentrating ability was observed [Visual Intact] : visual attention was ~T not ~L decreased [Fluency] : fluency intact [Comprehension] : comprehension intact [Reading] : reading intact [Cranial Nerves Optic (II)] : visual acuity intact bilaterally,  visual fields full to confrontation, pupils equal round and reactive to light [Cranial Nerves Oculomotor (III)] : extraocular motion intact [Cranial Nerves Trigeminal (V)] : facial sensation intact symmetrically [Cranial Nerves Facial (VII)] : face symmetrical [Cranial Nerves Vestibulocochlear (VIII)] : hearing was intact bilaterally [Cranial Nerves Glossopharyngeal (IX)] : tongue and palate midline [Cranial Nerves Accessory (XI - Cranial And Spinal)] : head turning and shoulder shrug symmetric [Cranial Nerves Hypoglossal (XII)] : there was no tongue deviation with protrusion [Motor Strength] : muscle strength was normal in all four extremities [No Muscle Atrophy] : normal bulk in all four extremities [Motor Handedness Right-Handed] : the patient is right hand dominant [Paresis Pronator Drift Right-Sided] : no pronator drift on the right [Paresis Pronator Drift Left-Sided] : no pronator drift on the left [Motor Strength Upper Extremities Bilaterally] : strength was normal in both upper extremities [Motor Strength Lower Extremities Bilaterally] : strength was normal in both lower extremities [Sensation Tactile Decrease] : light touch was intact [Past-pointing] : there was no past-pointing [Tremor] : no tremor present [Coordination - Dysmetria Impaired Finger-to-Nose Bilateral] : not present [Coordination - Dysmetria Impaired Heel-to-Shin Bilateral] : not present [1+] : Ankle jerk left 1+ [Plantar Reflex Right Only] : normal on the right [Plantar Reflex Left Only] : normal on the left [___] : absent on the right [___] : absent on the left [FreeTextEntry4] : Immediate recall: 3/3 (Apple, Table, Blue). 5-minute delayed recall: 1/3 (recalls 1 word with category cue, recalls 1 word with MCQ cue). [FreeTextEntry8] : Normal, narrow-based gait. Patient is unable to participate in specialized gait testing. [Sclera] : the sclera and conjunctiva were normal [PERRL With Normal Accommodation] : pupils were equal in size, round, reactive to light, with normal accommodation [Extraocular Movements] : extraocular movements were intact [Outer Ear] : the ears and nose were normal in appearance [Oropharynx] : the oropharynx was normal [Neck Appearance] : the appearance of the neck was normal [Auscultation Breath Sounds / Voice Sounds] : lungs were clear to auscultation bilaterally [Heart Rate And Rhythm] : heart rate was normal and rhythm regular [Heart Sounds] : normal S1 and S2 [Arterial Pulses Carotid] : carotid pulses were normal with no bruits [Full Pulse] : the pedal pulses are present [Abdomen Soft] : soft [Abdomen Tenderness] : non-tender [No CVA Tenderness] : no ~M costovertebral angle tenderness [No Spinal Tenderness] : no spinal tenderness [Nail Clubbing] : no clubbing  or cyanosis of the fingernails [Motor Tone] : muscle strength and tone were normal [Skin Color & Pigmentation] : normal skin color and pigmentation [] : no rash

## 2023-09-07 NOTE — ASSESSMENT
[FreeTextEntry1] : 73 RHM with seizures and short-term memory loss, concerning for dementia, following cardiac arrest and pacemaker placement

## 2023-10-11 NOTE — PHYSICAL THERAPY INITIAL EVALUATION ADULT - BALANCE DISTURBANCE, IDENTIFIED IMPAIRMENT CONTRIBUTE, REHAB EVAL
Please call patient to schedule a FU appt.  LOV- 3/23/23  Was scheduled for 6/15/23 NO Show  Once scheduled we can ask Dr.D Turner if he will refill script.   decreased strength

## 2023-10-18 ENCOUNTER — NON-APPOINTMENT (OUTPATIENT)
Age: 73
End: 2023-10-18

## 2023-10-18 ENCOUNTER — APPOINTMENT (OUTPATIENT)
Dept: HEART FAILURE | Facility: CLINIC | Age: 73
End: 2023-10-18
Payer: MEDICARE

## 2023-10-18 VITALS
OXYGEN SATURATION: 98 % | HEIGHT: 64 IN | DIASTOLIC BLOOD PRESSURE: 71 MMHG | HEART RATE: 63 BPM | WEIGHT: 173 LBS | SYSTOLIC BLOOD PRESSURE: 110 MMHG | TEMPERATURE: 97 F | BODY MASS INDEX: 29.53 KG/M2

## 2023-10-18 PROCEDURE — 99214 OFFICE O/P EST MOD 30 MIN: CPT | Mod: 25

## 2023-10-18 PROCEDURE — 36415 COLL VENOUS BLD VENIPUNCTURE: CPT

## 2023-10-18 PROCEDURE — 93000 ELECTROCARDIOGRAM COMPLETE: CPT

## 2023-10-19 LAB
ALBUMIN SERPL ELPH-MCNC: 4.5 G/DL
ALP BLD-CCNC: 77 U/L
ALT SERPL-CCNC: 18 U/L
ANION GAP SERPL CALC-SCNC: 15 MMOL/L
AST SERPL-CCNC: 21 U/L
BILIRUB SERPL-MCNC: 1.2 MG/DL
BUN SERPL-MCNC: 18 MG/DL
CALCIUM SERPL-MCNC: 9.1 MG/DL
CHLORIDE SERPL-SCNC: 102 MMOL/L
CHOLEST SERPL-MCNC: 77 MG/DL
CO2 SERPL-SCNC: 19 MMOL/L
CREAT SERPL-MCNC: 1.9 MG/DL
EGFR: 37 ML/MIN/1.73M2
ESTIMATED AVERAGE GLUCOSE: 143 MG/DL
HBA1C MFR BLD HPLC: 6.6 %
HCT VFR BLD CALC: 44.8 %
HDLC SERPL-MCNC: 38 MG/DL
HGB BLD-MCNC: 14.5 G/DL
LDLC SERPL CALC-MCNC: 21 MG/DL
MCHC RBC-ENTMCNC: 30.8 PG
MCHC RBC-ENTMCNC: 32.4 GM/DL
MCV RBC AUTO: 95.1 FL
NONHDLC SERPL-MCNC: 38 MG/DL
NT-PROBNP SERPL-MCNC: 87 PG/ML
PLATELET # BLD AUTO: 206 K/UL
POTASSIUM SERPL-SCNC: 5 MMOL/L
PROT SERPL-MCNC: 8 G/DL
RBC # BLD: 4.71 M/UL
RBC # FLD: 13.3 %
SODIUM SERPL-SCNC: 136 MMOL/L
TRIGL SERPL-MCNC: 82 MG/DL
TSH SERPL-ACNC: 1.69 UIU/ML
WBC # FLD AUTO: 5.54 K/UL

## 2023-11-01 ENCOUNTER — OUTPATIENT (OUTPATIENT)
Dept: OUTPATIENT SERVICES | Facility: HOSPITAL | Age: 73
LOS: 1 days | End: 2023-11-01
Payer: COMMERCIAL

## 2023-11-01 ENCOUNTER — APPOINTMENT (OUTPATIENT)
Dept: PODIATRY | Facility: HOSPITAL | Age: 73
End: 2023-11-01
Payer: MEDICARE

## 2023-11-01 ENCOUNTER — RESULT REVIEW (OUTPATIENT)
Age: 73
End: 2023-11-01

## 2023-11-01 VITALS — TEMPERATURE: 96 F | HEART RATE: 68 BPM | DIASTOLIC BLOOD PRESSURE: 66 MMHG | SYSTOLIC BLOOD PRESSURE: 93 MMHG

## 2023-11-01 DIAGNOSIS — M21.612 BUNION OF LEFT FOOT: ICD-10-CM

## 2023-11-01 DIAGNOSIS — M21.611 BUNION OF RIGHT FOOT: ICD-10-CM

## 2023-11-01 DIAGNOSIS — M79.609 PAIN IN UNSPECIFIED LIMB: ICD-10-CM

## 2023-11-01 PROCEDURE — 99213 OFFICE O/P EST LOW 20 MIN: CPT

## 2023-11-01 PROCEDURE — 73630 X-RAY EXAM OF FOOT: CPT | Mod: 26,LT

## 2023-11-01 PROCEDURE — 73630 X-RAY EXAM OF FOOT: CPT

## 2023-11-01 PROCEDURE — G0463: CPT

## 2023-11-02 DIAGNOSIS — M21.611 BUNION OF RIGHT FOOT: ICD-10-CM

## 2023-11-02 DIAGNOSIS — M21.612 BUNION OF LEFT FOOT: ICD-10-CM

## 2023-11-06 ENCOUNTER — APPOINTMENT (OUTPATIENT)
Dept: ELECTROPHYSIOLOGY | Facility: CLINIC | Age: 73
End: 2023-11-06
Payer: MEDICARE

## 2023-11-06 ENCOUNTER — APPOINTMENT (OUTPATIENT)
Dept: NEUROLOGY | Facility: CLINIC | Age: 73
End: 2023-11-06
Payer: MEDICARE

## 2023-11-06 ENCOUNTER — NON-APPOINTMENT (OUTPATIENT)
Age: 73
End: 2023-11-06

## 2023-11-06 VITALS
HEIGHT: 64 IN | BODY MASS INDEX: 29.53 KG/M2 | SYSTOLIC BLOOD PRESSURE: 116 MMHG | DIASTOLIC BLOOD PRESSURE: 68 MMHG | WEIGHT: 173 LBS | HEART RATE: 65 BPM

## 2023-11-06 PROCEDURE — G2212 PROLONG OUTPT/OFFICE VIS: CPT

## 2023-11-06 PROCEDURE — 93296 REM INTERROG EVL PM/IDS: CPT

## 2023-11-06 PROCEDURE — 99215 OFFICE O/P EST HI 40 MIN: CPT | Mod: 25

## 2023-11-06 PROCEDURE — 93295 DEV INTERROG REMOTE 1/2/MLT: CPT

## 2023-12-18 NOTE — PHYSICAL EXAM
[FreeTextEntry1] : Neurocognitive Examination Functionality Questionnaire  Relationship: Self Frequency of interactions in the past month: Daily / Lives with  [place]os[place] Index of [place] in Activities of Daily Livin. Bathing/Showerin (niece helps patient in and out of the tub) 2. Dressin (sometimes slow due to left shoulder pain) 3. Toiletin 4. Transferrin 5. Continence: 1 6: Feedin  TOTAL: 6   Tyra-Bjorn Instrumental Activities of Daily Living: A. Ability to Use Telephone: 1 (dials a few well-known numbers) B. Shoppin C. Food Preparation: 0 D. Housekeepin E. Laundry: 0 F. Transportation: 0 G. Responsibility for Own Medications: 0 (niece sets up pillbox and monitors medications) H: Ability to Handle Finances: 0  TOTAL: 1  [Oriented to Place] : disoriented to place [Place] : disoriented to place [Concentration Intact] : a decrease in concentrating ability was observed [Paresis Pronator Drift Right-Sided] : no pronator drift on the right [Paresis Pronator Drift Left-Sided] : no pronator drift on the left [Motor Strength Upper Extremities Bilaterally] : strength was normal in both upper extremities [Motor Strength Lower Extremities Bilaterally] : strength was normal in both lower extremities [Sensation Pain / Temperature Decrease] : pain and temperature was intact [Romberg's Sign] : Romberg's sign was negtive [Past-pointing] : there was no past-pointing [Tremor] : no tremor present [Coordination - Dysmetria Impaired Finger-to-Nose Bilateral] : not present [Coordination - Dysmetria Impaired Heel-to-Shin Bilateral] : not present [Plantar Reflex Right Only] : normal on the right [Plantar Reflex Left Only] : normal on the left [___] : absent on the right [___] : absent on the left [FreeTextEntry4] : Immediate recall: 3/3 (Apple, Table, Blue). 5-minute delayed recall: 2/3 (recalls third word (Table) with category cue). [FreeTextEntry8] : Normal, narrow-based gait. Patient is unable to participate in specialized gait testing.

## 2023-12-18 NOTE — PLAN
[TextEntry] :  - I have provided the patient with a Results Communication letter with the PET-FDG CT Brain and Routine EEG reports.  - Patient may continue Levetiracetam 500mg PO BID for seizure prophylaxis. No increase in dose or renewal needed now since there were no breakthrough seizures clinically or on EEG.  - Neuropsychological testing pending to help determine the extent of cognitive impairment.  - The patient has been counseled on conservative measures to promote cerebrovascular health and slow down cognitive decline:      - Maintain at least 30 minutes daily of aerobic/cardiovascular exercise (at minimum, walking).      - Maintain a diet low in fat and refined sugars, and high in fruits and vegetables (e.g., a Mediterranean-style diet).      - Perform activities on a daily basis that engage mental activity, such as completing word/number puzzles or reading new materials.  - Memory Disorders consult pending for evaluation and management of Alzheimer's dementia.  - Follow up in 3-4 months for comparative cognitive and neurobehavioral testing.

## 2023-12-18 NOTE — ASSESSMENT
[FreeTextEntry1] : 73 RHM with seizures and short-term memory loss, with neuroimaging evidence of Alzheimer's dementia, following cardiac arrest and pacemaker placement

## 2023-12-18 NOTE — HISTORY OF PRESENT ILLNESS
[FreeTextEntry1] : FROM 7/20/23: History is provided primarily by the patient's niece, with some contribution from the patient. The patient can communicate mostly in English, and translation form French Creole is provided as needed by the patient's niece.  This is a 73-year-old right-handed man who presented to the ED in December 2022 because "his heart stopped." He had a pacemaker placed and was discharged to an inpatient rehabilitation center, from which he was discharged in February 2023. He was documented as having a seizure during his hospitalization, and he was started on Levetiracetam 500mg PO BID. He has not had any witnessed seizures, tongue or cheek bite, or urinary or fecal incontinence since returning home. He has a history of short-term memory loss and auditory hallucinations since 2008, and back then even had episodes during which he walked out of his home alone and was found sleeping outdoors. He continues to have short-term memory loss and disorientation since his recent hospitalization, but these have not seemed to worsen.  FROM 11/6/23: Since I last saw the patient on 7/20/23, he states that he has been healthy with no breakthrough seizures, although in the past couple of days he has developed a new cough and congestion in his throat. He reports that he continues to have some short-term memory loss, but no worse than it was during the last visit, and he also reports that he remembers distant memories well. He denies any changes in his behavior or mood, although he acknowledges sometimes feeling upset about his illnesses.

## 2023-12-18 NOTE — DATA REVIEWED
[de-identified] : Routine EEG (8/2/23): - Abundant, polymorphic, left temporal theta/delta slowing, occasionally sharply contoured [de-identified] : PET-FDG CT Brain (8/16/23): - Hypometabolism pattern consistent with Alzheimer's dementia

## 2024-01-04 ENCOUNTER — RX RENEWAL (OUTPATIENT)
Age: 74
End: 2024-01-04

## 2024-01-05 RX ORDER — LEVETIRACETAM 500 MG/1
500 TABLET, FILM COATED, EXTENDED RELEASE ORAL
Qty: 60 | Refills: 5 | Status: ACTIVE | COMMUNITY
Start: 2023-02-22 | End: 1900-01-01

## 2024-01-08 ENCOUNTER — OUTPATIENT (OUTPATIENT)
Dept: OUTPATIENT SERVICES | Facility: HOSPITAL | Age: 74
LOS: 1 days | End: 2024-01-08

## 2024-01-08 ENCOUNTER — APPOINTMENT (OUTPATIENT)
Dept: INTERNAL MEDICINE | Facility: CLINIC | Age: 74
End: 2024-01-08
Payer: MEDICARE

## 2024-01-08 VITALS
WEIGHT: 175 LBS | DIASTOLIC BLOOD PRESSURE: 68 MMHG | OXYGEN SATURATION: 99 % | BODY MASS INDEX: 29.88 KG/M2 | SYSTOLIC BLOOD PRESSURE: 121 MMHG | HEART RATE: 66 BPM | HEIGHT: 64 IN

## 2024-01-08 DIAGNOSIS — G40.909 EPILEPSY, UNSPECIFIED, NOT INTRACTABLE, W/OUT STATUS EPILEPTICUS: ICD-10-CM

## 2024-01-08 DIAGNOSIS — F02.818 ALZHEIMER'S DISEASE WITH LATE ONSET: ICD-10-CM

## 2024-01-08 DIAGNOSIS — Z23 ENCOUNTER FOR IMMUNIZATION: ICD-10-CM

## 2024-01-08 DIAGNOSIS — R41.3 OTHER AMNESIA: ICD-10-CM

## 2024-01-08 DIAGNOSIS — M21.619 BUNION OF UNSPECIFIED FOOT: ICD-10-CM

## 2024-01-08 DIAGNOSIS — G30.1 ALZHEIMER'S DISEASE WITH LATE ONSET: ICD-10-CM

## 2024-01-08 PROCEDURE — G2211 COMPLEX E/M VISIT ADD ON: CPT | Mod: NC

## 2024-01-08 PROCEDURE — 99214 OFFICE O/P EST MOD 30 MIN: CPT | Mod: 25

## 2024-01-08 RX ORDER — ASPIRIN 81 MG/1
81 TABLET, DELAYED RELEASE ORAL DAILY
Qty: 90 | Refills: 3 | Status: ACTIVE | COMMUNITY
Start: 2023-02-22 | End: 1900-01-01

## 2024-01-08 RX ORDER — CARVEDILOL 25 MG/1
25 TABLET, FILM COATED ORAL TWICE DAILY
Qty: 180 | Refills: 3 | Status: ACTIVE | COMMUNITY
Start: 1900-01-01 | End: 1900-01-01

## 2024-01-08 RX ORDER — SPIRONOLACTONE 25 MG/1
25 TABLET ORAL
Qty: 45 | Refills: 3 | Status: ACTIVE | COMMUNITY
Start: 2023-03-15 | End: 1900-01-01

## 2024-01-08 RX ORDER — EMPAGLIFLOZIN 10 MG/1
10 TABLET, FILM COATED ORAL
Qty: 90 | Refills: 3 | Status: ACTIVE | COMMUNITY
Start: 2022-10-21 | End: 1900-01-01

## 2024-01-08 RX ORDER — CHLORHEXIDINE GLUCONATE 4 %
5 LIQUID (ML) TOPICAL
Qty: 1 | Refills: 1 | Status: DISCONTINUED | COMMUNITY
Start: 2023-02-22 | End: 2024-01-08

## 2024-01-08 RX ORDER — ATORVASTATIN CALCIUM 40 MG/1
40 TABLET, FILM COATED ORAL
Qty: 90 | Refills: 3 | Status: ACTIVE | COMMUNITY
Start: 2023-02-22 | End: 1900-01-01

## 2024-01-08 RX ORDER — CLOPIDOGREL BISULFATE 75 MG/1
75 TABLET, FILM COATED ORAL DAILY
Qty: 90 | Refills: 3 | Status: ACTIVE | COMMUNITY
Start: 2023-02-22 | End: 1900-01-01

## 2024-01-08 RX ORDER — FAMOTIDINE 20 MG/1
20 TABLET, FILM COATED ORAL
Qty: 30 | Refills: 3 | Status: ACTIVE | COMMUNITY
Start: 2023-10-18 | End: 1900-01-01

## 2024-01-08 NOTE — HISTORY OF PRESENT ILLNESS
[FreeTextEntry1] : follow up on chronic medical conditions.  [de-identified] : SAINTONGE JACQUES is a 72 year old male with CHF s/p pacemaker (managed by cardiology), CKD stage 3, HTN, Dm2, Seizure disorder here for mgmt of chronic conditions. Mr. CHRISTINE feels well and has no new concerns. Pt reports compliance with medications without any new side effects. Seen by cardiology, nephro and neuro in the intrim since last visit. October labs are stable.

## 2024-01-08 NOTE — PHYSICAL EXAM
[de-identified] :  no acute distress, well nourished and well developed. Eyes:  normal sclera/conjunctiva, pupils equal round and reactive to light and extraocular movements intact. Pulmonary:  no respiratory distress, no accessory muscle use, lungs were clear to auscultation bilaterally. Cardiac:  normal rate, with a regular rhythm, normal S1 and S2 and no murmur heard. Vascular: the pedal pulses are present, there was no peripheral edema and no extremity clubbing/cyanosis. Abdomen:  abdomen soft, non-tender, non-distended and normal bowel sounds. Neurology:  coordination grossly intact and no focal deficits. Psychiatric:  the affect was normal and insight and judgment were intact.

## 2024-01-09 DIAGNOSIS — G40.909 EPILEPSY, UNSPECIFIED, NOT INTRACTABLE, WITHOUT STATUS EPILEPTICUS: ICD-10-CM

## 2024-01-09 DIAGNOSIS — E11.8 TYPE 2 DIABETES MELLITUS WITH UNSPECIFIED COMPLICATIONS: ICD-10-CM

## 2024-01-09 DIAGNOSIS — I10 ESSENTIAL (PRIMARY) HYPERTENSION: ICD-10-CM

## 2024-01-09 DIAGNOSIS — I63.9 CEREBRAL INFARCTION, UNSPECIFIED: ICD-10-CM

## 2024-01-09 DIAGNOSIS — G30.1 ALZHEIMER'S DISEASE WITH LATE ONSET: ICD-10-CM

## 2024-01-09 DIAGNOSIS — I50.9 HEART FAILURE, UNSPECIFIED: ICD-10-CM

## 2024-01-09 DIAGNOSIS — N18.30 CHRONIC KIDNEY DISEASE, STAGE 3 UNSPECIFIED: ICD-10-CM

## 2024-01-09 LAB
ALBUMIN SERPL ELPH-MCNC: 4.7 G/DL
ALP BLD-CCNC: 69 U/L
ALT SERPL-CCNC: 15 U/L
ANION GAP SERPL CALC-SCNC: 11 MMOL/L
AST SERPL-CCNC: 19 U/L
BILIRUB SERPL-MCNC: 0.4 MG/DL
BUN SERPL-MCNC: 18 MG/DL
CALCIUM SERPL-MCNC: 9.2 MG/DL
CHLORIDE SERPL-SCNC: 103 MMOL/L
CHOLEST SERPL-MCNC: 106 MG/DL
CO2 SERPL-SCNC: 23 MMOL/L
CREAT SERPL-MCNC: 1.89 MG/DL
CREAT SPEC-SCNC: 33 MG/DL
EGFR: 37 ML/MIN/1.73M2
ESTIMATED AVERAGE GLUCOSE: 154 MG/DL
GLUCOSE SERPL-MCNC: 119 MG/DL
HBA1C MFR BLD HPLC: 7 %
HDLC SERPL-MCNC: 32 MG/DL
LDLC SERPL CALC-MCNC: 44 MG/DL
MICROALBUMIN 24H UR DL<=1MG/L-MCNC: <1.2 MG/DL
MICROALBUMIN/CREAT 24H UR-RTO: NORMAL MG/G
NONHDLC SERPL-MCNC: 74 MG/DL
POTASSIUM SERPL-SCNC: 4.4 MMOL/L
PROT SERPL-MCNC: 7.8 G/DL
SODIUM SERPL-SCNC: 138 MMOL/L
TRIGL SERPL-MCNC: 178 MG/DL

## 2024-01-24 ENCOUNTER — APPOINTMENT (OUTPATIENT)
Dept: CV DIAGNOSITCS | Facility: HOSPITAL | Age: 74
End: 2024-01-24

## 2024-01-24 ENCOUNTER — APPOINTMENT (OUTPATIENT)
Dept: HEART FAILURE | Facility: CLINIC | Age: 74
End: 2024-01-24

## 2024-01-24 NOTE — ASSESSMENT
[FreeTextEntry1] : Mr. Torrez is a 72 y/o M with a history of ACC/AHA Stage C NICM/HFrEF (LV 6.6 cm, LVEF 30-35%) c/b prior cardiac arrest (12/22) s/p ICD (3/23), poorly controlled HTN, and CKD III (baseline Cr 1.6) presenting to HF clinic for further management. Currently ACC stage C, NYHA class II and appears euvolemic and normotensive with mild weigh gain.    1, ACC/AHA Stage C NICM  - Etiology: suspect hypertensive cardiomyopathy, no signs of infiltrative disease on cMRI  - continue marco 12.5 mg daily  - continue furosemide 40 mg daily with extra as needed, will recheck labs today  - continue Coreg 25 mg BID  - continue Entresto 24-26 mg bid; given recent Cr 1.89 will defer escalation for now  - c/w HDZN 25 mg TID  - Continue Jardiance 10 mg daily, goal weight 166 pounds   - will add Claritin to see if it helps for cough and pepcid 20 mg for possible GERD and will follow up with PCP Dr. Gay  - schedule TTE with next appt    2 CKD - stage 3b  - will monitor    3 HTN - controlled  - controlling with GDMT as above    4. ? TIA 12/22  - carotid dopplers with no hemodynamically significant stenosis  - continue statin    RTC in 3 months with Dr. Charles on the same day as TTE, will call with labs .

## 2024-01-24 NOTE — HISTORY OF PRESENT ILLNESS
[FreeTextEntry1] : Mr. Torrez is a 74 y/o M with a history of ACC/AHA Stage C NICM/HFrEF (LV 6.6 cm, LVEF 30-35%) c/b prior cardiac arrest (12/22) s/p ICD (3/23), poorly controlled HTN, and CKD III (baseline Cr 1.6) presenting to HF clinic for further management. Referred by Dr. Riddle. Previously followed by Dr. Sparks. Accompanied by niece (Maddy).   For full initial details please refer to note from 6/10/22.   Since last visit in October, has been doing well.   Adhering to low salt diet and is drinking about 2 liters per day. Reports his appetite is good. Not weighing himself at home since scale broke but is 173 lbs from 166 lbs last visit in office with goal < 166 lbs. Not checking home B/P and is 110/71 today.   Currently, states he can walk 20 blocks twice a day with a cane.  He climbs flights of stairs several times a day without dyspnea. He sleeps with 1 pillow with no orthopnea/PND.   Previously reported that he may have had a TIA when he was admitted in December 2022 at [facility]. Last admission at [facility] 2/22/23-3/1/23 with ADHF and had ICD implanted for possible secondary prevention after cardiac arrest in 12/22.   He denies chest pain, palpitations, dizziness/LH, syncope , seizure and no ICD shocks.   He lives with his niece Maddy and she is managing his medications.   Has been followed by neurology for short-term memory issues and history of possible seizure in 12/22 (during hospitalization for cardiac arrest). Had a PET brain scan 8/16/23 which was suggestive of possible Alzheimer's or mixed dementia.   Has received Covid vaccines (hasn't received bivalent vaccine). Reports he had Covid at the time of his appendix surgery 1/22. Hasn't received flu vaccine.

## 2024-01-24 NOTE — PHYSICAL EXAM
[Well Nourished] : well nourished [Well Developed] : well developed [No Acute Distress] : no acute distress [No Murmur] : no murmur [Normal S1, S2] : normal S1, S2 [No Rub] : no rub [No Gallop] : no gallop [Clear Lung Fields] : clear lung fields [Soft] : abdomen soft [No Respiratory Distress] : no respiratory distress  [Non Tender] : non-tender [No Edema] : no edema [Normal Radial B/L] : normal radial B/L [Moves all extremities] : moves all extremities [No Focal Deficits] : no focal deficits [Normal Speech] : normal speech [Alert and Oriented] : alert and oriented [Normal memory] : normal memory [de-identified] : Left chest ICD  [de-identified] : JVP 7 cm H2O, no HJR [de-identified] : abdominal hernia [de-identified] : slow gait [de-identified] : Warm peripherally

## 2024-01-24 NOTE — CARDIOLOGY SUMMARY
[de-identified] : 1/24/24 -  10/18/23 NSR 69, NSST 7/19/23 NSR 68, LVH, NSST 4/19/23 unchanged 3/15/23 NSR 66, LVH, NSST 2/22/23 NSR 76, LVH, NSST 10/19/22 - NSR, LVH, LAE 6/10/22 NSR 62, LVH with strain pattern 9/21/20: SR, LVH with strain pattern   [de-identified] : \par  Holter 9/2020: 5% PVC burden, HR  bpm, no NSVT\par   [de-identified] : \par  2/23/23 TTE LVEF 31%, mild MR, severe LV systolic dysfunction, mild diastolic dysfunction stage 1, normal LA, RA, RV\par  \par  TTE 6/29/2021: LV 5.4 cm, LVEF 35-40% with mild global hypokinesis, mild-moderate MR, mild AI, normal RV size/function, IVC small/collapsing, estimated PASP 18 mmHg\par  \par  TTE 9/3/20: LV 6.6 cm, mild concentric LVH, LVEF 20-25%, LVOT VTI 12 cm, restrictive diastolic filling with E/A 3.3 and e' velocities in 3-5 range, normal RV size/function, severe mitral regurgitation, mild TR, mild-moderate AI\par   [de-identified] : \par  Cardiac MRI 11/2020:  ml with elevated LV mas index of 95 g/m2, LVEF 22%, normal RV size/function, linear mesocardial LGE along septum and anterior wall c/w nonischemic cardiomyopathy  [de-identified] : \par  [facility] 3/2020: minimal non-obstructive CAD\par  RH 3/2020: RA 1, PA 43/14 (27), PCWP 18mmHg, LVEDP 15 mmHg, Lissa CO/CI 3.8/2.2\par

## 2024-01-24 NOTE — ADDENDUM
[FreeTextEntry1] : Labs reviewed and called with results notable for increase in HgA1C 6.6% from 6.2%, normal lipid profile, and no change in BUN/creat 18/1.9 with K 5, serum pro BNP 87. He will follow up with his PCP to improve glycemic control.

## 2024-02-05 ENCOUNTER — APPOINTMENT (OUTPATIENT)
Dept: ELECTROPHYSIOLOGY | Facility: CLINIC | Age: 74
End: 2024-02-05
Payer: MEDICARE

## 2024-02-05 ENCOUNTER — NON-APPOINTMENT (OUTPATIENT)
Age: 74
End: 2024-02-05

## 2024-02-05 PROCEDURE — 93296 REM INTERROG EVL PM/IDS: CPT

## 2024-02-05 PROCEDURE — 93295 DEV INTERROG REMOTE 1/2/MLT: CPT

## 2024-02-15 ENCOUNTER — APPOINTMENT (OUTPATIENT)
Dept: NEUROLOGY | Facility: CLINIC | Age: 74
End: 2024-02-15

## 2024-02-16 ENCOUNTER — NON-APPOINTMENT (OUTPATIENT)
Age: 74
End: 2024-02-16

## 2024-02-16 ENCOUNTER — APPOINTMENT (OUTPATIENT)
Dept: HEART FAILURE | Facility: CLINIC | Age: 74
End: 2024-02-16
Payer: MEDICARE

## 2024-02-16 VITALS
DIASTOLIC BLOOD PRESSURE: 77 MMHG | HEART RATE: 62 BPM | SYSTOLIC BLOOD PRESSURE: 123 MMHG | HEIGHT: 64 IN | WEIGHT: 177 LBS | OXYGEN SATURATION: 95 % | BODY MASS INDEX: 30.22 KG/M2

## 2024-02-16 PROCEDURE — 99214 OFFICE O/P EST MOD 30 MIN: CPT

## 2024-02-16 PROCEDURE — 93000 ELECTROCARDIOGRAM COMPLETE: CPT

## 2024-02-16 PROCEDURE — 36415 COLL VENOUS BLD VENIPUNCTURE: CPT

## 2024-02-16 RX ORDER — FUROSEMIDE 40 MG/1
40 TABLET ORAL
Qty: 90 | Refills: 3 | Status: DISCONTINUED | COMMUNITY
Start: 2022-10-17 | End: 2024-02-16

## 2024-02-21 LAB
ALBUMIN SERPL ELPH-MCNC: 4.5 G/DL
ALP BLD-CCNC: 73 U/L
ALT SERPL-CCNC: 20 U/L
ANION GAP SERPL CALC-SCNC: 12 MMOL/L
AST SERPL-CCNC: 25 U/L
BILIRUB SERPL-MCNC: 0.8 MG/DL
BUN SERPL-MCNC: 18 MG/DL
CALCIUM SERPL-MCNC: 9.6 MG/DL
CHLORIDE SERPL-SCNC: 104 MMOL/L
CO2 SERPL-SCNC: 23 MMOL/L
CREAT SERPL-MCNC: 1.9 MG/DL
EGFR: 37 ML/MIN/1.73M2
ESTIMATED AVERAGE GLUCOSE: 148 MG/DL
HBA1C MFR BLD HPLC: 6.8 %
HCT VFR BLD CALC: 43.6 %
HGB BLD-MCNC: 14.5 G/DL
MCHC RBC-ENTMCNC: 30 PG
MCHC RBC-ENTMCNC: 33.3 GM/DL
MCV RBC AUTO: 90.3 FL
NT-PROBNP SERPL-MCNC: 89 PG/ML
PLATELET # BLD AUTO: 177 K/UL
POTASSIUM SERPL-SCNC: 4.3 MMOL/L
PROT SERPL-MCNC: 7.9 G/DL
RBC # BLD: 4.83 M/UL
RBC # FLD: 13.8 %
SODIUM SERPL-SCNC: 139 MMOL/L
WBC # FLD AUTO: 5.99 K/UL

## 2024-02-21 NOTE — ASSESSMENT
[FreeTextEntry1] : Mr. Torrez is a 72 y/o M with a history of ACC/AHA Stage C NICM/HFrEF (LV 6.6 cm, LVEF 30-35%) c/b prior cardiac arrest (12/22) s/p ICD (3/23), poorly controlled HTN, and CKD III (baseline Cr 1.6) presenting to HF clinic for further management. Currently ACC stage C, NYHA class II and appears mildly volume overloaded and normotensive in the setting of diet and fluid indiscretions and with mild weigh gain,     1, ACC/AHA Stage C NICM - - Etiology: suspect hypertensive cardiomyopathy, no signs of infiltrative disease on cMRI - will d/c furosemide and will start torsemide 40 mg x 2 days and then 20 mg daily with additional 20 mg as needed for weight gain of 2-3 lbs in 2-3 days with goal weight 166-168 lbs or less - continue marco 12.5 mg daily - will recheck labs today - continue Coreg 25 mg BID - continue Entresto 24-26 mg bid; given recent Cr 1.89 will defer escalation for now - c/w HDZN 25 mg TID - Continue Jardiance 10 mg daily, goal weight 166 pounds  -  follow up with PCP Dr. Gay - schedule TTE with next appt  2 CKD - stage 3b - will monitor, recheck labs today  3 HTN - controlled - controlling with GDMT as above  4. ? TIA 12/22 - carotid dopplers with no hemodynamically significant stenosis - continue statin    RTC in 3 months with Dr. Charles on the same day as TTE, will call with labs .

## 2024-02-21 NOTE — HISTORY OF PRESENT ILLNESS
[FreeTextEntry1] : Mr. Torrez is a 74 y/o M with a history of ACC/AHA Stage C NICM/HFrEF (LV 6.6 cm, LVEF 30-35%) c/b prior cardiac arrest (12/22) s/p ICD (3/23), poorly controlled HTN, and CKD III (baseline Cr 1.6) presenting to HF clinic for further management. Referred by Dr. Riddle. Previously followed by Dr. Sparks. Accompanied by niece (Maddy).   For full initial details please refer to note from 6/10/22.   Returned call to patient's niece on 2/15/24 who reports patient is refusing to go to the hospital for shortness of breath. His weight has gone up by 7 lbs and he is drinking more fluid including soda, beer. He is taking furosemide 40 mg daily so instructed to take additional 40 mg and will increase to 40 mg bid for 2 days. Advised to go to the ED for evaluation but if he doesn't go, he will come to the office for an urgent visit in HF clinic 2/16/24 at 12 noon.  Pt came for appt today with his niece. States he has been drinking soda, beer and has been eating sugar, admits to diet indiscretions and is drinking more that 2 liters of fluid per day. States he is not passing a lot of urine with the furosemide.   His niece reported his weight went up at home by 7 lbs and is 177 lbs from 173 lbs last visit in office with goal < 166 lbs. Not checking home B/P and is 123/71 today.   Currently, states he can walk several blocks with a cane.  He can climb a flight of stairs without dyspnea but feels fatigued after. He sleeps with 1 pillow with no orthopnea/PND. Feels abdominal bloating and decreased appetite.  Previously reported that he may have had a TIA when he was admitted in December 2022 at [facility]. Last admission at [facility] 2/22/23-3/1/23 with ADHF and had ICD implanted for possible secondary prevention after cardiac arrest in 12/22.   He denies chest pain, palpitations, dizziness/LH, syncope, seizure and no ICD shocks.   He lives with his niece Maddy, and she is managing his medications.   Has been followed by neurology for short-term memory issues and history of possible seizure in 12/22 (during hospitalization for cardiac arrest). Had a PET brain scan 8/16/23 which was suggestive of possible Alzheimer's or mixed dementia.   Has received Covid vaccines (hasn't received bivalent vaccine). Reports he had Covid at the time of his appendix surgery 1/22. Hasn't received flu vaccine.

## 2024-02-21 NOTE — CARDIOLOGY SUMMARY
[de-identified] : 2/16/24 NSR 62, LVH, NSST 10/18/23 NSR 69, NSST 7/19/23 NSR 68, LVH, NSST 4/19/23 unchanged 3/15/23 NSR 66, LVH, NSST 2/22/23 NSR 76, LVH, NSST 10/19/22 - NSR, LVH, LAE 6/10/22 NSR 62, LVH with strain pattern 9/21/20: SR, LVH with strain pattern   [de-identified] : \par  Holter 9/2020: 5% PVC burden, HR  bpm, no NSVT\par   [de-identified] : \par  2/23/23 TTE LVEF 31%, mild MR, severe LV systolic dysfunction, mild diastolic dysfunction stage 1, normal LA, RA, RV\par  \par  TTE 6/29/2021: LV 5.4 cm, LVEF 35-40% with mild global hypokinesis, mild-moderate MR, mild AI, normal RV size/function, IVC small/collapsing, estimated PASP 18 mmHg\par  \par  TTE 9/3/20: LV 6.6 cm, mild concentric LVH, LVEF 20-25%, LVOT VTI 12 cm, restrictive diastolic filling with E/A 3.3 and e' velocities in 3-5 range, normal RV size/function, severe mitral regurgitation, mild TR, mild-moderate AI\par   [de-identified] : \par  Cardiac MRI 11/2020:  ml with elevated LV mas index of 95 g/m2, LVEF 22%, normal RV size/function, linear mesocardial LGE along septum and anterior wall c/w nonischemic cardiomyopathy  [de-identified] : \par  [facility] 3/2020: minimal non-obstructive CAD\par  RH 3/2020: RA 1, PA 43/14 (27), PCWP 18mmHg, LVEDP 15 mmHg, Lissa CO/CI 3.8/2.2\par

## 2024-02-21 NOTE — ADDENDUM
[FreeTextEntry1] : Labs reviewed notable for  CMP with K 4.3, BUN/creat 18/1.9 with serum pro BNP 89, H&H 14.5/43.6. Will call to follow up after the weekend to see if he is passing more urine with torsemide and see if he has improvement in his symptoms.  Called to follow up on 2/21/24 and patient is feeling better, adhering to diet and is passing more urine with change to torsemide. He will follow up in the office.

## 2024-02-21 NOTE — PHYSICAL EXAM
[Well Developed] : well developed [Well Nourished] : well nourished [No Acute Distress] : no acute distress [Normal S1, S2] : normal S1, S2 [No Murmur] : no murmur [No Rub] : no rub [No Gallop] : no gallop [No Respiratory Distress] : no respiratory distress  [Soft] : abdomen soft [Non Tender] : non-tender [Normal Radial B/L] : normal radial B/L [Moves all extremities] : moves all extremities [No Focal Deficits] : no focal deficits [Normal Speech] : normal speech [Alert and Oriented] : alert and oriented [Normal memory] : normal memory [de-identified] : JVP 8-10 cm H2O [de-identified] : Left chest ICD  [de-identified] : left base crackles, otherwise clear [de-identified] : slow gait, uses a cane [de-identified] : abdominal hernia [de-identified] : trace lower extremity edema [de-identified] : Warm peripherally

## 2024-03-20 ENCOUNTER — APPOINTMENT (OUTPATIENT)
Dept: NEUROLOGY | Facility: CLINIC | Age: 74
End: 2024-03-20
Payer: MEDICARE

## 2024-03-20 PROCEDURE — 96133 NRPSYC TST EVAL PHYS/QHP EA: CPT

## 2024-03-20 PROCEDURE — 96132 NRPSYC TST EVAL PHYS/QHP 1ST: CPT

## 2024-03-20 PROCEDURE — 96136 PSYCL/NRPSYC TST PHY/QHP 1ST: CPT

## 2024-03-20 PROCEDURE — 96116 NUBHVL XM PHYS/QHP 1ST HR: CPT

## 2024-03-27 ENCOUNTER — APPOINTMENT (OUTPATIENT)
Dept: NEUROLOGY | Facility: CLINIC | Age: 74
End: 2024-03-27
Payer: MEDICARE

## 2024-03-27 PROCEDURE — 96133 NRPSYC TST EVAL PHYS/QHP EA: CPT

## 2024-03-27 PROCEDURE — 96137 PSYCL/NRPSYC TST PHY/QHP EA: CPT

## 2024-04-05 ENCOUNTER — APPOINTMENT (OUTPATIENT)
Dept: NEUROLOGY | Facility: CLINIC | Age: 74
End: 2024-04-05
Payer: MEDICARE

## 2024-04-05 PROCEDURE — 96133 NRPSYC TST EVAL PHYS/QHP EA: CPT

## 2024-04-11 NOTE — H&P ADULT - NEGATIVE SKIN SYMPTOMS
no rash/no itching [Dyspnea on exertion] : dyspnea during exertion [Lower Ext Edema] : lower extremity edema [Palpitations] : palpitations [Joint Pain] : joint pain [Joint Stiffness] : joint stiffness [Chest Discomfort] : no chest discomfort [Orthopnea] : no orthopnea [PND] : no PND [Syncope] : no syncope [Negative] : Gastrointestinal [FreeTextEntry5] : As per HPI

## 2024-04-17 ENCOUNTER — APPOINTMENT (OUTPATIENT)
Dept: HEART FAILURE | Facility: CLINIC | Age: 74
End: 2024-04-17
Payer: MEDICARE

## 2024-04-17 ENCOUNTER — NON-APPOINTMENT (OUTPATIENT)
Age: 74
End: 2024-04-17

## 2024-04-17 VITALS
WEIGHT: 172.6 LBS | DIASTOLIC BLOOD PRESSURE: 64 MMHG | SYSTOLIC BLOOD PRESSURE: 94 MMHG | HEART RATE: 59 BPM | OXYGEN SATURATION: 95 % | HEIGHT: 64 IN | BODY MASS INDEX: 29.47 KG/M2

## 2024-04-17 DIAGNOSIS — I63.9 CEREBRAL INFARCTION, UNSPECIFIED: ICD-10-CM

## 2024-04-17 PROCEDURE — G2211 COMPLEX E/M VISIT ADD ON: CPT

## 2024-04-17 PROCEDURE — 99214 OFFICE O/P EST MOD 30 MIN: CPT

## 2024-04-17 PROCEDURE — 93000 ELECTROCARDIOGRAM COMPLETE: CPT

## 2024-04-17 RX ORDER — HYDRALAZINE HYDROCHLORIDE 25 MG/1
25 TABLET ORAL
Qty: 270 | Refills: 3 | Status: DISCONTINUED | COMMUNITY
Start: 2022-10-19 | End: 2024-04-17

## 2024-04-17 RX ORDER — SACUBITRIL AND VALSARTAN 49; 51 MG/1; MG/1
49-51 TABLET, FILM COATED ORAL TWICE DAILY
Qty: 60 | Refills: 5 | Status: ACTIVE | COMMUNITY
Start: 2023-05-08 | End: 1900-01-01

## 2024-04-17 NOTE — PHYSICAL EXAM
[Well Developed] : well developed [Well Nourished] : well nourished [No Acute Distress] : no acute distress [Normal S1, S2] : normal S1, S2 [No Murmur] : no murmur [No Rub] : no rub [No Gallop] : no gallop [No Respiratory Distress] : no respiratory distress  [Soft] : abdomen soft [Non Tender] : non-tender [Normal Radial B/L] : normal radial B/L [Moves all extremities] : moves all extremities [No Focal Deficits] : no focal deficits [Normal Speech] : normal speech [Alert and Oriented] : alert and oriented [Normal memory] : normal memory [Clear Lung Fields] : clear lung fields [No Edema] : no edema [de-identified] : JVP 8 cm H2O [de-identified] : Left chest ICD  [de-identified] : left  base crackles [de-identified] : abdominal hernia [de-identified] : uses a cane [de-identified] : Warm peripherally

## 2024-04-17 NOTE — ASSESSMENT
[FreeTextEntry1] : Mr. Torrez is a 75 y/o M with a history of ACC/AHA Stage C NICM/HFrEF (LV 6.6 cm, LVEF 30-35%) c/b prior cardiac arrest (12/22) s/p ICD (3/23), HTN, and CKD III (baseline Cr 1.6) presenting to HF clinic for further management.Currently ACC stage C, NYHA class II and appears euvolemic and low normotensive.   1, ACC/AHA Stage C NICM - - Etiology: suspect hypertensive cardiomyopathy, no signs of infiltrative disease on cMRI - change torsemide to 20 mg QOD from aily with additional 20 mg as needed for weight gain of 2-3 lbs in 2-3 days with goal weight 168-170 lbs or less - continue marco 12.5 mg daily - continue Coreg 25 mg BID - increase Entresto to 49-51 mg bid from  24-26 mg bid; last Cr 1.9; will recheck labs in 2 weeks at [facility] on increased Entresto  - on HDZN 25 mg TID; discontinue with escalation of Entresto - Continue Jardiance 10 mg daily, goal weight 168-170 pounds  -  follow up with PCP Dr. Gay  2 CKD - stage 3b - will monitor, recheck labs today in office  3 HTN - controlled - controlling with GDMT as above  4. ? TIA 12/22 - carotid dopplers with no hemodynamically significant stenosis - continue statin  RTC in 2 months with me

## 2024-04-17 NOTE — CARDIOLOGY SUMMARY
[de-identified] : 4/17/24 Sinus bradycardia 59, NSST with PVC 2/16/24 NSR 62, LVH, NSST 10/18/23 NSR 69, NSST 7/19/23 NSR 68, LVH, NSST 4/19/23 unchanged 3/15/23 NSR 66, LVH, NSST 2/22/23 NSR 76, LVH, NSST 10/19/22 - NSR, LVH, LAE 6/10/22 NSR 62, LVH with strain pattern 9/21/20: SR, LVH with strain pattern   [de-identified] : \par  Holter 9/2020: 5% PVC burden, HR  bpm, no NSVT\par   [de-identified] : \par  2/23/23 TTE LVEF 31%, mild MR, severe LV systolic dysfunction, mild diastolic dysfunction stage 1, normal LA, RA, RV\par  \par  TTE 6/29/2021: LV 5.4 cm, LVEF 35-40% with mild global hypokinesis, mild-moderate MR, mild AI, normal RV size/function, IVC small/collapsing, estimated PASP 18 mmHg\par  \par  TTE 9/3/20: LV 6.6 cm, mild concentric LVH, LVEF 20-25%, LVOT VTI 12 cm, restrictive diastolic filling with E/A 3.3 and e' velocities in 3-5 range, normal RV size/function, severe mitral regurgitation, mild TR, mild-moderate AI\par   [de-identified] : \par  Cardiac MRI 11/2020:  ml with elevated LV mas index of 95 g/m2, LVEF 22%, normal RV size/function, linear mesocardial LGE along septum and anterior wall c/w nonischemic cardiomyopathy  [de-identified] : \par  [facility] 3/2020: minimal non-obstructive CAD\par  RH 3/2020: RA 1, PA 43/14 (27), PCWP 18mmHg, LVEDP 15 mmHg, Lissa CO/CI 3.8/2.2\par

## 2024-04-17 NOTE — HISTORY OF PRESENT ILLNESS
[FreeTextEntry1] : Mr. Torrez is a 73 y/o M with a history of ACC/AHA Stage C NICM/HFrEF (LV 6.6 cm, LVEF 30-35%) c/b prior cardiac arrest (12/22) s/p ICD (3/23), HTN, and CKD III (baseline Cr 1.6) presenting to HF clinic for further management. Referred by Dr. Riddle. Previously followed by Dr. Sparks.   For full initial details please refer to note from 6/10/22.   Since last visit, he has been feeling better. States he has stopped drinking soda, beer and sugar. Adhering to less than 2 liters of fluid per day some days.    His niece takes care of his medications. His home weight has gone down to 167 lbs from 177 lbs last visit in office with goal < 166 lbs. He has cut down on eating rice. Home B/P 112 range.   Currently, states he can walk > 20 blocks with a cane without dyspnea.  He can climb 4 flights of stairs.  He sleeps with 1 pillow with no orthopnea/PND.   Previously reported that he may have had a TIA when he was admitted in December 2022 at [facility]. Last admission at [facility] 2/22/23-3/1/23 with ADHF and had ICD implanted for possible secondary prevention after cardiac arrest in 12/22.   He has some dizziness/LH with bending head down. He denies chest pain, palpitations, syncope, seizure and no ICD shocks.   He lives with his niece Maddy, and she is managing his medications.   Has been followed by neurology for short-term memory issues and history of possible seizure in 12/22 (during hospitalization for cardiac arrest). Had a PET brain scan 8/16/23 which was suggestive of possible Alzheimer's or mixed dementia.   Has received Covid vaccines (hasn't received bivalent vaccine). Reports he had Covid at the time of his appendix surgery 1/22. Hasn't received flu vaccine.

## 2024-04-22 ENCOUNTER — OUTPATIENT (OUTPATIENT)
Dept: OUTPATIENT SERVICES | Facility: HOSPITAL | Age: 74
LOS: 1 days | End: 2024-04-22

## 2024-04-22 ENCOUNTER — APPOINTMENT (OUTPATIENT)
Dept: INTERNAL MEDICINE | Facility: CLINIC | Age: 74
End: 2024-04-22
Payer: MEDICARE

## 2024-04-22 VITALS
SYSTOLIC BLOOD PRESSURE: 99 MMHG | HEIGHT: 64 IN | BODY MASS INDEX: 29.19 KG/M2 | WEIGHT: 171 LBS | OXYGEN SATURATION: 99 % | HEART RATE: 66 BPM | DIASTOLIC BLOOD PRESSURE: 67 MMHG

## 2024-04-22 DIAGNOSIS — E11.8 TYPE 2 DIABETES MELLITUS WITH UNSPECIFIED COMPLICATIONS: ICD-10-CM

## 2024-04-22 DIAGNOSIS — N18.30 CHRONIC KIDNEY DISEASE, STAGE 3 UNSPECIFIED: ICD-10-CM

## 2024-04-22 DIAGNOSIS — I10 ESSENTIAL (PRIMARY) HYPERTENSION: ICD-10-CM

## 2024-04-22 DIAGNOSIS — I50.9 HEART FAILURE, UNSPECIFIED: ICD-10-CM

## 2024-04-22 PROCEDURE — 99214 OFFICE O/P EST MOD 30 MIN: CPT

## 2024-04-24 ENCOUNTER — NON-APPOINTMENT (OUTPATIENT)
Age: 74
End: 2024-04-24

## 2024-04-26 ENCOUNTER — NON-APPOINTMENT (OUTPATIENT)
Age: 74
End: 2024-04-26

## 2024-05-01 LAB
ALBUMIN SERPL ELPH-MCNC: 4.5 G/DL
ALP BLD-CCNC: 74 U/L
ALT SERPL-CCNC: 10 U/L
ANION GAP SERPL CALC-SCNC: 12 MMOL/L
AST SERPL-CCNC: 15 U/L
BILIRUB SERPL-MCNC: 0.5 MG/DL
BUN SERPL-MCNC: 16 MG/DL
CALCIUM SERPL-MCNC: 9.3 MG/DL
CHLORIDE SERPL-SCNC: 104 MMOL/L
CHOLEST SERPL-MCNC: 88 MG/DL
CO2 SERPL-SCNC: 23 MMOL/L
CREAT SERPL-MCNC: 2.09 MG/DL
CREAT SPEC-SCNC: 69 MG/DL
EGFR: 32 ML/MIN/1.73M2
ESTIMATED AVERAGE GLUCOSE: 160 MG/DL
GLUCOSE SERPL-MCNC: 99 MG/DL
HBA1C MFR BLD HPLC: 7.2 %
HDLC SERPL-MCNC: 30 MG/DL
LDLC SERPL CALC-MCNC: 30 MG/DL
MICROALBUMIN 24H UR DL<=1MG/L-MCNC: <1.2 MG/DL
MICROALBUMIN/CREAT 24H UR-RTO: NORMAL MG/G
NONHDLC SERPL-MCNC: 57 MG/DL
NT-PROBNP SERPL-MCNC: 89 PG/ML
POTASSIUM SERPL-SCNC: 4.1 MMOL/L
PROT SERPL-MCNC: 7.6 G/DL
SODIUM SERPL-SCNC: 139 MMOL/L
TRIGL SERPL-MCNC: 167 MG/DL
VIT B12 SERPL-MCNC: 430 PG/ML

## 2024-05-07 ENCOUNTER — APPOINTMENT (OUTPATIENT)
Dept: NEUROLOGY | Facility: CLINIC | Age: 74
End: 2024-05-07
Payer: MEDICARE

## 2024-05-07 VITALS
HEIGHT: 64 IN | SYSTOLIC BLOOD PRESSURE: 93 MMHG | WEIGHT: 171 LBS | BODY MASS INDEX: 29.19 KG/M2 | HEART RATE: 56 BPM | DIASTOLIC BLOOD PRESSURE: 58 MMHG

## 2024-05-07 PROCEDURE — 99214 OFFICE O/P EST MOD 30 MIN: CPT

## 2024-05-08 PROBLEM — N18.30 CKD (CHRONIC KIDNEY DISEASE), STAGE III: Status: ACTIVE | Noted: 2020-10-28

## 2024-05-08 PROBLEM — I10 HTN (HYPERTENSION): Status: ACTIVE | Noted: 2020-09-02

## 2024-05-08 PROBLEM — E11.8 TYPE 2 DIABETES MELLITUS WITH COMPLICATION: Status: ACTIVE | Noted: 2024-01-08

## 2024-05-08 NOTE — HISTORY OF PRESENT ILLNESS
[FreeTextEntry1] : follow up on chronic medical conditions.  [de-identified] : SAINTONGE JACQUES is a 72 year old male with CHF s/p pacemaker (managed by cardiology), CKD stage 3, HTN, Dm2, Seizure disorder here for mgmt of chronic conditions. Mr. CHRISTINE feels well and has no new concerns. Pt reports compliance with medications without any new side effects. Seen by cardiology, and neuro in the intrim since last visit.  Due to see nephrology. in the intrim - entresto increased, torsemide decreased, hydralazine stopped. labs ordered by cardiology but pt wants to do it today - will reorder.
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
## 2024-05-08 NOTE — SOCIAL HISTORY
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

## 2024-05-08 NOTE — DISCUSSION/SUMMARY
[FreeTextEntry1] : SAINTONGE JACQUES is a 74 year with a complex medical history including CHF, cardiac arrest, seizure, stroke, psychosis with a cognitive decline since 2008. History limited due to patients cognitive impairment and progressive time course unclear.  CT Brain (08/16/2023): showed left parieto-occipital chronic infarct & T chronic lacunar infarct in the left caudate nucleus & with white matter disease. PET Brain (08/16/2023): showed decreased uptake in parietotemporal regions, left > right, & in the in the precuneus, anteromedial temporal region    rEEG (08/02/2023) showed focal slowing  in the left temporal region. Neuropsych testing showed significant impairments in memory, executive functioning and processing speed.  Patient impairments in IaAdL's and neuropsych profile consistent with dementia in the mild-moderate range. Time course and pattern not consistent with a  neurodegenerative disease alone but may be a part of a mixed picture that may include possible anoxic brain injury, cerebrovascular disease, prior stroke and a neurodegenerative disease.                                                  PLAN:  -Advanced Directives: defer Major neurocognitive disorder - Would not recommend further neuroimaging as it would not . Even if patient has Alzheimer disease the risks of cholinesterase inhibitor with CHF out-weight the benefits and patient would not be eligible for a trial or new AD meds. -  Follow clinically with in office cognitive screening tests 6-12 months or prn -  Obtain collateral with niece. Left message with niece to discuss diagnosis and plan Seizures -Consider tapering off Keppra in future Follow-up: 6 months

## 2024-05-08 NOTE — PHYSICAL EXAM
[de-identified] :  no acute distress, well nourished and well developed. Eyes:  normal sclera/conjunctiva, pupils equal round and reactive to light and extraocular movements intact. Pulmonary:  no respiratory distress, no accessory muscle use, lungs were clear to auscultation bilaterally. Cardiac:  normal rate, with a regular rhythm, normal S1 and S2 and no murmur heard. Vascular: the pedal pulses are present, there was no peripheral edema and no extremity clubbing/cyanosis. Abdomen:  abdomen soft, non-tender, non-distended and normal bowel sounds. Neurology:  coordination grossly intact and no focal deficits. Psychiatric:  the affect was normal and insight and judgment were intact.

## 2024-05-09 ENCOUNTER — NON-APPOINTMENT (OUTPATIENT)
Age: 74
End: 2024-05-09

## 2024-05-09 ENCOUNTER — APPOINTMENT (OUTPATIENT)
Dept: ELECTROPHYSIOLOGY | Facility: CLINIC | Age: 74
End: 2024-05-09
Payer: MEDICARE

## 2024-05-09 VITALS — DIASTOLIC BLOOD PRESSURE: 53 MMHG | HEART RATE: 64 BPM | SYSTOLIC BLOOD PRESSURE: 89 MMHG

## 2024-05-09 DIAGNOSIS — I50.9 HEART FAILURE, UNSPECIFIED: ICD-10-CM

## 2024-05-09 PROCEDURE — 93282 PRGRMG EVAL IMPLANTABLE DFB: CPT

## 2024-06-14 ENCOUNTER — OUTPATIENT (OUTPATIENT)
Dept: OUTPATIENT SERVICES | Facility: HOSPITAL | Age: 74
LOS: 1 days | Discharge: HOME | End: 2024-06-14
Payer: MEDICARE

## 2024-06-14 PROCEDURE — 90792 PSYCH DIAG EVAL W/MED SRVCS: CPT

## 2024-06-17 DIAGNOSIS — F22 DELUSIONAL DISORDERS: ICD-10-CM

## 2024-06-17 DIAGNOSIS — F03.A2: ICD-10-CM

## 2024-06-26 RX ORDER — TORSEMIDE 20 MG/1
20 TABLET ORAL
Qty: 180 | Refills: 1 | Status: ACTIVE | COMMUNITY
Start: 2024-02-16 | End: 1900-01-01

## 2024-06-27 ENCOUNTER — RX RENEWAL (OUTPATIENT)
Age: 74
End: 2024-06-27

## 2024-07-03 ENCOUNTER — NON-APPOINTMENT (OUTPATIENT)
Age: 74
End: 2024-07-03

## 2024-07-03 ENCOUNTER — APPOINTMENT (OUTPATIENT)
Dept: HEART FAILURE | Facility: CLINIC | Age: 74
End: 2024-07-03
Payer: MEDICARE

## 2024-07-03 VITALS
HEART RATE: 67 BPM | OXYGEN SATURATION: 98 % | HEIGHT: 64 IN | SYSTOLIC BLOOD PRESSURE: 99 MMHG | DIASTOLIC BLOOD PRESSURE: 66 MMHG | WEIGHT: 172.1 LBS | BODY MASS INDEX: 29.38 KG/M2

## 2024-07-03 DIAGNOSIS — I50.9 HEART FAILURE, UNSPECIFIED: ICD-10-CM

## 2024-07-03 PROCEDURE — 99214 OFFICE O/P EST MOD 30 MIN: CPT

## 2024-07-03 PROCEDURE — 93000 ELECTROCARDIOGRAM COMPLETE: CPT

## 2024-07-03 PROCEDURE — G2211 COMPLEX E/M VISIT ADD ON: CPT

## 2024-07-31 ENCOUNTER — RX RENEWAL (OUTPATIENT)
Age: 74
End: 2024-07-31

## 2024-07-31 RX ORDER — ASPIRIN 81 MG/1
81 TABLET, COATED ORAL
Qty: 90 | Refills: 2 | Status: ACTIVE | COMMUNITY
Start: 2024-07-31 | End: 1900-01-01

## 2024-08-07 ENCOUNTER — NON-APPOINTMENT (OUTPATIENT)
Age: 74
End: 2024-08-07

## 2024-08-08 ENCOUNTER — APPOINTMENT (OUTPATIENT)
Dept: ELECTROPHYSIOLOGY | Facility: CLINIC | Age: 74
End: 2024-08-08

## 2024-08-08 PROCEDURE — 93296 REM INTERROG EVL PM/IDS: CPT

## 2024-08-08 PROCEDURE — 93295 DEV INTERROG REMOTE 1/2/MLT: CPT

## 2024-08-27 ENCOUNTER — OUTPATIENT (OUTPATIENT)
Dept: OUTPATIENT SERVICES | Facility: HOSPITAL | Age: 74
LOS: 1 days | End: 2024-08-27
Payer: MEDICARE

## 2024-08-27 ENCOUNTER — APPOINTMENT (OUTPATIENT)
Dept: CV DIAGNOSITCS | Facility: HOSPITAL | Age: 74
End: 2024-08-27

## 2024-08-27 ENCOUNTER — NON-APPOINTMENT (OUTPATIENT)
Age: 74
End: 2024-08-27

## 2024-08-27 ENCOUNTER — RESULT REVIEW (OUTPATIENT)
Age: 74
End: 2024-08-27

## 2024-08-27 DIAGNOSIS — I50.9 HEART FAILURE, UNSPECIFIED: ICD-10-CM

## 2024-08-27 PROCEDURE — 76376 3D RENDER W/INTRP POSTPROCES: CPT | Mod: 26

## 2024-08-27 PROCEDURE — 93306 TTE W/DOPPLER COMPLETE: CPT | Mod: 26

## 2024-08-27 PROCEDURE — 93356 MYOCRD STRAIN IMG SPCKL TRCK: CPT

## 2024-08-29 NOTE — PATIENT PROFILE ADULT - HOW PATIENT ADDRESSED, PROFILE
Well Adult Note  Name: Kylie Kimble Today’s Date: 2024   MRN: [id number] Sex: Female   Age: 36 y.o. Ethnicity: Non- / Non    : 1988 Race: White (non-)      Kylie Kimble is here for a well adult exam.       Subjective   History:  Wellness exam - Patient presents today for annual physical. Patient  reports feeling well. Patient has a normal appetite. Patient  eats 5+ servings of vegetables/fruits each day. Patient prepares food at home multiple times/day, eats in restaurants rarely. Patient  states that she sleeps well and gets 7-8 hours of sleep on average. In regards to emotional health, patient  denies depression or anxiety. Libido is considered to be normal. In regards to bowel habits, patient  has no complaints. Regarding urination, no complaints. Patient reports they feels safe at home, uses seat belts and has smoke detectors. Patient has a good work-life balance, and is happy with her job.     Health Maintenance    Annual retinal eye exam - Patient due  will need to schedule   Annual Dentist visit - 3/2024  Tobacco smoking -  NO  Alcohol Misuse -  NO  Illicit Drug Use- NO  Healthy Diet and physical activity -  YES  Obesity Screen- screened   Wears seat belt-  YES  End of life directives discussed with patient.-Mentions she does not have  a will/or/an advanced directives. Was instructed to start process looking into preparing her will an advanced directives.     Review of Systems   Constitutional:  Negative for activity change, appetite change, chills, fatigue, fever and unexpected weight change.   HENT:  Negative for congestion, ear pain, postnasal drip, sinus pressure, sore throat, tinnitus and trouble swallowing.    Eyes:  Negative for pain and redness.   Respiratory:  Negative for cough, chest tightness, shortness of breath and wheezing.    Cardiovascular:  Negative for chest pain, palpitations and leg swelling.   Gastrointestinal:  Negative for abdominal distention,  Mr tom

## 2024-09-16 ENCOUNTER — RX RENEWAL (OUTPATIENT)
Age: 74
End: 2024-09-16

## 2024-09-30 NOTE — ED ADULT NURSE NOTE - CHIEF COMPLAINT
Department of Anesthesiology  Postprocedure Note    Patient: Kyle Guzman  MRN: [id number]  YOB: 1972  Date of evaluation: 9/30/2024    Procedure Summary       Date: 09/30/24 Room / Location: [address]    Anesthesia Start: 1208 Anesthesia Stop: 1235    Procedure: BRONCHOSCOPY (Bronchus) Diagnosis:       Respiratory abnormality      (Respiratory abnormality [R06.9])    Surgeons: Drew Overton MD Responsible Provider: Joel Kumar MD    Anesthesia Type: general ASA Status: 3            Anesthesia Type: No value filed.    Lona Phase I: Lona Score: 10    Lona Phase II:      Anesthesia Post Evaluation    Airway patency: patent  Cardiovascular status: hemodynamically stable  Respiratory status: acceptable    No notable events documented.  
The patient is a 68y Male complaining of

## 2024-10-07 ENCOUNTER — APPOINTMENT (OUTPATIENT)
Dept: INTERNAL MEDICINE | Facility: CLINIC | Age: 74
End: 2024-10-07
Payer: MEDICARE

## 2024-10-07 ENCOUNTER — OUTPATIENT (OUTPATIENT)
Dept: OUTPATIENT SERVICES | Facility: HOSPITAL | Age: 74
LOS: 1 days | End: 2024-10-07
Payer: COMMERCIAL

## 2024-10-07 VITALS
HEART RATE: 61 BPM | DIASTOLIC BLOOD PRESSURE: 60 MMHG | BODY MASS INDEX: 30.39 KG/M2 | SYSTOLIC BLOOD PRESSURE: 80 MMHG | WEIGHT: 178 LBS | HEIGHT: 64 IN

## 2024-10-07 DIAGNOSIS — E11.8 TYPE 2 DIABETES MELLITUS WITH UNSPECIFIED COMPLICATIONS: ICD-10-CM

## 2024-10-07 DIAGNOSIS — G40.909 EPILEPSY, UNSPECIFIED, NOT INTRACTABLE, W/OUT STATUS EPILEPTICUS: ICD-10-CM

## 2024-10-07 DIAGNOSIS — G30.1 ALZHEIMER'S DISEASE WITH LATE ONSET: ICD-10-CM

## 2024-10-07 DIAGNOSIS — N18.30 CHRONIC KIDNEY DISEASE, STAGE 3 UNSPECIFIED: ICD-10-CM

## 2024-10-07 DIAGNOSIS — I10 ESSENTIAL (PRIMARY) HYPERTENSION: ICD-10-CM

## 2024-10-07 DIAGNOSIS — F02.818 ALZHEIMER'S DISEASE WITH LATE ONSET: ICD-10-CM

## 2024-10-07 DIAGNOSIS — I63.9 CEREBRAL INFARCTION, UNSPECIFIED: ICD-10-CM

## 2024-10-07 DIAGNOSIS — E55.9 VITAMIN D DEFICIENCY, UNSPECIFIED: ICD-10-CM

## 2024-10-07 DIAGNOSIS — I50.9 HEART FAILURE, UNSPECIFIED: ICD-10-CM

## 2024-10-07 PROCEDURE — 99214 OFFICE O/P EST MOD 30 MIN: CPT

## 2024-10-07 PROCEDURE — G0463: CPT

## 2024-10-07 PROCEDURE — G2211 COMPLEX E/M VISIT ADD ON: CPT

## 2024-10-09 LAB
25(OH)D3 SERPL-MCNC: 24.8 NG/ML
ALBUMIN SERPL ELPH-MCNC: 4.4 G/DL
ALP BLD-CCNC: 78 U/L
ALT SERPL-CCNC: 11 U/L
ANION GAP SERPL CALC-SCNC: 12 MMOL/L
AST SERPL-CCNC: 22 U/L
BASOPHILS # BLD AUTO: 0.06 K/UL
BASOPHILS NFR BLD AUTO: 1.1 %
BILIRUB SERPL-MCNC: 0.8 MG/DL
BUN SERPL-MCNC: 13 MG/DL
CALCIUM SERPL-MCNC: 9.5 MG/DL
CHLORIDE SERPL-SCNC: 106 MMOL/L
CHOLEST SERPL-MCNC: 85 MG/DL
CO2 SERPL-SCNC: 23 MMOL/L
CREAT SERPL-MCNC: 1.86 MG/DL
EGFR: 37 ML/MIN/1.73M2
EOSINOPHIL # BLD AUTO: 0.11 K/UL
EOSINOPHIL NFR BLD AUTO: 2.1 %
ESTIMATED AVERAGE GLUCOSE: 146 MG/DL
GLUCOSE SERPL-MCNC: 109 MG/DL
HBA1C MFR BLD HPLC: 6.7 %
HCT VFR BLD CALC: 42.6 %
HDLC SERPL-MCNC: 32 MG/DL
HGB BLD-MCNC: 13.9 G/DL
IMM GRANULOCYTES NFR BLD AUTO: 0.4 %
LDLC SERPL CALC-MCNC: 26 MG/DL
LYMPHOCYTES # BLD AUTO: 2.27 K/UL
LYMPHOCYTES NFR BLD AUTO: 42.9 %
MAN DIFF?: NORMAL
MCHC RBC-ENTMCNC: 30 PG
MCHC RBC-ENTMCNC: 32.6 GM/DL
MCV RBC AUTO: 91.8 FL
MONOCYTES # BLD AUTO: 0.44 K/UL
MONOCYTES NFR BLD AUTO: 8.3 %
NEUTROPHILS # BLD AUTO: 2.39 K/UL
NEUTROPHILS NFR BLD AUTO: 45.2 %
NONHDLC SERPL-MCNC: 54 MG/DL
PLATELET # BLD AUTO: 188 K/UL
POTASSIUM SERPL-SCNC: 4.2 MMOL/L
PROT SERPL-MCNC: 7.7 G/DL
RBC # BLD: 4.64 M/UL
RBC # FLD: 13.4 %
SODIUM SERPL-SCNC: 142 MMOL/L
TRIGL SERPL-MCNC: 165 MG/DL
TSH SERPL-ACNC: 1.39 UIU/ML
WBC # FLD AUTO: 5.29 K/UL

## 2024-10-09 RX ORDER — COLD-HOT PACK
125 MCG EACH MISCELLANEOUS
Qty: 90 | Refills: 3 | Status: ACTIVE | COMMUNITY
Start: 2024-10-09 | End: 1900-01-01

## 2024-10-15 DIAGNOSIS — G40.909 EPILEPSY, UNSPECIFIED, NOT INTRACTABLE, WITHOUT STATUS EPILEPTICUS: ICD-10-CM

## 2024-10-15 DIAGNOSIS — F02.818 DEMENTIA IN OTHER DISEASES CLASSIFIED ELSEWHERE, UNSPECIFIED SEVERITY, WITH OTHER BEHAVIORAL DISTURBANCE: ICD-10-CM

## 2024-10-15 DIAGNOSIS — E55.9 VITAMIN D DEFICIENCY, UNSPECIFIED: ICD-10-CM

## 2024-10-15 DIAGNOSIS — N18.30 CHRONIC KIDNEY DISEASE, STAGE 3 UNSPECIFIED: ICD-10-CM

## 2024-10-15 DIAGNOSIS — I63.9 CEREBRAL INFARCTION, UNSPECIFIED: ICD-10-CM

## 2024-10-15 DIAGNOSIS — E11.8 TYPE 2 DIABETES MELLITUS WITH UNSPECIFIED COMPLICATIONS: ICD-10-CM

## 2024-10-15 DIAGNOSIS — I50.9 HEART FAILURE, UNSPECIFIED: ICD-10-CM

## 2024-10-15 DIAGNOSIS — G30.1 ALZHEIMER'S DISEASE WITH LATE ONSET: ICD-10-CM

## 2024-10-23 ENCOUNTER — NON-APPOINTMENT (OUTPATIENT)
Age: 74
End: 2024-10-23

## 2024-10-29 ENCOUNTER — APPOINTMENT (OUTPATIENT)
Dept: NEUROLOGY | Facility: CLINIC | Age: 74
End: 2024-10-29

## 2024-11-07 ENCOUNTER — NON-APPOINTMENT (OUTPATIENT)
Age: 74
End: 2024-11-07

## 2024-11-07 ENCOUNTER — APPOINTMENT (OUTPATIENT)
Dept: ELECTROPHYSIOLOGY | Facility: CLINIC | Age: 74
End: 2024-11-07
Payer: MEDICARE

## 2024-11-07 PROCEDURE — 93295 DEV INTERROG REMOTE 1/2/MLT: CPT

## 2024-11-07 PROCEDURE — 93296 REM INTERROG EVL PM/IDS: CPT

## 2024-11-11 ENCOUNTER — APPOINTMENT (OUTPATIENT)
Dept: INTERNAL MEDICINE | Facility: CLINIC | Age: 74
End: 2024-11-11

## 2024-11-11 ENCOUNTER — OUTPATIENT (OUTPATIENT)
Dept: OUTPATIENT SERVICES | Facility: HOSPITAL | Age: 74
LOS: 1 days | End: 2024-11-11

## 2024-11-11 VITALS
WEIGHT: 175 LBS | OXYGEN SATURATION: 96 % | HEART RATE: 66 BPM | BODY MASS INDEX: 30.04 KG/M2 | DIASTOLIC BLOOD PRESSURE: 65 MMHG | SYSTOLIC BLOOD PRESSURE: 88 MMHG

## 2024-11-11 DIAGNOSIS — I10 ESSENTIAL (PRIMARY) HYPERTENSION: ICD-10-CM

## 2024-11-11 PROCEDURE — G0463: CPT

## 2024-11-11 PROCEDURE — G2211 COMPLEX E/M VISIT ADD ON: CPT

## 2024-11-11 PROCEDURE — 99213 OFFICE O/P EST LOW 20 MIN: CPT

## 2024-11-25 ENCOUNTER — OUTPATIENT (OUTPATIENT)
Dept: OUTPATIENT SERVICES | Facility: HOSPITAL | Age: 74
LOS: 1 days | End: 2024-11-25

## 2024-11-25 ENCOUNTER — APPOINTMENT (OUTPATIENT)
Dept: INTERNAL MEDICINE | Facility: CLINIC | Age: 74
End: 2024-11-25

## 2024-11-25 VITALS
WEIGHT: 172 LBS | OXYGEN SATURATION: 97 % | HEART RATE: 67 BPM | BODY MASS INDEX: 29.52 KG/M2 | SYSTOLIC BLOOD PRESSURE: 100 MMHG | DIASTOLIC BLOOD PRESSURE: 60 MMHG

## 2024-11-25 DIAGNOSIS — I10 ESSENTIAL (PRIMARY) HYPERTENSION: ICD-10-CM

## 2024-11-25 PROCEDURE — G2211 COMPLEX E/M VISIT ADD ON: CPT

## 2024-11-25 PROCEDURE — 99213 OFFICE O/P EST LOW 20 MIN: CPT

## 2024-11-25 PROCEDURE — G0463: CPT

## 2025-01-08 ENCOUNTER — APPOINTMENT (OUTPATIENT)
Dept: HEART FAILURE | Facility: CLINIC | Age: 75
End: 2025-01-08
Payer: MEDICARE

## 2025-01-08 ENCOUNTER — NON-APPOINTMENT (OUTPATIENT)
Age: 75
End: 2025-01-08

## 2025-01-08 VITALS
SYSTOLIC BLOOD PRESSURE: 108 MMHG | OXYGEN SATURATION: 98 % | DIASTOLIC BLOOD PRESSURE: 68 MMHG | TEMPERATURE: 97.6 F | HEART RATE: 61 BPM

## 2025-01-08 VITALS — WEIGHT: 170 LBS | BODY MASS INDEX: 29.02 KG/M2 | HEIGHT: 64 IN

## 2025-01-08 PROCEDURE — 93000 ELECTROCARDIOGRAM COMPLETE: CPT

## 2025-01-08 PROCEDURE — 99214 OFFICE O/P EST MOD 30 MIN: CPT | Mod: 25

## 2025-01-13 ENCOUNTER — OUTPATIENT (OUTPATIENT)
Dept: OUTPATIENT SERVICES | Facility: HOSPITAL | Age: 75
LOS: 1 days | End: 2025-01-13

## 2025-01-13 ENCOUNTER — APPOINTMENT (OUTPATIENT)
Dept: INTERNAL MEDICINE | Facility: CLINIC | Age: 75
End: 2025-01-13

## 2025-01-13 VITALS
WEIGHT: 177 LBS | OXYGEN SATURATION: 98 % | HEART RATE: 70 BPM | SYSTOLIC BLOOD PRESSURE: 98 MMHG | HEIGHT: 64 IN | BODY MASS INDEX: 30.22 KG/M2 | DIASTOLIC BLOOD PRESSURE: 62 MMHG

## 2025-01-13 PROCEDURE — G2211 COMPLEX E/M VISIT ADD ON: CPT

## 2025-01-13 PROCEDURE — 99213 OFFICE O/P EST LOW 20 MIN: CPT

## 2025-01-15 DIAGNOSIS — I10 ESSENTIAL (PRIMARY) HYPERTENSION: ICD-10-CM

## 2025-01-15 NOTE — ED PROVIDER NOTE - WR ORDER STATUS 1
Physical Therapy    Visit Type: treatment  SUBJECTIVE  Patient seen on 11ST. Collaborated with DIMITRI Melvin.   \"I want to do stairs\"    Patient / Family Goal: return home    Pain   Patient reports pain is not an issue/concern.     OBJECTIVE      Vitals:  Blood Pressure (mmhg): 117/74    Patient Activity Tolerance: 1 to 1 activity to rest         Bed Mobility  - Supine to sit: modified independent  head of bed slightly elevated, use of railing, and exited to left side of bed    Transfers  Assistive devices: gait belt, 2-wheeled walker, 1 person  - Sit to stand: supervision  - Stand to sit: supervision  Cues for hand placement      Ambulation / Gait  - Assistive device: gait belt, two-wheeled walker and 1 person  - Distance (feet unless otherwise indicated): 30, 3  - Assist Level: supervision  - Surface: even  - Description: decreased leonardo/pace    cues for walker management and limited distance due to dizziness. Patient able to ambulate into crow but needed to sit due to dizziness. Dizziness improved with sitting but did not fully resolve, patient declined further ambulation.     Interventions     Training provided: activity tolerance, balance retraining, bed mobility training, body mechanics, functional ambulation, transfer training, gait training, safety training and use of assistive device  Education completed regarding role of acute PT, importance of mobility, lumbar precautions, and assistive device use    Skilled input: Verbal instruction/cues  Verbal Consent: Writer verbally educated and received verbal consent for hand placement, positioning of patient, and techniques to be performed today from patient for clothing adjustments for techniques, hand placement and palpation for techniques and therapist position for techniques as described above and how they are pertinent to the patient's plan of care.         Education:   - Present and ready to learn: patient  Education provided during session:  - Results of  above outlined education: Verbalizes understanding    ASSESSMENT   Progress: progressing toward goals  Interfering components: decreased activity tolerance    Discharge needs based on today's assessment:   - Current level of function: slightly below baseline level of function   - Therapy needs at discharge: does not require ongoing therapy   - Activities of daily living (ADLs) requiring support at discharge: transfers, ambulation and stairs   - Instrumental activities of daily living (IADLs) requiring support at discharge: community mobility   - Impairments that require further therapy intervention: activity tolerance, balance, pain, strength and safety awareness    AM-PAC  - Generalized Prior Level of Function: IND/MOD I ([facility] 22-24)       Key: MOD A=moderate assistance, IND/MOD I=independent/modified independent  - Generalized Current Level of Function     - Current Mobility Score: 20       AM-PAC Scoring Key= >21 Modified Independent; 20-21 Supervision; 18-19 Minimal assist; 13-18 Moderate assist; 9-12 Max assist; <9 Total assist    Patient motivated to ambulate, but only able to ambulate short distance before reporting dizziness and needing to sit. Patient declining further ambulation. Educated on importance of mobility and sitting upright; patient verbalized understanding. Anticipate patient will be able to discharge home with 1 additional PT session to address stairs.       PLAN (while hospitalized)  Suggestions for next session as indicated: Progress bed mobility, transfers, ambulation, and stairs     PT Frequency: 3-5 x per week      PT/OT Mobility Equipment for Discharge: owns 4ww cont to assess      Agreement to plan and goals: patient agrees with goals and treatment plan        GOALS  Review Date: 1/22/2025  Long Term Goals: (to be met by time of discharge from hospital)  Sit to supine: Patient will complete sit to supine modified independent.  Supine to sit: Patient will complete supine to sit modified  independent.  Sit to stand: Patient will complete sit to stand transfer with 2-wheeled walker, modified independent.   Stand to sit: Patient will complete stand to sit transfer with 2-wheeled walker, modified independent.   Ambulation (even): Patient will ambulate on even surface for 100 feet with 2-wheeled walker, modified independent.   Stairs: Patient will ambulate 9 steps with modified independent.   Documented in the chart in the following areas: Assessment/Plan.      Patient at End of Session:   Location: in chair  Safety measures: call light within reach and lines intact  Handoff to: nurse      Therapy procedure time and total treatment time can be found documented on the Time Entry flowsheet   Resulted

## 2025-02-11 ENCOUNTER — NON-APPOINTMENT (OUTPATIENT)
Age: 75
End: 2025-02-11

## 2025-02-11 ENCOUNTER — APPOINTMENT (OUTPATIENT)
Dept: ELECTROPHYSIOLOGY | Facility: CLINIC | Age: 75
End: 2025-02-11
Payer: MEDICARE

## 2025-02-11 PROCEDURE — 93295 DEV INTERROG REMOTE 1/2/MLT: CPT

## 2025-02-11 PROCEDURE — 93296 REM INTERROG EVL PM/IDS: CPT

## 2025-02-24 ENCOUNTER — APPOINTMENT (OUTPATIENT)
Dept: INTERNAL MEDICINE | Facility: CLINIC | Age: 75
End: 2025-02-24

## 2025-02-24 ENCOUNTER — OUTPATIENT (OUTPATIENT)
Dept: OUTPATIENT SERVICES | Facility: HOSPITAL | Age: 75
LOS: 1 days | End: 2025-02-24

## 2025-02-24 VITALS
HEART RATE: 69 BPM | WEIGHT: 176 LBS | SYSTOLIC BLOOD PRESSURE: 100 MMHG | HEIGHT: 64 IN | DIASTOLIC BLOOD PRESSURE: 60 MMHG | OXYGEN SATURATION: 97 % | BODY MASS INDEX: 30.05 KG/M2

## 2025-02-24 DIAGNOSIS — I10 ESSENTIAL (PRIMARY) HYPERTENSION: ICD-10-CM

## 2025-02-24 PROCEDURE — 99214 OFFICE O/P EST MOD 30 MIN: CPT

## 2025-02-24 PROCEDURE — G0463: CPT

## 2025-02-24 PROCEDURE — G2211 COMPLEX E/M VISIT ADD ON: CPT

## 2025-02-25 ENCOUNTER — RX RENEWAL (OUTPATIENT)
Age: 75
End: 2025-02-25

## 2025-03-07 LAB
ALBUMIN SERPL ELPH-MCNC: 4.2 G/DL
ALP BLD-CCNC: 70 U/L
ALT SERPL-CCNC: 10 U/L
ANION GAP SERPL CALC-SCNC: 13 MMOL/L
AST SERPL-CCNC: 20 U/L
BILIRUB SERPL-MCNC: 0.6 MG/DL
BUN SERPL-MCNC: 15 MG/DL
CALCIUM SERPL-MCNC: 9.3 MG/DL
CHLORIDE SERPL-SCNC: 102 MMOL/L
CHOLEST SERPL-MCNC: 76 MG/DL
CO2 SERPL-SCNC: 21 MMOL/L
CREAT SERPL-MCNC: 2.25 MG/DL
CREAT SPEC-SCNC: 30 MG/DL
EGFR: 30 ML/MIN/1.73M2
ESTIMATED AVERAGE GLUCOSE: 151 MG/DL
GLUCOSE SERPL-MCNC: 137 MG/DL
HBA1C MFR BLD HPLC: 6.9 %
HDLC SERPL-MCNC: 35 MG/DL
LDLC SERPL CALC-MCNC: 19 MG/DL
MICROALBUMIN 24H UR DL<=1MG/L-MCNC: <1.2 MG/DL
MICROALBUMIN/CREAT 24H UR-RTO: NORMAL MG/G
NONHDLC SERPL-MCNC: 41 MG/DL
POTASSIUM SERPL-SCNC: 4.5 MMOL/L
PROT SERPL-MCNC: 7.4 G/DL
SODIUM SERPL-SCNC: 137 MMOL/L
TRIGL SERPL-MCNC: 120 MG/DL

## 2025-03-30 PROBLEM — E11.22 TYPE 2 DM WITH CKD STAGE 3 AND HYPERTENSION: Status: ACTIVE | Noted: 2025-03-30

## 2025-03-30 PROBLEM — E66.811 OBESITY (BMI 30.0-34.9): Status: ACTIVE | Noted: 2025-03-30

## 2025-03-30 PROBLEM — I42.0 DILATED CARDIOMYOPATHY: Status: ACTIVE | Noted: 2025-03-30

## 2025-03-30 PROBLEM — I51.89 GRADE I DIASTOLIC DYSFUNCTION: Status: ACTIVE | Noted: 2025-03-30

## 2025-03-30 PROBLEM — I77.1 TORTUOUS AORTA: Status: ACTIVE | Noted: 2025-03-30

## 2025-03-30 PROBLEM — Z86.73 HISTORY OF CEREBROVASCULAR ACCIDENT: Status: RESOLVED | Noted: 2023-04-19 | Resolved: 2025-03-30

## 2025-04-10 ENCOUNTER — RX RENEWAL (OUTPATIENT)
Age: 75
End: 2025-04-10

## 2025-04-24 ENCOUNTER — APPOINTMENT (OUTPATIENT)
Dept: NEPHROLOGY | Facility: CLINIC | Age: 75
End: 2025-04-24
Payer: MEDICARE

## 2025-04-24 VITALS
HEART RATE: 62 BPM | BODY MASS INDEX: 29.18 KG/M2 | SYSTOLIC BLOOD PRESSURE: 99 MMHG | DIASTOLIC BLOOD PRESSURE: 67 MMHG | WEIGHT: 170 LBS

## 2025-04-24 DIAGNOSIS — N18.30 CHRONIC KIDNEY DISEASE, STAGE 3 UNSPECIFIED: ICD-10-CM

## 2025-04-24 DIAGNOSIS — I10 ESSENTIAL (PRIMARY) HYPERTENSION: ICD-10-CM

## 2025-04-24 DIAGNOSIS — I50.9 HEART FAILURE, UNSPECIFIED: ICD-10-CM

## 2025-04-24 PROCEDURE — G2211 COMPLEX E/M VISIT ADD ON: CPT | Mod: GC

## 2025-04-24 PROCEDURE — 99204 OFFICE O/P NEW MOD 45 MIN: CPT | Mod: GC

## 2025-04-25 LAB
25(OH)D3 SERPL-MCNC: 74.1 NG/ML
ALBUMIN SERPL ELPH-MCNC: 4.4 G/DL
ANION GAP SERPL CALC-SCNC: 17 MMOL/L
APPEARANCE: CLEAR
BACTERIA: NEGATIVE /HPF
BASOPHILS # BLD AUTO: 0.06 K/UL
BASOPHILS NFR BLD AUTO: 1 %
BILIRUBIN URINE: NEGATIVE
BLOOD URINE: NEGATIVE
BUN SERPL-MCNC: 13 MG/DL
CALCIUM SERPL-MCNC: 9.5 MG/DL
CALCIUM SERPL-MCNC: 9.7 MG/DL
CAST: 0 /LPF
CHLORIDE SERPL-SCNC: 100 MMOL/L
CO2 SERPL-SCNC: 21 MMOL/L
COLOR: YELLOW
CREAT SERPL-MCNC: 2.26 MG/DL
CREAT SPEC-SCNC: 80 MG/DL
CREAT SPEC-SCNC: 80 MG/DL
CREAT/PROT UR: 0.1 RATIO
EGFRCR SERPLBLD CKD-EPI 2021: 29 ML/MIN/1.73M2
EOSINOPHIL # BLD AUTO: 0.1 K/UL
EOSINOPHIL NFR BLD AUTO: 1.7 %
EPITHELIAL CELLS: 0 /HPF
GLUCOSE QUALITATIVE U: >=1000 MG/DL
GLUCOSE SERPL-MCNC: 100 MG/DL
HCT VFR BLD CALC: 46.3 %
HGB BLD-MCNC: 15 G/DL
IMM GRANULOCYTES NFR BLD AUTO: 0.3 %
KETONES URINE: NEGATIVE MG/DL
LEUKOCYTE ESTERASE URINE: NEGATIVE
LYMPHOCYTES # BLD AUTO: 2.23 K/UL
LYMPHOCYTES NFR BLD AUTO: 37.2 %
MAN DIFF?: NORMAL
MCHC RBC-ENTMCNC: 30.9 PG
MCHC RBC-ENTMCNC: 32.4 G/DL
MCV RBC AUTO: 95.5 FL
MICROALBUMIN 24H UR DL<=1MG/L-MCNC: <1.2 MG/DL
MICROALBUMIN/CREAT 24H UR-RTO: NORMAL MG/G
MICROSCOPIC-UA: NORMAL
MONOCYTES # BLD AUTO: 0.55 K/UL
MONOCYTES NFR BLD AUTO: 9.2 %
NEUTROPHILS # BLD AUTO: 3.04 K/UL
NEUTROPHILS NFR BLD AUTO: 50.6 %
NITRITE URINE: NEGATIVE
PARATHYROID HORMONE INTACT: 75 PG/ML
PH URINE: 6
PHOSPHATE SERPL-MCNC: 3.4 MG/DL
PLATELET # BLD AUTO: 158 K/UL
POTASSIUM SERPL-SCNC: 5 MMOL/L
PROT UR-MCNC: 4 MG/DL
PROTEIN URINE: NEGATIVE MG/DL
RBC # BLD: 4.85 M/UL
RBC # FLD: 13.3 %
RED BLOOD CELLS URINE: 0 /HPF
SODIUM SERPL-SCNC: 138 MMOL/L
SPECIFIC GRAVITY URINE: 1.01
UROBILINOGEN URINE: 0.2 MG/DL
WBC # FLD AUTO: 6 K/UL
WHITE BLOOD CELLS URINE: 0 /HPF

## 2025-04-28 ENCOUNTER — NON-APPOINTMENT (OUTPATIENT)
Age: 75
End: 2025-04-28

## 2025-05-08 ENCOUNTER — APPOINTMENT (OUTPATIENT)
Dept: ELECTROPHYSIOLOGY | Facility: CLINIC | Age: 75
End: 2025-05-08

## 2025-05-08 ENCOUNTER — NON-APPOINTMENT (OUTPATIENT)
Age: 75
End: 2025-05-08

## 2025-05-15 ENCOUNTER — NON-APPOINTMENT (OUTPATIENT)
Age: 75
End: 2025-05-15

## 2025-05-15 ENCOUNTER — APPOINTMENT (OUTPATIENT)
Dept: ELECTROPHYSIOLOGY | Facility: CLINIC | Age: 75
End: 2025-05-15
Payer: MEDICARE

## 2025-05-15 VITALS — HEART RATE: 66 BPM | SYSTOLIC BLOOD PRESSURE: 95 MMHG | DIASTOLIC BLOOD PRESSURE: 63 MMHG

## 2025-05-15 PROCEDURE — 93282 PRGRMG EVAL IMPLANTABLE DFB: CPT

## 2025-05-23 ENCOUNTER — RX RENEWAL (OUTPATIENT)
Age: 75
End: 2025-05-23

## 2025-08-07 ENCOUNTER — APPOINTMENT (OUTPATIENT)
Dept: ELECTROPHYSIOLOGY | Facility: CLINIC | Age: 75
End: 2025-08-07
Payer: MEDICARE

## 2025-08-07 ENCOUNTER — NON-APPOINTMENT (OUTPATIENT)
Age: 75
End: 2025-08-07

## 2025-08-07 PROCEDURE — 93295 DEV INTERROG REMOTE 1/2/MLT: CPT

## 2025-08-07 PROCEDURE — 93296 REM INTERROG EVL PM/IDS: CPT

## 2025-08-14 ENCOUNTER — APPOINTMENT (OUTPATIENT)
Dept: HEART FAILURE | Facility: CLINIC | Age: 75
End: 2025-08-14
Payer: MEDICARE

## 2025-08-14 VITALS
WEIGHT: 172 LBS | BODY MASS INDEX: 29.37 KG/M2 | DIASTOLIC BLOOD PRESSURE: 69 MMHG | HEART RATE: 64 BPM | SYSTOLIC BLOOD PRESSURE: 106 MMHG | OXYGEN SATURATION: 96 % | HEIGHT: 64 IN

## 2025-08-14 DIAGNOSIS — I50.9 HEART FAILURE, UNSPECIFIED: ICD-10-CM

## 2025-08-14 DIAGNOSIS — F03.90 UNSPECIFIED DEMENTIA W/OUT BEHAVIORAL DISTURBANCE: ICD-10-CM

## 2025-08-14 PROCEDURE — 93000 ELECTROCARDIOGRAM COMPLETE: CPT

## 2025-08-14 PROCEDURE — 99214 OFFICE O/P EST MOD 30 MIN: CPT | Mod: 25

## 2025-08-20 ENCOUNTER — APPOINTMENT (OUTPATIENT)
Dept: NEPHROLOGY | Facility: CLINIC | Age: 75
End: 2025-08-20
Payer: MEDICARE

## 2025-08-20 VITALS
SYSTOLIC BLOOD PRESSURE: 93 MMHG | HEIGHT: 64 IN | TEMPERATURE: 96.1 F | OXYGEN SATURATION: 95 % | HEART RATE: 55 BPM | DIASTOLIC BLOOD PRESSURE: 67 MMHG | BODY MASS INDEX: 29.71 KG/M2 | WEIGHT: 174 LBS

## 2025-08-20 DIAGNOSIS — N18.30 CHRONIC KIDNEY DISEASE, STAGE 3 UNSPECIFIED: ICD-10-CM

## 2025-08-20 DIAGNOSIS — I10 ESSENTIAL (PRIMARY) HYPERTENSION: ICD-10-CM

## 2025-08-20 PROCEDURE — 99213 OFFICE O/P EST LOW 20 MIN: CPT

## 2025-08-21 ENCOUNTER — NON-APPOINTMENT (OUTPATIENT)
Age: 75
End: 2025-08-21

## 2025-08-21 LAB
25(OH)D3 SERPL-MCNC: 42 NG/ML
ALBUMIN SERPL ELPH-MCNC: 4.3 G/DL
ANION GAP SERPL CALC-SCNC: 14 MMOL/L
APPEARANCE: CLEAR
BACTERIA: NEGATIVE /HPF
BASOPHILS # BLD AUTO: 0.07 K/UL
BASOPHILS NFR BLD AUTO: 1.1 %
BILIRUBIN URINE: NEGATIVE
BLOOD URINE: NEGATIVE
BUN SERPL-MCNC: 15 MG/DL
CALCIUM SERPL-MCNC: 9.7 MG/DL
CAST: 0 /LPF
CHLORIDE SERPL-SCNC: 102 MMOL/L
CO2 SERPL-SCNC: 20 MMOL/L
COLOR: YELLOW
CREAT SERPL-MCNC: 2.2 MG/DL
CREAT SPEC-SCNC: 91 MG/DL
CREAT/PROT UR: 0.1 RATIO
EGFRCR SERPLBLD CKD-EPI 2021: 30 ML/MIN/1.73M2
EOSINOPHIL # BLD AUTO: 0.53 K/UL
EOSINOPHIL NFR BLD AUTO: 8.5 %
EPITHELIAL CELLS: 0 /HPF
GLUCOSE QUALITATIVE U: >=1000 MG/DL
GLUCOSE SERPL-MCNC: 115 MG/DL
HCT VFR BLD CALC: 46.4 %
HGB BLD-MCNC: 15.2 G/DL
IMM GRANULOCYTES NFR BLD AUTO: 0.3 %
KETONES URINE: NEGATIVE MG/DL
LEUKOCYTE ESTERASE URINE: NEGATIVE
LYMPHOCYTES # BLD AUTO: 2.53 K/UL
LYMPHOCYTES NFR BLD AUTO: 40.5 %
MAN DIFF?: NORMAL
MCHC RBC-ENTMCNC: 30.3 PG
MCHC RBC-ENTMCNC: 32.8 G/DL
MCV RBC AUTO: 92.6 FL
MICROSCOPIC-UA: NORMAL
MONOCYTES # BLD AUTO: 0.71 K/UL
MONOCYTES NFR BLD AUTO: 11.4 %
NEUTROPHILS # BLD AUTO: 2.39 K/UL
NEUTROPHILS NFR BLD AUTO: 38.2 %
NITRITE URINE: NEGATIVE
PH URINE: 5.5
PHOSPHATE SERPL-MCNC: 4 MG/DL
PLATELET # BLD AUTO: 191 K/UL
POTASSIUM SERPL-SCNC: 4.4 MMOL/L
PROT UR-MCNC: 5 MG/DL
PROTEIN URINE: NEGATIVE MG/DL
RBC # BLD: 5.01 M/UL
RBC # FLD: 13.6 %
RED BLOOD CELLS URINE: 0 /HPF
SODIUM SERPL-SCNC: 136 MMOL/L
SPECIFIC GRAVITY URINE: 1.02
UROBILINOGEN URINE: 0.2 MG/DL
WBC # FLD AUTO: 6.25 K/UL
WHITE BLOOD CELLS URINE: 0 /HPF

## 2025-09-12 ENCOUNTER — APPOINTMENT (OUTPATIENT)
Dept: ULTRASOUND IMAGING | Facility: IMAGING CENTER | Age: 75
End: 2025-09-12